# Patient Record
Sex: FEMALE | Race: WHITE | NOT HISPANIC OR LATINO | Employment: OTHER | URBAN - METROPOLITAN AREA
[De-identification: names, ages, dates, MRNs, and addresses within clinical notes are randomized per-mention and may not be internally consistent; named-entity substitution may affect disease eponyms.]

---

## 2017-06-08 ENCOUNTER — HOSPITAL ENCOUNTER (OUTPATIENT)
Dept: MAMMOGRAPHY | Facility: CLINIC | Age: 62
Discharge: HOME | End: 2017-06-08
Attending: OBSTETRICS & GYNECOLOGY | Admitting: OBSTETRICS & GYNECOLOGY
Payer: COMMERCIAL

## 2017-06-08 DIAGNOSIS — Z12.31 VISIT FOR SCREENING MAMMOGRAM: ICD-10-CM

## 2017-06-08 PROCEDURE — G0202 SCR MAMMO BI INCL CAD: HCPCS

## 2017-08-03 ENCOUNTER — OFFICE VISIT (OUTPATIENT)
Dept: FAMILY MEDICINE | Facility: CLINIC | Age: 62
End: 2017-08-03

## 2017-08-03 VITALS
SYSTOLIC BLOOD PRESSURE: 92 MMHG | OXYGEN SATURATION: 98 % | DIASTOLIC BLOOD PRESSURE: 68 MMHG | BODY MASS INDEX: 27.06 KG/M2 | HEART RATE: 90 BPM | WEIGHT: 144.4 LBS | TEMPERATURE: 98 F

## 2017-08-03 DIAGNOSIS — H61.23 BILATERAL IMPACTED CERUMEN: Primary | ICD-10-CM

## 2017-08-03 DIAGNOSIS — Z23 NEED FOR VACCINATION: ICD-10-CM

## 2017-08-03 PROCEDURE — 99213 OFFICE O/P EST LOW 20 MIN: CPT | Mod: 25 | Performed by: PHYSICIAN ASSISTANT

## 2017-08-03 PROCEDURE — 90471 IMMUNIZATION ADMIN: CPT | Performed by: PHYSICIAN ASSISTANT

## 2017-08-03 PROCEDURE — 90736 HZV VACCINE LIVE SUBQ: CPT | Performed by: PHYSICIAN ASSISTANT

## 2017-08-03 NOTE — PROGRESS NOTES
CC: Shingles shot, ear wash    History:  Mylene is here today to check her ears. She has a history of having wax build up in her ears, and was told she should have regular washes. She is not having any pain or ear symptoms, . Does feel like she has bad hearing. Has not tried any OTC treatments.    Mylene is also wanting her shingles shot today.    PMH, MEDICATIONS, ALLERGIES, SOCIAL AND FAMILY HISTORY in [facility] and reviewed by me personally.      ROS negative other than the symptoms noted above in the HPI.        Examination   BP 92/68 (BP Location: Right arm, Patient Position: Chair, Cuff Size: Adult Regular)  Pulse 90  Temp 98  F (36.7  C) (Oral)  Wt 65.5 kg (144 lb 6.4 oz)  LMP 10/08/2006  SpO2 98%  Breastfeeding? No  BMI 27.06 kg/m2       Constitutional: Sitting comfortably, in no acute distress. Vital signs noted  Eyes: pupils equal round reactive to light and accomodation, extra ocular movements intact  Ears: Ear canals bilaterally impacted with cerumen.  Mouth and throat: without erythema or lesions of the mucosa  Neck:  no adenopathy, trachea midline and normal to palpation  Cardiovascular:  regular rate and rhythm, no murmurs, clicks, or gallops  Respiratory:  normal respiratory rate and rhythm, lungs clear to auscultation  SKIN: No jaundice/pallor/rash.   Psychiatric: mentation appears normal and affect normal/bright        A/P    ICD-10-CM    1. Bilateral impacted cerumen H61.23 Removal Impacted Cerumen Irrigation Unilateral (Ear Wash)   2. Need for vaccination Z23 VACCINE ADMINISTRATION, INITIAL     ZOSTER VACC LIVE SUBQ NJX       DISCUSSION: Ear wash performed without difficulty. Gave Mylene handout on earwax management. Recommended Debrox drops every 1-2 weeks.    Shingles vaccination given.     follow up visit: As needed    Arely Ortiz PA-C  [facility]

## 2017-08-03 NOTE — MR AVS SNAPSHOT
"              After Visit Summary   8/3/2017    Mylene Vasquez    MRN: [id number]           Patient Information     Date Of Birth          1955        Visit Information        Provider Department      8/3/2017 10:30 AM Arely Ortiz PA-C [facility]        Today's Diagnoses     Bilateral impacted cerumen    -  1    Need for vaccination           Follow-ups after your visit        Follow-up notes from your care team     Return if symptoms worsen or fail to improve.      Who to contact     If you have questions or need follow up information about today's clinic visit or your schedule please contact [facility] directly at [phone].  Normal or non-critical lab and imaging results will be communicated to you by MyChart, letter or phone within 4 business days after the clinic has received the results. If you do not hear from us within 7 days, please contact the clinic through [employer] or phone. If you have a critical or abnormal lab result, we will notify you by phone as soon as possible.  Submit refill requests through [employer] or call your pharmacy and they will forward the refill request to us. Please allow 3 business days for your refill to be completed.          Additional Information About Your Visit        MyChart Information     [employer] lets you send messages to your doctor, view your test results, renew your prescriptions, schedule appointments and more. To sign up, go to [email or web address] . Click on \"Log in\" on the left side of the screen, which will take you to the Welcome page. Then click on \"Sign up Now\" on the right side of the page.     You will be asked to enter the access code listed below, as well as some personal information. Please follow the directions to create your username and password.     Your access code is: [email or web address]  Expires: 2017 11:03 PM     Your access code will  in 90 days. If you need help or a new code, " please call your [place] clinic or [phone].        Care EveryWhere ID     This is your Care EveryWhere ID. This could be used by other organizations to access your [place] medical records  [id number]        Your Vitals Were     Pulse Temperature Last Period Pulse Oximetry Breastfeeding? BMI (Body Mass Index)    90 98  F (36.7  C) (Oral) 10/08/2006 98% No 27.06 kg/m2       Blood Pressure from Last 3 Encounters:   08/03/17 92/68   04/12/16 120/80   11/04/15 118/70    Weight from Last 3 Encounters:   08/03/17 65.5 kg (144 lb 6.4 oz)   04/12/16 65.8 kg (145 lb)   11/04/15 66.4 kg (146 lb 6.4 oz)              We Performed the Following     Removal Impacted Cerumen Irrigation Unilateral (Ear Wash)     VACCINE ADMINISTRATION, INITIAL     ZOSTER VACC LIVE SUBQ NJX        Primary Care Provider Office Phone # Fax #    Venus Huerta [phone]       [facility] [address]   [facility] [address]        Equal Access to Services     [facility] CenterGABBY : Hadii aad ku hadasho Soomaali, waaxda luqadaha, qaybta kaalmada adeyenniyajitendra, mike sanders . So [facility] [phone].    ATENCIÓN: Si habla español, tiene a siddiqui disposición servicios gratuitos de asistencia lingüística. [facility] [phone].    We comply with applicable federal civil rights laws and [place] laws. We do not discriminate on the basis of race, color, national origin, age, disability sex, sexual orientation or gender identity.            Thank you!     Thank you for choosing [facility]  for your care. Our goal is always to provide you with excellent care. Hearing back from our patients is one way we can continue to improve our services. Please take a few minutes to complete the written survey that you may receive in the mail after your visit with us. Thank you!             Your Updated Medication List - Protect others around you: Learn how to safely use, store and  throw away your medicines at www.disposemymeds.org.          This list is accurate as of: 8/3/17 11:03 PM.  Always use your most recent med list.                   Brand Name Dispense Instructions for use Diagnosis    clomiPRAMINE 50 MG capsule    ANAFRANIL     Take 50 mg by mouth At Bedtime 5 caps daily        PARoxetine 30 MG tablet    PAXIL     Take by mouth every morning

## 2017-08-03 NOTE — NURSING NOTE
Mylene is here for ear check and shingles vaccine    Pre-Visit Screening :  Immunizations : Declines Tdap but wants shingle stoday  Colonoscopy : is up to date  Mammogram : is up to date  Asthma Action Test/Plan : SINDY  PHQ9/GAD7 :  SINDY  Pulse - regular  My Chart - declines    CLASSIFICATION OF OVERWEIGHT AND OBESITY BY BMI                         Obesity Class           BMI(kg/m2)  Underweight                                    < 18.5  Normal                                         18.5-24.9  Overweight                                     25.0-29.9  OBESITY                     I                  30.0-34.9                              II                 35.0-39.9  EXTREME OBESITY             III                >40                             Patient's  BMI Body mass index is 27.06 kg/(m^2).  http://hin.nhlbi.nih.gov/menuplanner/menu.cgi  Questioned patient about current smoking habits.  Pt. has never smoked.

## 2018-06-11 ENCOUNTER — HOSPITAL ENCOUNTER (OUTPATIENT)
Dept: MAMMOGRAPHY | Facility: CLINIC | Age: 63
Discharge: HOME | End: 2018-06-11
Attending: OBSTETRICS & GYNECOLOGY | Admitting: OBSTETRICS & GYNECOLOGY
Payer: COMMERCIAL

## 2018-06-11 DIAGNOSIS — Z12.31 VISIT FOR SCREENING MAMMOGRAM: ICD-10-CM

## 2018-06-11 PROCEDURE — 77063 BREAST TOMOSYNTHESIS BI: CPT

## 2018-11-26 ENCOUNTER — HOSPITAL ENCOUNTER (OUTPATIENT)
Facility: CLINIC | Age: 63
Discharge: HOME | End: 2018-11-26
Attending: COLON & RECTAL SURGERY | Admitting: COLON & RECTAL SURGERY
Payer: COMMERCIAL

## 2018-11-26 VITALS
OXYGEN SATURATION: 97 % | WEIGHT: 140 LBS | DIASTOLIC BLOOD PRESSURE: 84 MMHG | SYSTOLIC BLOOD PRESSURE: 131 MMHG | RESPIRATION RATE: 12 BRPM | BODY MASS INDEX: 25.76 KG/M2 | HEIGHT: 62 IN

## 2018-11-26 LAB — COLONOSCOPY: NORMAL

## 2018-11-26 PROCEDURE — 25000128 H RX IP 250 OP 636: Performed by: COLON & RECTAL SURGERY

## 2018-11-26 PROCEDURE — G0121 COLON CA SCRN NOT HI RSK IND: HCPCS | Performed by: COLON & RECTAL SURGERY

## 2018-11-26 PROCEDURE — 45378 DIAGNOSTIC COLONOSCOPY: CPT | Performed by: COLON & RECTAL SURGERY

## 2018-11-26 PROCEDURE — G0500 MOD SEDAT ENDO SERVICE >5YRS: HCPCS | Performed by: COLON & RECTAL SURGERY

## 2018-11-26 PROCEDURE — 99153 MOD SED SAME PHYS/QHP EA: CPT | Performed by: COLON & RECTAL SURGERY

## 2018-11-26 RX ORDER — NALOXONE HYDROCHLORIDE 0.4 MG/ML
.1-.4 INJECTION, SOLUTION INTRAMUSCULAR; INTRAVENOUS; SUBCUTANEOUS
Status: DISCONTINUED | OUTPATIENT
Start: 2018-11-26 | End: 2018-11-26 | Stop reason: HOSPADM

## 2018-11-26 RX ORDER — ONDANSETRON 2 MG/ML
4 INJECTION INTRAMUSCULAR; INTRAVENOUS EVERY 6 HOURS PRN
Status: DISCONTINUED | OUTPATIENT
Start: 2018-11-26 | End: 2018-11-26 | Stop reason: HOSPADM

## 2018-11-26 RX ORDER — ONDANSETRON 4 MG/1
4 TABLET, ORALLY DISINTEGRATING ORAL EVERY 6 HOURS PRN
Status: DISCONTINUED | OUTPATIENT
Start: 2018-11-26 | End: 2018-11-26 | Stop reason: HOSPADM

## 2018-11-26 RX ORDER — LIDOCAINE 40 MG/G
CREAM TOPICAL
Status: DISCONTINUED | OUTPATIENT
Start: 2018-11-26 | End: 2018-11-26 | Stop reason: HOSPADM

## 2018-11-26 RX ORDER — FENTANYL CITRATE 50 UG/ML
INJECTION, SOLUTION INTRAMUSCULAR; INTRAVENOUS PRN
Status: DISCONTINUED | OUTPATIENT
Start: 2018-11-26 | End: 2018-11-26 | Stop reason: HOSPADM

## 2018-11-26 RX ORDER — FLUMAZENIL 0.1 MG/ML
0.2 INJECTION, SOLUTION INTRAVENOUS
Status: DISCONTINUED | OUTPATIENT
Start: 2018-11-26 | End: 2018-11-26 | Stop reason: HOSPADM

## 2018-11-26 RX ORDER — ONDANSETRON 2 MG/ML
4 INJECTION INTRAMUSCULAR; INTRAVENOUS
Status: DISCONTINUED | OUTPATIENT
Start: 2018-11-26 | End: 2018-11-26 | Stop reason: HOSPADM

## 2018-11-26 NOTE — H&P
Pre-Endoscopy History and Physical     Mylene Vasquez MRN# [id number]   YOB: 1955 Age: 63 year old     Date of Procedure: 11/26/2018  Primary care provider: Venus Huerta  Type of Endoscopy: colonoscopy  Reason for Procedure: screening  Type of Anesthesia Anticipated: Moderate Sedation    HPI:    Mylene is a 63 year old female who will be undergoing the above procedure.      A history and physical has been performed. The patient's medications and allergies have been reviewed. The risks and benefits of the procedure and the sedation options and risks were discussed with the patient.  All questions were answered and informed consent was obtained.      She denies a personal or family history of anesthesia complications or bleeding disorders.     Allergies   Allergen Reactions     Penicillins      itchy        Prior to Admission Medications   Prescriptions Last Dose Informant Patient Reported? Taking?   PARoxetine (PAXIL) 30 MG tablet 11/25/2018  Yes Yes   Sig: Take by mouth every morning   clomiPRAMINE (ANAFRANIL) 50 MG capsule 11/25/2018  Yes Yes   Sig: Take 50 mg by mouth At Bedtime 5 caps daily      Facility-Administered Medications: None       Patient Active Problem List   Diagnosis     ACP (advance care planning)     Health Care Home     Generalized anxiety disorder     Callus of foot     DDD (degenerative disc disease), lumbar     Anxiety        Past Medical History:   Diagnosis Date     Anxiety      Depressive disorder         Past Surgical History:   Procedure Laterality Date     COLONOSCOPY       URETHROPLASTY  1968       Social History   Substance Use Topics     Smoking status: Never Smoker     Smokeless tobacco: Never Used     Alcohol use Yes      Comment: very little       Family History   Problem Relation Age of Onset     Alcohol/Drug Father      Cancer Father      lung cancer     Colon Cancer No family hx of        REVIEW OF SYSTEMS:     5 point ROS negative except as noted above in  "HPI, including Gen., Resp., CV, GI &  system review.      PHYSICAL EXAM:   Ht 1.575 m (5' 2\")  Wt 63.5 kg (140 lb)  LMP 03/26/2007  BMI 25.61 kg/m2 Estimated body mass index is 25.61 kg/(m^2) as calculated from the following:    Height as of this encounter: 1.575 m (5' 2\").    Weight as of this encounter: 63.5 kg (140 lb).   GENERAL APPEARANCE: healthy and alert  MENTAL STATUS: alert  AIRWAY EXAM: Mallampatti Class II (visualization of the soft palate, fauces, and uvula)  RESP: lungs clear to auscultation - no rales, rhonchi or wheezes  CV: regular rates and rhythm      DIAGNOSTICS:    Not indicated      IMPRESSION   ASA Class 2 - Mild systemic disease        PLAN:       Plan for colonoscopy. We discussed the risks, benefits and alternatives and the patient wished to proceed.    The above has been forwarded to the consulting provider.      Signed Electronically by: Ilsa Aguirre MD  November 26, 2018  "

## 2019-06-18 ENCOUNTER — HOSPITAL ENCOUNTER (OUTPATIENT)
Dept: MAMMOGRAPHY | Facility: CLINIC | Age: 64
Discharge: HOME | End: 2019-06-18
Attending: OBSTETRICS & GYNECOLOGY | Admitting: OBSTETRICS & GYNECOLOGY
Payer: COMMERCIAL

## 2019-06-18 DIAGNOSIS — Z12.31 VISIT FOR SCREENING MAMMOGRAM: ICD-10-CM

## 2019-06-18 PROCEDURE — 77063 BREAST TOMOSYNTHESIS BI: CPT

## 2019-10-23 ENCOUNTER — ANCILLARY PROCEDURE (OUTPATIENT)
Dept: GENERAL RADIOLOGY | Facility: CLINIC | Age: 64
End: 2019-10-23
Attending: FAMILY MEDICINE
Payer: COMMERCIAL

## 2019-10-23 ENCOUNTER — OFFICE VISIT (OUTPATIENT)
Dept: ORTHOPEDICS | Facility: CLINIC | Age: 64
End: 2019-10-23
Payer: COMMERCIAL

## 2019-10-23 VITALS
DIASTOLIC BLOOD PRESSURE: 82 MMHG | HEIGHT: 62 IN | WEIGHT: 147 LBS | BODY MASS INDEX: 27.05 KG/M2 | SYSTOLIC BLOOD PRESSURE: 118 MMHG

## 2019-10-23 DIAGNOSIS — M41.9 SCOLIOSIS OF LUMBAR SPINE, UNSPECIFIED SCOLIOSIS TYPE: ICD-10-CM

## 2019-10-23 DIAGNOSIS — M51.369 LUMBAR DEGENERATIVE DISC DISEASE: ICD-10-CM

## 2019-10-23 DIAGNOSIS — M47.816 FACET ARTHROPATHY, LUMBAR: ICD-10-CM

## 2019-10-23 DIAGNOSIS — M54.50 LOW BACK PAIN: ICD-10-CM

## 2019-10-23 DIAGNOSIS — M54.41 CHRONIC RIGHT-SIDED LOW BACK PAIN WITH RIGHT-SIDED SCIATICA: Primary | ICD-10-CM

## 2019-10-23 DIAGNOSIS — G89.29 CHRONIC RIGHT-SIDED LOW BACK PAIN WITH RIGHT-SIDED SCIATICA: Primary | ICD-10-CM

## 2019-10-23 DIAGNOSIS — M43.16 SPONDYLOLISTHESIS OF LUMBAR REGION: ICD-10-CM

## 2019-10-23 PROCEDURE — 72100 X-RAY EXAM L-S SPINE 2/3 VWS: CPT

## 2019-10-23 PROCEDURE — 99204 OFFICE O/P NEW MOD 45 MIN: CPT | Performed by: FAMILY MEDICINE

## 2019-10-23 ASSESSMENT — MIFFLIN-ST. JEOR: SCORE: 1175.04

## 2019-10-23 NOTE — PATIENT INSTRUCTIONS
1. Chronic right-sided low back pain with right-sided sciatica    2. Scoliosis of lumbar spine, unspecified scoliosis type    3. Facet arthropathy, lumbar    4. Lumbar degenerative disc disease    5. Spondylolisthesis of lumbar region (lumbar)      Reviewed your xray - age related changes, nothing surgical. Evidence of facet arthritis, degenerative disc disease (narrowing between the bones  You will need home exercise daily for the rest of your life to maintain your stability / strength of your back  If needed, can restart formal therapy with a spine based therapist. Recommend someone who is MDT certified.  If not improving can consider MRI (to evaluate for an injection) and / or referral to pain management  Would recommend seeing Neurology regarding the tremor that was noted today    Follow-up as needed.

## 2019-10-23 NOTE — LETTER
10/23/2019         RE: Mylene Vasquez  [address] [place] [address]        Dear Colleague,    Thank you for referring your patient, Mylene Vasquez, to the [facility]. Please see a copy of my visit note below.    ASSESSMENT & PLAN    1. Chronic right-sided low back pain with right-sided sciatica    2. Scoliosis of lumbar spine, unspecified scoliosis type    3. Facet arthropathy, lumbar    4. Lumbar degenerative disc disease    5. Spondylolisthesis of lumbar region (lumbar)      Seen for chronic back pain, atraumatic in nature.  Has been seen by [facility] in the past - xray and physical which she reports did not help  Reviewed xray - age related degenerative disc, scoliosis and facet arthritis. No power deficit on exam. All non-surgical findings.  Educated that will need home exercise daily for the rest of her life to maintain your stability / strength of your back  If needed, can restart formal therapy with a spine based therapist. Recommend someone who is MDT certified. Not currently interested in a referral.  If not improving can consider MRI (to evaluate for an injection) and / or referral to pain management  Additionally, noted to have slight hyperactive patellar reflex with tremor / twitch after access reflex.  Would recommend seeing Neurology. Will notify PCP of my findings.    Follow-up as needed.    -----    SUBJECTIVE  Mylene Vasquez is a/an 64 year old female who is seen as a self referral for evaluation of low back pain. The patient is seen by themselves.    Onset: 6-7 years(s) ago. Reports insidious onset without acute precipitating event.  Location of Pain: midline low back  Rating of Pain at worst: 5/10  Rating of Pain Currently: 1/10  Worsened by: prolonged standing, slow walking, bending  Better with: sitting, laying down  Treatments tried: rest/activity avoidance, ice, heat, ibuprofen (does not take it really), home exercises, physical therapy and chiropractic care.   "Does not really take any medications, finds more relief with sitting.  Quality: stiff, uncomfortable  Red flags: Weakness: Yes - left leg, bowel/bladder loss: No, foot drop: No  Associated symptoms: numbness and tingling radiating into left buttock and down left leg into left foot x 4 months only with sitting, occurs intermittently  Orthopedic history: YES - h/o chronic low back pain Date: 2014. Was seen by [facility] (Dr. Shafer) and also went to [facility].  No relief with therapy.  Finds that sitting helps.  Aquafit in the pool - has been helpful, [place] and then needs a break)  Relevant surgical history: NO  Patient Social History: retired    Patient's past medical, surgical, social, and family histories were reviewed today and no pertinent history related to patient's presenting problem.    REVIEW OF SYSTEMS:  10 point ROS is negative other than symptoms noted above in HPI, Past Medical History or as stated below  Constitutional: NEGATIVE for fever, chills, change in weight  Skin: NEGATIVE for worrisome rashes, moles or lesions  GI/: NEGATIVE for bowel or bladder changes  Neuro: NEGATIVE for weakness, dizziness or paresthesias    OBJECTIVE:  /82   Ht 1.575 m (5' 2\")   Wt 66.7 kg (147 lb)   LMP 03/26/2007   BMI 26.89 kg/m      General: healthy, alert and in no distress  HEENT: no scleral icterus or conjunctival erythema  Skin: no suspicious lesions or rash. No jaundice.  CV: no pedal edema  Resp: normal respiratory effort without conversational dyspnea   Psych: normal mood and affect  Gait: normal steady gait with appropriate coordination and balance  Neuro: normal light touch sensory exam of the bilateral lower extremities.  DTR's appears hyperactive on right patella with twitch / tremor after completion. Present on the left (patella) and bilateral and achilles  MSK:  THORACIC/LUMBAR SPINE  Inspection:    No gross deformity/asymmetry  Palpation:    Tender about the lumbar " facet joints, left SI joint, right SI joint and left sciatic notch. Otherwise remainder of landmarks are nontender.  Range of Motion:     Lumbar flexion limited by pain    Lumbar extension limited by pain  Strength:    quadriceps 5/5, hamstrings 5/5, gastrocsoleus 5/5, tibialis anterior 5/5, extensor hallicus longus 5/5  Special Tests:    Negative: slump test (bilateral). Noted to have reflex twitching in bilateral lower extremities (right > left) when raising legs to check slump    Independent visualization of the below image:  X-ray Lumbar Spine  Scoliosis.  Advanced degenerative disc loss at L5-S1.  Facet arthropathy with degenerative mild spondylolisthesis at L4-L5.  No fracture or acute osseous findings.    Gunnar Mayen DO [facility]      Again, thank you for allowing me to participate in the care of your patient.        Sincerely,        Gunnar Mayen DO

## 2019-10-23 NOTE — PROGRESS NOTES
ASSESSMENT & PLAN    1. Chronic right-sided low back pain with right-sided sciatica    2. Scoliosis of lumbar spine, unspecified scoliosis type    3. Facet arthropathy, lumbar    4. Lumbar degenerative disc disease    5. Spondylolisthesis of lumbar region (lumbar)      Seen for chronic back pain, atraumatic in nature.  Has been seen by [facility] in the past - xray and physical which she reports did not help  Reviewed xray - age related degenerative disc, scoliosis and facet arthritis. No power deficit on exam. All non-surgical findings.  Educated that will need home exercise daily for the rest of her life to maintain your stability / strength of your back  If needed, can restart formal therapy with a spine based therapist. Recommend someone who is MDT certified. Not currently interested in a referral.  If not improving can consider MRI (to evaluate for an injection) and / or referral to pain management  Additionally, noted to have slight hyperactive patellar reflex with tremor / twitch after access reflex.  Would recommend seeing Neurology. Will notify PCP of my findings.    Follow-up as needed.    -----    SUBJECTIVE  Mylene Vasquez is a/an 64 year old female who is seen as a self referral for evaluation of low back pain. The patient is seen by themselves.    Onset: 6-7 years(s) ago. Reports insidious onset without acute precipitating event.  Location of Pain: midline low back  Rating of Pain at worst: 5/10  Rating of Pain Currently: 1/10  Worsened by: prolonged standing, slow walking, bending  Better with: sitting, laying down  Treatments tried: rest/activity avoidance, ice, heat, ibuprofen (does not take it really), home exercises, physical therapy and chiropractic care.  Does not really take any medications, finds more relief with sitting.  Quality: stiff, uncomfortable  Red flags: Weakness: Yes - left leg, bowel/bladder loss: No, foot drop: No  Associated symptoms: numbness and tingling radiating into left buttock  "and down left leg into left foot x 4 months only with sitting, occurs intermittently  Orthopedic history: YES - h/o chronic low back pain Date: 2014. Was seen by [facility] (Dr. Shafer) and also went to [facility].  No relief with therapy.  Finds that sitting helps.  Aquafit in the pool - has been helpful, [place] and then needs a break)  Relevant surgical history: NO  Patient Social History: retired    Patient's past medical, surgical, social, and family histories were reviewed today and no pertinent history related to patient's presenting problem.    REVIEW OF SYSTEMS:  10 point ROS is negative other than symptoms noted above in HPI, Past Medical History or as stated below  Constitutional: NEGATIVE for fever, chills, change in weight  Skin: NEGATIVE for worrisome rashes, moles or lesions  GI/: NEGATIVE for bowel or bladder changes  Neuro: NEGATIVE for weakness, dizziness or paresthesias    OBJECTIVE:  /82   Ht 1.575 m (5' 2\")   Wt 66.7 kg (147 lb)   LMP 03/26/2007   BMI 26.89 kg/m     General: healthy, alert and in no distress  HEENT: no scleral icterus or conjunctival erythema  Skin: no suspicious lesions or rash. No jaundice.  CV: no pedal edema  Resp: normal respiratory effort without conversational dyspnea   Psych: normal mood and affect  Gait: normal steady gait with appropriate coordination and balance  Neuro: normal light touch sensory exam of the bilateral lower extremities.  DTR's appears hyperactive on right patella with twitch / tremor after completion. Present on the left (patella) and bilateral and achilles  MSK:  THORACIC/LUMBAR SPINE  Inspection:    No gross deformity/asymmetry  Palpation:    Tender about the lumbar facet joints, left SI joint, right SI joint and left sciatic notch. Otherwise remainder of landmarks are nontender.  Range of Motion:     Lumbar flexion limited by pain    Lumbar extension limited by pain  Strength:    quadriceps 5/5, hamstrings 5/5, " gastrocsoleus 5/5, tibialis anterior 5/5, extensor hallicus longus 5/5  Special Tests:    Negative: slump test (bilateral). Noted to have reflex twitching in bilateral lower extremities (right > left) when raising legs to check slump    Independent visualization of the below image:  X-ray Lumbar Spine  Scoliosis.  Advanced degenerative disc loss at L5-S1.  Facet arthropathy with degenerative mild spondylolisthesis at L4-L5.  No fracture or acute osseous findings.    Gunnar Mayen DO [facility]

## 2019-10-23 NOTE — Clinical Note
Mylene Reed Dr. self-referred to my clinic today for her chronic back pain. Hopefully I was able to help her understand reasons for her pain and my recommendations. Of note, she seemed to have a hyperactive lower extremity reflex with ? tremor/twitch but not to the degree that I would say she has hyperreflexia. Asked that she follow-up with you and may warrant Neurology referral.

## 2019-11-20 ENCOUNTER — OFFICE VISIT (OUTPATIENT)
Dept: FAMILY MEDICINE | Facility: CLINIC | Age: 64
End: 2019-11-20

## 2019-11-20 VITALS
HEART RATE: 87 BPM | OXYGEN SATURATION: 99 % | SYSTOLIC BLOOD PRESSURE: 110 MMHG | TEMPERATURE: 97.3 F | DIASTOLIC BLOOD PRESSURE: 78 MMHG | WEIGHT: 155 LBS | BODY MASS INDEX: 28.35 KG/M2

## 2019-11-20 DIAGNOSIS — F41.1 GAD (GENERALIZED ANXIETY DISORDER): Primary | ICD-10-CM

## 2019-11-20 DIAGNOSIS — Z79.899 MEDICATION MANAGEMENT: ICD-10-CM

## 2019-11-20 PROCEDURE — 93000 ELECTROCARDIOGRAM COMPLETE: CPT | Performed by: FAMILY MEDICINE

## 2019-11-20 PROCEDURE — 99212 OFFICE O/P EST SF 10 MIN: CPT | Performed by: FAMILY MEDICINE

## 2019-11-20 RX ORDER — CLOMIPRAMINE HYDROCHLORIDE 50 MG/1
CAPSULE ORAL EVERY EVENING
COMMUNITY
Start: 2019-11-20

## 2019-11-20 NOTE — NURSING NOTE
Chief Complaint   Patient presents with     Consult     Order for EKG from Psychiatrist      Pre-visit Screening:  Immunizations:  up to date  Colonoscopy:  is up to date  Mammogram: is up to date  Asthma Action Test/Plan:  NA  PHQ9:  NA  GAD7:  NA  Questioned patient about current smoking habits Pt. has never smoked.  Ok to leave detailed message on voice mail for today's visit only yes, phone # [phone]

## 2019-11-20 NOTE — PROGRESS NOTES
SUBJECTIVE:  64 year old female presents with the following concern:    Sees Dr Donovan once a year for chronic anxiety  Dr Donovan is requesting annual EKG for  Medication management    Patient Active Problem List   Diagnosis     ACP (advance care planning)     Health Care Home     Generalized anxiety disorder     Callus of foot     DDD (degenerative disc disease), lumbar     Anxiety     Current Outpatient Medications   Medication     clomiPRAMINE (ANAFRANIL) 50 MG capsule     PARoxetine (PAXIL) 30 MG tablet     No current facility-administered medications for this visit.      Mylene denies dizziness, palpitations, chest pain.  Anxiety symptoms are controlled  Normal EKG Results to be sent to:    Dr Deshawn Donovan  [address] [place] [address], [address]  [place], [place]   [phone]  [phone]

## 2019-12-20 ENCOUNTER — OFFICE VISIT (OUTPATIENT)
Dept: FAMILY MEDICINE | Facility: CLINIC | Age: 64
End: 2019-12-20

## 2019-12-20 VITALS
HEART RATE: 90 BPM | DIASTOLIC BLOOD PRESSURE: 68 MMHG | SYSTOLIC BLOOD PRESSURE: 110 MMHG | TEMPERATURE: 98.7 F | OXYGEN SATURATION: 98 %

## 2019-12-20 DIAGNOSIS — H61.23 BILATERAL IMPACTED CERUMEN: ICD-10-CM

## 2019-12-20 DIAGNOSIS — R05.3 CHRONIC COUGH: Primary | ICD-10-CM

## 2019-12-20 DIAGNOSIS — K21.9 GASTROESOPHAGEAL REFLUX DISEASE, ESOPHAGITIS PRESENCE NOT SPECIFIED: ICD-10-CM

## 2019-12-20 PROCEDURE — 99214 OFFICE O/P EST MOD 30 MIN: CPT | Performed by: FAMILY MEDICINE

## 2019-12-20 PROCEDURE — 69209 REMOVE IMPACTED EAR WAX UNI: CPT | Mod: 50 | Performed by: FAMILY MEDICINE

## 2019-12-20 RX ORDER — DOXYCYCLINE 100 MG/1
100 CAPSULE ORAL
COMMUNITY
Start: 2019-12-11 | End: 2019-12-21

## 2019-12-20 NOTE — NURSING NOTE
Chief Complaint   Patient presents with     Hospital F/U     Urgent Care- [facility] 12/4, 12/11, Bad cold/flu, cough

## 2019-12-20 NOTE — PROGRESS NOTES
SUBJECTIVE:  64 year old female presents for follow up after UC visit on 12/11/2019 for symptoms of cough  At that visit she complained of four weeks of cough following a head cold  No fever  No nasal drainage  Normal chest x-ray 12/11/2019    Treatment:  Prednisone  Tessalon  Doxycycline    History of asthma:no  History of allergies: no  History of recurrent sinusitis, sinus surgery:    Throat feels dry    Always has a dry mouth- on antidepressants    Patient Active Problem List   Diagnosis     ACP (advance care planning)     Health Care Home     Generalized anxiety disorder     Callus of foot     DDD (degenerative disc disease), lumbar     Anxiety     Past Surgical History:   Procedure Laterality Date     COLONOSCOPY       COLONOSCOPY N/A 11/26/2018    Procedure: COLONOSCOPY ;  Surgeon: Ilsa Aguirre MD;  Location:  GI     URETHROPLASTY  1967     Current Outpatient Medications   Medication     clomiPRAMINE (ANAFRANIL) 50 MG capsule     omeprazole (PRILOSEC) 20 MG DR capsule     PARoxetine (PAXIL) 30 MG tablet     gabapentin (NEURONTIN) 300 MG capsule     No current facility-administered medications for this visit.       ROS: 10 point ROS neg other than the symptoms noted above in the HPI.  She denies heartburn  Ear reels plugged    OBJECTIVE:  /68 (BP Location: Left arm, Patient Position: Sitting, Cuff Size: Adult Regular)   Pulse 90   Temp 98.7  F (37.1  C) (Oral)   LMP 03/26/2007   SpO2 98%   Unilateral cerumen impaction-.resolution with ear irrigation. TM's normal bilaterally. Nose normal without lesions or discharge. Oropharynx normal. Neck supple without palpable adenopathy.  Chest is clear    Assessment    (R05) Chronic cough  (primary encounter diagnosis)  Comment 30 day trial and monitor symptoms  Plan: omeprazole (PRILOSEC) 20 MG DR capsule            (H61.23) Bilateral impacted cerumen  Comment:    Plan: Removal Impacted Cerumen Irrigation Unilateral         (Ear Wash)         (K21.9)  Gastroesophageal reflux disease, esophagitis presence not specified  Comment:  30 day trial (irritated throat, chronic cough)  Plan: omeprazole (PRILOSEC) 20 MG DR capsule

## 2019-12-23 ENCOUNTER — TELEPHONE (OUTPATIENT)
Dept: ORTHOPEDICS | Facility: CLINIC | Age: 64
End: 2019-12-23

## 2019-12-23 DIAGNOSIS — M47.816 FACET ARTHROPATHY, LUMBAR: ICD-10-CM

## 2019-12-23 DIAGNOSIS — M43.16 SPONDYLOLISTHESIS, LUMBAR REGION: ICD-10-CM

## 2019-12-23 DIAGNOSIS — M41.9 SCOLIOSIS OF LUMBAR SPINE, UNSPECIFIED SCOLIOSIS TYPE: ICD-10-CM

## 2019-12-23 DIAGNOSIS — M51.369 LUMBAR DEGENERATIVE DISC DISEASE: ICD-10-CM

## 2019-12-23 DIAGNOSIS — M54.41 CHRONIC RIGHT-SIDED LOW BACK PAIN WITH RIGHT-SIDED SCIATICA: Primary | ICD-10-CM

## 2019-12-23 DIAGNOSIS — G89.29 CHRONIC RIGHT-SIDED LOW BACK PAIN WITH RIGHT-SIDED SCIATICA: Primary | ICD-10-CM

## 2019-12-23 RX ORDER — GABAPENTIN 300 MG/1
CAPSULE ORAL
Qty: 90 CAPSULE | Refills: 3 | Status: SHIPPED | OUTPATIENT
Start: 2019-12-23 | End: 2020-12-08

## 2019-12-23 NOTE — TELEPHONE ENCOUNTER
Return call to patient to inform her of Dr. Mayen's recommendations.      Informed patient that Dr. Mayen signed PT orders and they were faxed to [facility].      Also informed patient that Dr. Mayen agreed with the recommendation of using NSAIDs and scheduled Tylenol.  Informed patient that she should be using the lowest effective dose of the NSAID and can take ibuprofen or Aleve but she did not take both.  Informed her that she may take 1000 mg of Tylenol 3 times a day and should not exceed 3000 mg of Tylenol a day.  Informed her to take medication with food.  Also informed patient that Dr. Mayen did send a prescription for gabapentin with taper to [facility].  Patient states that she will probably try NSAIDs and Tylenol first to see if she gets any relief.  Informed patient to call us with any questions or concerns.  Patient verbalized understanding and was appreciative of call back.    Mary Alice Virk, ATC

## 2019-12-23 NOTE — TELEPHONE ENCOUNTER
1. External PT order signed.  2. Agree with NSAID's (lowest effective dose) and scheduled Tylenol.  3. If desired can refer to pain management for additional work-up (MRI as appropriate) and next steps.   4. Rx for Gabapentin with taper sent to [facility] in chart.    Routing to office to help coordinate.    Gunnar Mayen DO, AXELM  [facility]

## 2019-12-23 NOTE — TELEPHONE ENCOUNTER
"Mylene Vasquez is a 64 year old female who left voicemail stating she saw Dr. Mayen back in October for back issue. It was recommended she go to physical therapy, but she never completed it. She states she is in \"so much pain\" now that she would like an order for physical therapy. She would like to get in after [place]. She was told she needs an order by a physical therapist. She would like it faxed but does not state where it is located. She would like a call back to discuss further.     Phone call to patient, no new injury. Has done physical therapy in the past but was not that confident in them. She would like to try it now. She would like an order sent to [facility] fax: [phone].     She states pain is located in same area of whole low back that radiates down right buttock to foot. Has numbness and tingling in buttock to foot at times. Denies new loss of bowel/bladder control or foot drop. She has an appointment scheduled for 1/3/20. Recommended she check with insurance regarding coverage. She also asked about what to take for pain. Discussed she may take ibuprofen or Aleve but not both. Discussed maximum daily dosages. Also discussed using heating pad or icy hot or patches she could try.   Will discuss with provider and get back with her per her request when order has been sent.     Patient expecting call back: YES      Preferred contact number:  [phone] (home) or on 's cell: [phone]  Can we leave a detailed message on this number: YES     Please see order pending and advise due to time frame passed and pain worsening.     SAVANAH Edwards RN            "

## 2020-01-06 ENCOUNTER — TRANSFERRED RECORDS (OUTPATIENT)
Dept: HEALTH INFORMATION MANAGEMENT | Facility: CLINIC | Age: 65
End: 2020-01-06

## 2020-02-04 RX ORDER — GABAPENTIN 300 MG/1
300 CAPSULE ORAL 3 TIMES DAILY
Qty: 90 CAPSULE | Refills: 3 | Status: SHIPPED | OUTPATIENT
Start: 2020-02-04 | End: 2020-12-08

## 2020-02-04 NOTE — TELEPHONE ENCOUNTER
Patient LVM that she called last week and stated she [facility]. Whomever she talked to said they would fax that over. Patient called to see if we had in fact faxed that over.       Arabella Hoyt ATC

## 2020-02-05 NOTE — TELEPHONE ENCOUNTER
Medication Request for: Gabapentin 300mg            Patient currently takin tab daily. Patient reports that she occasionally takes 1 tab BID when her back is more sore.  Patient has 90 capsules of medication remaining.  Patient is also taking OTC: none    Problems/ Concerns of Patient: constipation - patient unsure if related to gabapentin  Prescription last written on 19 by Dr. Mayen  Sig: Take one tab at night for 3 days.  If able to tolerate, take one tab twice daily for 3 days, then take one tab three times daily.   Last Fill Quantity: 90 with # refills: 3     Last Office Visit by provider: 10/23/19  Future Office Visit:   None scheduled  Patient desires to have faxed or E-prescribe to pharmacy if available  Pharmacy selected in [facility].    Phone number patient can be reached at: Home number on file [phone] (home)    Can we leave a detailed message on this number? YES    Huddled with provider regarding patient's concern for constipation.  Writer states that constipation is usually not a side effect of the gabapentin.  Will call patient once new Rx has been signed.    Mary Alice Virk, ATC

## 2020-02-12 NOTE — TELEPHONE ENCOUNTER
Called and checked that patient received medication. She did and was wondering if Dr. Mayen was a back specialist. I explained she wasn't but does see acute conditions. Anything longer that 4 months we would refer out. Noted understanding. She will be gone for about 3 weeks on vacation. Asked if she should come back for a follow up if she isn't doing better after that. I did recommend a f/u if not getting better.   No other questions were asked.    Arabella Hoyt ATC

## 2020-02-27 ENCOUNTER — TELEPHONE (OUTPATIENT)
Dept: ORTHOPEDICS | Facility: CLINIC | Age: 65
End: 2020-02-27

## 2020-02-27 NOTE — TELEPHONE ENCOUNTER
Received a message from central scheduling staff stating that Zoë from [facility] therapy department called with questions regarding plan of care forms that were faxed back to her on 2/24/2020.  Call back at [phone].    Return call to Zoë.  She states that patient has been seen for 6 visits of physical therapy.  She states she had faxed the plan of care forms for signature back in January.  Forms were faxed to her on 2/24/2020.  She states they were not signed and there was a note written on them regarding MDT PT so she wanted further clarification.     Upon chart review, no telephone encounter started regarding forms.  Requested Zoë fax back forms for further review and to better answer her questions.  She states she will also include blank copy of plan of care.  She asks if there are any questions to call her back directly at [phone].    Please watch for fax.    Mary Alice Virk, ATC

## 2020-03-06 NOTE — TELEPHONE ENCOUNTER
Late entry: Received fax on 2/28/20 with blank copy of plan of care.    Huddled with provider to discuss situation. Plan of care signed today (3/6/20) and faxed back to Zoë [facility] PT Dept. @ [phone].    Mary Alice Virk ATC

## 2020-03-31 ENCOUNTER — APPOINTMENT (OUTPATIENT)
Age: 65
Setting detail: DERMATOLOGY
End: 2020-04-03

## 2020-03-31 VITALS — WEIGHT: 140 LBS | RESPIRATION RATE: 14 BRPM | HEIGHT: 62 IN

## 2020-03-31 DIAGNOSIS — D22 MELANOCYTIC NEVI: ICD-10-CM

## 2020-03-31 DIAGNOSIS — Z71.89 OTHER SPECIFIED COUNSELING: ICD-10-CM

## 2020-03-31 DIAGNOSIS — D18.0 HEMANGIOMA: ICD-10-CM

## 2020-03-31 DIAGNOSIS — L81.4 OTHER MELANIN HYPERPIGMENTATION: ICD-10-CM

## 2020-03-31 DIAGNOSIS — L82.1 OTHER SEBORRHEIC KERATOSIS: ICD-10-CM

## 2020-03-31 PROBLEM — D22.5 MELANOCYTIC NEVI OF TRUNK: Status: ACTIVE | Noted: 2020-03-31

## 2020-03-31 PROBLEM — D18.01 HEMANGIOMA OF SKIN AND SUBCUTANEOUS TISSUE: Status: ACTIVE | Noted: 2020-03-31

## 2020-03-31 PROCEDURE — 99214 OFFICE O/P EST MOD 30 MIN: CPT

## 2020-03-31 PROCEDURE — OTHER COUNSELING: OTHER

## 2020-03-31 ASSESSMENT — LOCATION DETAILED DESCRIPTION DERM
LOCATION DETAILED: SUPERIOR THORACIC SPINE
LOCATION DETAILED: INFERIOR THORACIC SPINE
LOCATION DETAILED: RIGHT MEDIAL UPPER BACK
LOCATION DETAILED: RIGHT SUPERIOR MEDIAL UPPER BACK
LOCATION DETAILED: RIGHT INFERIOR MEDIAL UPPER BACK

## 2020-03-31 ASSESSMENT — LOCATION ZONE DERM: LOCATION ZONE: TRUNK

## 2020-03-31 ASSESSMENT — LOCATION SIMPLE DESCRIPTION DERM
LOCATION SIMPLE: RIGHT UPPER BACK
LOCATION SIMPLE: UPPER BACK

## 2020-08-05 ENCOUNTER — HOSPITAL ENCOUNTER (OUTPATIENT)
Dept: MAMMOGRAPHY | Facility: CLINIC | Age: 65
Discharge: HOME | End: 2020-08-05
Attending: OBSTETRICS & GYNECOLOGY | Admitting: OBSTETRICS & GYNECOLOGY
Payer: COMMERCIAL

## 2020-08-05 DIAGNOSIS — Z12.31 VISIT FOR SCREENING MAMMOGRAM: ICD-10-CM

## 2020-08-05 PROCEDURE — 77067 SCR MAMMO BI INCL CAD: CPT

## 2020-10-15 ENCOUNTER — MEDICAL CORRESPONDENCE (OUTPATIENT)
Dept: HEALTH INFORMATION MANAGEMENT | Facility: CLINIC | Age: 65
End: 2020-10-15

## 2020-11-25 ENCOUNTER — OFFICE VISIT (OUTPATIENT)
Dept: INTERNAL MEDICINE | Facility: CLINIC | Age: 65
End: 2020-11-25
Payer: COMMERCIAL

## 2020-11-25 VITALS
OXYGEN SATURATION: 98 % | TEMPERATURE: 97.7 F | HEART RATE: 102 BPM | SYSTOLIC BLOOD PRESSURE: 118 MMHG | HEIGHT: 62 IN | BODY MASS INDEX: 27.42 KG/M2 | DIASTOLIC BLOOD PRESSURE: 62 MMHG | WEIGHT: 149 LBS | RESPIRATION RATE: 14 BRPM

## 2020-11-25 DIAGNOSIS — Z91.89 AT RISK FOR SIDE EFFECT OF MEDICATION: ICD-10-CM

## 2020-11-25 DIAGNOSIS — F41.1 GENERALIZED ANXIETY DISORDER: Primary | ICD-10-CM

## 2020-11-25 PROCEDURE — 93000 ELECTROCARDIOGRAM COMPLETE: CPT | Performed by: INTERNAL MEDICINE

## 2020-11-25 ASSESSMENT — MIFFLIN-ST. JEOR: SCORE: 1171.17

## 2020-11-25 NOTE — PROGRESS NOTES
Patient needed EKG order to follow-up with psychiatrist.  Office visit was canceled.    Akin Vasquez MD

## 2020-12-01 ENCOUNTER — TELEPHONE (OUTPATIENT)
Dept: ORTHOPEDICS | Facility: CLINIC | Age: 65
End: 2020-12-01

## 2020-12-01 NOTE — TELEPHONE ENCOUNTER
Message from Samantha. Asks if she can get a refill on the Gabapentin she was prescribed last year, or if she needs an appointment for review.  Please call back.

## 2020-12-02 NOTE — TELEPHONE ENCOUNTER
Return call to patient. LVM requesting call back to discuss further. Did not leave detailed VM.    Patient has not been seen in office since October 2019. Gabapentin was last filled by Dr. Mayen on 2/4/20 - 90 tabs with 3 refills. Sig: Take 1 capsule (300 mg) by mouth 3 times daily.    Huddled with Dr. Beyer. It would be recommend patient schedule f/u appointment with Dr. Mayen or one of her colleagues for reevaluation prior to refilling medication.    Mary Alice Virk MBA, ATC

## 2020-12-03 NOTE — TELEPHONE ENCOUNTER
Message from Samantha, retuning the call. Says should be by her phone for the next 2 hours.  Please call back.

## 2020-12-03 NOTE — TELEPHONE ENCOUNTER
Message from Mylene. Says she will have her cell phone on her now so she can be ready for a call back.  Says also ok to leave voicemail.  Please call back.

## 2020-12-04 NOTE — TELEPHONE ENCOUNTER
Called and relayed message to patient, scheduled her to see Dr. Mayen on Tuesday for a follow up    Arabella Hoyt ATC

## 2020-12-08 ENCOUNTER — OFFICE VISIT (OUTPATIENT)
Dept: ORTHOPEDICS | Facility: CLINIC | Age: 65
End: 2020-12-08
Payer: COMMERCIAL

## 2020-12-08 VITALS
HEIGHT: 62 IN | BODY MASS INDEX: 27.42 KG/M2 | SYSTOLIC BLOOD PRESSURE: 100 MMHG | DIASTOLIC BLOOD PRESSURE: 78 MMHG | WEIGHT: 149 LBS

## 2020-12-08 DIAGNOSIS — M41.9 SCOLIOSIS OF LUMBAR SPINE, UNSPECIFIED SCOLIOSIS TYPE: ICD-10-CM

## 2020-12-08 DIAGNOSIS — M54.41 CHRONIC RIGHT-SIDED LOW BACK PAIN WITH RIGHT-SIDED SCIATICA: Primary | ICD-10-CM

## 2020-12-08 DIAGNOSIS — G89.29 CHRONIC RIGHT-SIDED LOW BACK PAIN WITH RIGHT-SIDED SCIATICA: Primary | ICD-10-CM

## 2020-12-08 DIAGNOSIS — G89.29 CHRONIC RIGHT SACROILIAC JOINT PAIN: ICD-10-CM

## 2020-12-08 DIAGNOSIS — M43.16 SPONDYLOLISTHESIS, LUMBAR REGION: ICD-10-CM

## 2020-12-08 DIAGNOSIS — M51.369 LUMBAR DEGENERATIVE DISC DISEASE: ICD-10-CM

## 2020-12-08 DIAGNOSIS — M43.16 SPONDYLOLISTHESIS OF LUMBAR REGION: ICD-10-CM

## 2020-12-08 DIAGNOSIS — M47.816 FACET ARTHROPATHY, LUMBAR: ICD-10-CM

## 2020-12-08 DIAGNOSIS — M53.3 CHRONIC RIGHT SACROILIAC JOINT PAIN: ICD-10-CM

## 2020-12-08 PROCEDURE — 99214 OFFICE O/P EST MOD 30 MIN: CPT | Performed by: FAMILY MEDICINE

## 2020-12-08 RX ORDER — GABAPENTIN 300 MG/1
CAPSULE ORAL
Qty: 90 CAPSULE | Refills: 4 | Status: SHIPPED | OUTPATIENT
Start: 2020-12-08 | End: 2022-09-27

## 2020-12-08 ASSESSMENT — MIFFLIN-ST. JEOR: SCORE: 1171.17

## 2020-12-08 NOTE — PROGRESS NOTES
"ASSESSMENT & PLAN    1. Chronic right-sided low back pain with right-sided sciatica    2. Scoliosis of lumbar spine, unspecified scoliosis type    3. Facet arthropathy, lumbar    4. Lumbar degenerative disc disease    5. Spondylolisthesis of lumbar region (lumbar)    6. Chronic right sacroiliac joint pain      Physical therapy: [facility] - [phone]. Recommend an MDT therapist which our system has and scheduling will get you plugged in with one  Rx for Gabapentin and would take daily / at night  If pain is not improving would recommend an MRI    Follow-up if not improving    -----    SUBJECTIVE:  Mylene Vasquez is a 65 year old female who is seen in follow-up for chronic low back pain. They were last seen 10/23/2019.     Since their last visit reports worsening pain. Patient notes pain is located in bilateral lower back. She does note that the pain that radiated down the right leg has resolved. She notes pain in lower back is a constant, dull ache. Pain increases with walking slow or standing in one place. Patient notes soreness in lower back when she wakes up in the morning. Pain improves with sitting. They indicate that their current pain level is 0/10 and at worst is 8/10. They have tried rest/activity avoidance, other medications: Gabapentin 300mg PRN, home exercises, physical therapy at [facility] and previous imaging (xray 10/23/19).      The patient is seen by themselves.    Patient's past medical, surgical, social, and family histories were reviewed today and no pertinent history related to patient's presenting problem.    REVIEW OF SYSTEMS:  Constitutional: NEGATIVE for fever, chills, change in weight  Skin: NEGATIVE for worrisome rashes, moles or lesions  GI/: NEGATIVE for bowel or bladder changes  Neuro: NEGATIVE for weakness, dizziness or paresthesias    OBJECTIVE:  /78   Ht 1.562 m (5' 1.5\")   Wt 67.6 kg (149 lb)   LMP 03/26/2007   BMI 27.70 kg/m     General: healthy, alert " and in no distress  HEENT: no scleral icterus or conjunctival erythema  Skin: no suspicious lesions or rash. No jaundice.  CV: regular rhythm by palpation, no pedal edema  Resp: normal respiratory effort without conversational dyspnea   Psych: normal mood and affect  Gait: normal steady gait with appropriate coordination and balance  Neuro: normal light touch sensory exam of the extremities.    MSK:  THORACIC/LUMBAR SPINE  Palpation:    Tender about the lumbar facet joints and right SI joint. Otherwise remainder of landmarks are nontender.  Range of Motion:     Lumbar flexion limited by pain    Lumbar extension limited by pain   Strength:    Full strength throughout hips/quads/hamstrings, no foot drop present  Special Tests:  Negative: slump test (bilateral)    Independent visualization of the below image:  LUMBAR SPINE TWO-THREE  VIEWS  10/23/2019 10:10 AM      HISTORY: Low back pain     COMPARISON: None.     FINDINGS: 5 lumbar-type vertebrae. There is curvature of the lumbar  spine convex towards the right. There is normal osseous alignment.  No  fractures are identified.  There is loss of disc space height at the  L5-S1 level. A vacuum disc phenomenon is present at this level.  Degenerative change in the facet joints of the lower lumbar spine.                                                                      IMPRESSION: Degenerative change at L5-S1.     MD Gunnar BARRON, DO [facility]

## 2020-12-08 NOTE — LETTER
12/8/2020         RE: Mylene Vasquez  [address] [place] [address]        Dear Colleague,    Thank you for referring your patient, Mylene Vasquez, to the [facility] [place]. Please see a copy of my visit note below.    ASSESSMENT & PLAN    1. Chronic right-sided low back pain with right-sided sciatica    2. Scoliosis of lumbar spine, unspecified scoliosis type    3. Facet arthropathy, lumbar    4. Lumbar degenerative disc disease    5. Spondylolisthesis of lumbar region (lumbar)    6. Chronic right sacroiliac joint pain      Physical therapy: [facility] - [phone]. Recommend an MDT therapist which our system has and scheduling will get you plugged in with one  Rx for Gabapentin and would take daily / at night  If pain is not improving would recommend an MRI    Follow-up if not improving    -----    SUBJECTIVE:  Mylene Vasquez is a 65 year old female who is seen in follow-up for chronic low back pain. They were last seen 10/23/2019.     Since their last visit reports worsening pain. Patient notes pain is located in bilateral lower back. She does note that the pain that radiated down the right leg has resolved. She notes pain in lower back is a constant, dull ache. Pain increases with walking slow or standing in one place. Patient notes soreness in lower back when she wakes up in the morning. Pain improves with sitting. They indicate that their current pain level is 0/10 and at worst is 8/10. They have tried rest/activity avoidance, other medications: Gabapentin 300mg PRN, home exercises, physical therapy at [facility] and previous imaging (xray 10/23/19).      The patient is seen by themselves.    Patient's past medical, surgical, social, and family histories were reviewed today and no pertinent history related to patient's presenting problem.    REVIEW OF SYSTEMS:  Constitutional: NEGATIVE for fever, chills, change in weight  Skin: NEGATIVE for  "worrisome rashes, moles or lesions  GI/: NEGATIVE for bowel or bladder changes  Neuro: NEGATIVE for weakness, dizziness or paresthesias    OBJECTIVE:  /78   Ht 1.562 m (5' 1.5\")   Wt 67.6 kg (149 lb)   LMP 03/26/2007   BMI 27.70 kg/m     General: healthy, alert and in no distress  HEENT: no scleral icterus or conjunctival erythema  Skin: no suspicious lesions or rash. No jaundice.  CV: regular rhythm by palpation, no pedal edema  Resp: normal respiratory effort without conversational dyspnea   Psych: normal mood and affect  Gait: normal steady gait with appropriate coordination and balance  Neuro: normal light touch sensory exam of the extremities.    MSK:  THORACIC/LUMBAR SPINE  Palpation:    Tender about the lumbar facet joints and right SI joint. Otherwise remainder of landmarks are nontender.  Range of Motion:     Lumbar flexion limited by pain    Lumbar extension limited by pain   Strength:    Full strength throughout hips/quads/hamstrings, no foot drop present  Special Tests:  Negative: slump test (bilateral)    Independent visualization of the below image:  LUMBAR SPINE TWO-THREE  VIEWS  10/23/2019 10:10 AM      HISTORY: Low back pain     COMPARISON: None.     FINDINGS: 5 lumbar-type vertebrae. There is curvature of the lumbar  spine convex towards the right. There is normal osseous alignment.  No  fractures are identified.  There is loss of disc space height at the  L5-S1 level. A vacuum disc phenomenon is present at this level.  Degenerative change in the facet joints of the lower lumbar spine.                                                                      IMPRESSION: Degenerative change at L5-S1.     MD Gunnar BARRON DO [facility]            Again, thank you for allowing me to participate in the care of your patient.        Sincerely,        Gunnar Mayen, DO    "

## 2020-12-08 NOTE — PATIENT INSTRUCTIONS
1. Chronic right-sided low back pain with right-sided sciatica    2. Scoliosis of lumbar spine, unspecified scoliosis type    3. Facet arthropathy, lumbar    4. Lumbar degenerative disc disease    5. Spondylolisthesis of lumbar region (lumbar)    6. Chronic right sacroiliac joint pain      Physical therapy: [facility] - [phone]. Recommend an MDT therapist which our system has and scheduling will get you plugged in with one  Rx for Gabapentin and would take daily / at night  If pain is not improving would recommend an MRI    Follow-up if not improving

## 2020-12-15 ENCOUNTER — THERAPY VISIT (OUTPATIENT)
Dept: PHYSICAL THERAPY | Facility: CLINIC | Age: 65
End: 2020-12-15
Attending: FAMILY MEDICINE
Payer: COMMERCIAL

## 2020-12-15 DIAGNOSIS — M47.816 FACET ARTHROPATHY, LUMBAR: ICD-10-CM

## 2020-12-15 DIAGNOSIS — M41.9 SCOLIOSIS OF LUMBAR SPINE, UNSPECIFIED SCOLIOSIS TYPE: ICD-10-CM

## 2020-12-15 DIAGNOSIS — M43.16 SPONDYLOLISTHESIS OF LUMBAR REGION: ICD-10-CM

## 2020-12-15 DIAGNOSIS — G89.29 CHRONIC RIGHT-SIDED LOW BACK PAIN WITH RIGHT-SIDED SCIATICA: ICD-10-CM

## 2020-12-15 DIAGNOSIS — G89.29 CHRONIC MIDLINE LOW BACK PAIN WITHOUT SCIATICA: ICD-10-CM

## 2020-12-15 DIAGNOSIS — M51.369 LUMBAR DEGENERATIVE DISC DISEASE: ICD-10-CM

## 2020-12-15 DIAGNOSIS — M54.50 CHRONIC MIDLINE LOW BACK PAIN WITHOUT SCIATICA: ICD-10-CM

## 2020-12-15 DIAGNOSIS — M54.41 CHRONIC RIGHT-SIDED LOW BACK PAIN WITH RIGHT-SIDED SCIATICA: ICD-10-CM

## 2020-12-15 PROCEDURE — 97162 PT EVAL MOD COMPLEX 30 MIN: CPT | Mod: GP | Performed by: PHYSICAL THERAPIST

## 2020-12-15 PROCEDURE — 97110 THERAPEUTIC EXERCISES: CPT | Mod: GP | Performed by: PHYSICAL THERAPIST

## 2020-12-15 NOTE — PROGRESS NOTES
[facility] Initial Evaluation -- Lumbar    Date: December 15, 2020  Mylene Vasquez is a 65 year old female with a lumbar condition.   Referral: Dr. Mayen  Work mechanical stresses:  retired  Employment status:  retired  Leisure mechanical stresses: walking an hour per day  Functional disability score (STACEY/STarT Back):  See flow sheet  VAS score (0-10): 5/10  Patient goals/expectations:  I just don't have any and just accepted this pain; what I am going to do in February 2021 to get an MRI and maybe an injection.    HISTORY:    Present symptoms: central low back  Pain quality (sharp/shooting/stabbing/aching/burning/cramping):  aching   Paresthesia (yes/no):  occasionally in low back    Present since (onset date): November 2019 gotten sick with severe coughing.     Symptoms (improving/unchanging/worsening):  worsening.     Symptoms commenced as a result of: no apparent reason   Condition occurred in the following environment:        Symptoms at onset (back/thigh/leg): back, L buttocks  Constant symptoms (back/thigh/leg): back  Intermittent symptoms (back/thigh/leg):     Symptoms are made worse with the following: Always Standing   Symptoms are made better with the following: Always Sitting and Always When still    Disturbed sleep (yes/no):  no Sleeping postures (prone/sup/side R/L): sides/back    Previous episodes (0/1-5/6-10/11+): chronic Year of first episode:     Previous history: chronic, progressing low back pain  Previous treatments: PT, back support      Specific Questions:  Cough/Sneeze/Strain (pos/neg): neg  Bowel/Bladder (normal/abnormal): normal  Gait (normal/abnormal): nornal  Medications (nil/NSAIDS/analg/steroids/anticoag/other):  Other - Anti-depressants  Medical allergies:  penicillin  General health (excellent/good/fair/poor):  good  Pertinent medical history:  None  Imaging (None/Xray/MRI/Other):  xrays  Recent or major surgery (yes/no):  no  Night pain (yes/no): no  Accidents  (yes/no): none  Unexplained weight loss (yes/no): none  Barriers at home: none  Other red flags: none    EXAMINATION    Posture:   Sitting (good/fair/poor): fair  Standing (good/fair/poor):fair  Lordosis (red/acc/normal): acc  Correction of posture (better/worse/no effect): no effect    Lateral Shift (right/left/nil): nil  Relevant (yes/no):  no  Other Observations: none    Neurological:    Motor deficit:  n/a  Reflexes:  n/a  Sensory deficit:  n/a  Dural signs:  n/a    Movement Loss:   Ramesh Mod Min Nil Pain   Flexion  x   Poor curve reversal   Extension   x  erp   Side Gliding R    x    Side Gliding L    x      Test Movements:   During: produces, abolishes, increases, decreases, no effect, centralizing, peripheralizing   After: better, worse, no better, no worse, no effect, centralized, peripheralized    Pretest symptoms standing:    Symptoms During Symptoms After ROM increased ROM decreased No Effect   FIS        Rep FIS        EIS        Rep EIS          Pretest symptoms lying: central low back    Symptoms During Symptoms After ROM increased ROM decreased No Effect   EVA + op Decreases    Better         Rep EVA + op Decreases    Better    x     EIL        Rep EIL          If required, pretest symptoms:    Symptoms During Symptoms After ROM increased ROM decreased No Effect   SGIS - R        Rep SGIS - R        SGIS - L        Rep SGIS - L          Static Tests:  Sitting slouched:     Sitting erect:    Standing slouched:   Standing erect:    Lying prone in extension:   Long sitting:      Other Tests:     Provisional Classification:  Derangement - Bilateral, symmetrical, symptoms above knee    Principle of Management:  Education:  Traffic light, therapeutic dose of exercise     Equipment provided:    Mechanical therapy (Y/N):  Y   Extension principle:    Lateral Principle:    Flexion principle:  Rep EVA + pt. Op x 10/2 hrs  Other:      ASSESSMENT/PLAN:    Patient is a 65 year old female with lumbar complaints.     Patient has the following significant findings with corresponding treatment plan.                Diagnosis 1:  Chronic low back pain  Pain -  manual therapy, self management, education, directional preference exercise and home program  Decreased ROM/flexibility - manual therapy and therapeutic exercise  Decreased strength - therapeutic exercise and therapeutic activities  Decreased function - therapeutic activities  Impaired posture - neuro re-education    Therapy Evaluation Codes:   1) History comprised of:   Personal factors that impact the plan of care:      Anxiety.    Comorbidity factors that impact the plan of care are:      None.     Medications impacting care: Anti-depressant.  2) Examination of Body Systems comprised of:   Body structures and functions that impact the plan of care:      Lumbar spine.   Activity limitations that impact the plan of care are:      Standing.  3) Clinical presentation characteristics are:   Evolving/Changing.  4) Decision-Making    Moderate complexity using standardized patient assessment instrument and/or measureable assessment of functional outcome.  Cumulative Therapy Evaluation is: Moderate complexity.    Previous and current functional limitations:  (See Goal Flow Sheet for this information)    Short term and Long term goals: (See Goal Flow Sheet for this information)     Communication ability:  Patient appears to be able to clearly communicate and understand verbal and written communication and follow directions correctly.  Treatment Explanation - The following has been discussed with the patient:   RX ordered/plan of care  Anticipated outcomes  Possible risks and side effects  This patient would benefit from PT intervention to resume normal activities.   Rehab potential is good.    Frequency:  1 X week, once daily  Duration:  for 4 weeks  Discharge Plan:  Independent in home treatment program.  Reach maximal therapeutic benefit.    Please refer to the daily flowsheet  for treatment today, total treatment time and time spent performing 1:1 timed codes.

## 2020-12-16 PROBLEM — G89.29 CHRONIC LOW BACK PAIN: Status: ACTIVE | Noted: 2020-12-16

## 2020-12-16 PROBLEM — M54.50 CHRONIC LOW BACK PAIN: Status: ACTIVE | Noted: 2020-12-16

## 2020-12-22 ENCOUNTER — THERAPY VISIT (OUTPATIENT)
Dept: PHYSICAL THERAPY | Facility: CLINIC | Age: 65
End: 2020-12-22
Payer: COMMERCIAL

## 2020-12-22 DIAGNOSIS — M54.50 CHRONIC MIDLINE LOW BACK PAIN WITHOUT SCIATICA: ICD-10-CM

## 2020-12-22 DIAGNOSIS — G89.29 CHRONIC MIDLINE LOW BACK PAIN WITHOUT SCIATICA: ICD-10-CM

## 2020-12-22 PROCEDURE — 97110 THERAPEUTIC EXERCISES: CPT | Mod: GP | Performed by: PHYSICAL THERAPIST

## 2020-12-22 PROCEDURE — 97112 NEUROMUSCULAR REEDUCATION: CPT | Mod: GP | Performed by: PHYSICAL THERAPIST

## 2021-01-11 ENCOUNTER — THERAPY VISIT (OUTPATIENT)
Dept: PHYSICAL THERAPY | Facility: CLINIC | Age: 66
End: 2021-01-11
Payer: COMMERCIAL

## 2021-01-11 DIAGNOSIS — G89.29 CHRONIC MIDLINE LOW BACK PAIN WITHOUT SCIATICA: ICD-10-CM

## 2021-01-11 DIAGNOSIS — M54.50 CHRONIC MIDLINE LOW BACK PAIN WITHOUT SCIATICA: ICD-10-CM

## 2021-01-11 PROCEDURE — 97112 NEUROMUSCULAR REEDUCATION: CPT | Mod: GP | Performed by: PHYSICAL THERAPIST

## 2021-01-11 PROCEDURE — 97110 THERAPEUTIC EXERCISES: CPT | Mod: GP | Performed by: PHYSICAL THERAPIST

## 2021-02-10 ENCOUNTER — OFFICE VISIT (OUTPATIENT)
Dept: INTERNAL MEDICINE | Facility: CLINIC | Age: 66
End: 2021-02-10
Payer: COMMERCIAL

## 2021-02-10 VITALS
BODY MASS INDEX: 27.79 KG/M2 | SYSTOLIC BLOOD PRESSURE: 121 MMHG | DIASTOLIC BLOOD PRESSURE: 84 MMHG | HEART RATE: 103 BPM | TEMPERATURE: 98 F | WEIGHT: 149.5 LBS | OXYGEN SATURATION: 96 %

## 2021-02-10 DIAGNOSIS — G44.209 TENSION HEADACHE: Primary | ICD-10-CM

## 2021-02-10 DIAGNOSIS — R63.5 WEIGHT GAIN: ICD-10-CM

## 2021-02-10 DIAGNOSIS — Z13.6 ENCOUNTER FOR SCREENING FOR CARDIOVASCULAR DISORDERS: ICD-10-CM

## 2021-02-10 DIAGNOSIS — F41.1 GENERALIZED ANXIETY DISORDER: ICD-10-CM

## 2021-02-10 PROCEDURE — 99203 OFFICE O/P NEW LOW 30 MIN: CPT | Performed by: NURSE PRACTITIONER

## 2021-02-10 SDOH — HEALTH STABILITY: MENTAL HEALTH: HOW OFTEN DO YOU HAVE A DRINK CONTAINING ALCOHOL?: MONTHLY OR LESS

## 2021-02-10 SDOH — HEALTH STABILITY: MENTAL HEALTH: HOW OFTEN DO YOU HAVE 6 OR MORE DRINKS ON ONE OCCASION?: NEVER

## 2021-02-10 SDOH — HEALTH STABILITY: MENTAL HEALTH: HOW MANY STANDARD DRINKS CONTAINING ALCOHOL DO YOU HAVE ON A TYPICAL DAY?: 1 OR 2

## 2021-02-10 NOTE — PROGRESS NOTES
"    Assessment & Plan     Tension headache  - ordered labs:  - Comprehensive metabolic panel; Future  - Lipid panel reflex to direct LDL Fasting; Future  - TSH with free T4 reflex; Future  - CBC with platelets differential  - suggested over the counter Aleve or Ibuprofen may help  - if symptoms worsen as discussed, call and will order CT head    Encounter for screening for cardiovascular disorders  - Lipid panel reflex to direct LDL Fasting; Future    Weight gain  - TSH with free T4 reflex; Future  - CBC with platelets differential    Generalized anxiety disorder  - followed by psychiatrist for Sertraline and Anafranil      [id number]}     BMI:   Estimated body mass index is 27.79 kg/m  as calculated from the following:    Height as of 12/8/20: 1.562 m (5' 1.5\").    Weight as of this encounter: 67.8 kg (149 lb 8 oz).       Patient Instructions   Ordered fasting labs for around March 30th; call the lab and schedule an appointment    Recommended trial of Ibuprofen or Aleve for headache'; if symptoms persist or worsen will order CT head      Return if symptoms worsen or fail to improve.    Sherri Mir CNP  [facility] MICHA Chakraborty is a 65 year old who presents for the following health issues  accompanied by herself:    HPI       She has pain in the back of her head for a week with no known injury.    New patient to the clinic and provider.  Past PCP [facility]. Reviewed PMH, SH, FH, medications, and labs. It appears she had an appt with Pedro in this clinic but was cancelled.    Today's vist with complaints of headache x 1 week. Intermittent throbbing.  Denies worst headache ever.  Points to spot on right side tip of scalp.  No visual changes or double vision.  No extremity weakness or falls. Going on vacation x 3 weeks next week and concerned.  Has not had labs for some time and goes to OB/GYN for physical and pap.    Per record, sees that patient is followed by " psychiatrist + going to PT for chronic low back pain to include scoliosis, right sided LBP with right sciatica, and chronic right sacroiliac joint paIn.    No fever or cough.  No shortness of breathe or chest pain.  No stomach or urination issues.  Chronic low back pain. Reports some weight gain over this past year.    Review of Systems   Constitutional, HEENT, cardiovascular, pulmonary, GI, , musculoskeletal, neuro, skin, endocrine and psych systems are negative, except as otherwise noted.      Objective    /84 (BP Location: Right arm, Patient Position: Sitting, Cuff Size: Adult Regular)   Pulse 103   Temp 98  F (36.7  C) (Oral)   Wt 67.8 kg (149 lb 8 oz)   LMP 03/26/2007   SpO2 96%   BMI 27.79 kg/m    Body mass index is 27.79 kg/m .     Physical Exam   GENERAL:  alert and no distress  EYES: Eyes grossly normal to inspection, PERRL and conjunctivae and sclerae normal  NECK: no adenopathy, no asymmetry, masses, or scars and thyroid normal to palpation  RESP: lungs clear to auscultation - no rales, rhonchi or wheezes  CV: regular rate and rhythm, normal S1 S2, no S3 or S4, no murmur, click or rub, no peripheral edema and peripheral pulses strong  MS: no gross musculoskeletal defects noted, no edema  SKIN: no suspicious lesions or rashes

## 2021-02-10 NOTE — PATIENT INSTRUCTIONS
Ordered fasting labs for around March 30th; call the lab and schedule an appointment    Recommended trial of Ibuprofen or Aleve for headache'; if symptoms persist or worsen will order CT head

## 2021-02-19 PROBLEM — G89.29 CHRONIC LOW BACK PAIN: Status: RESOLVED | Noted: 2020-12-16 | Resolved: 2021-02-19

## 2021-02-19 PROBLEM — M54.50 CHRONIC LOW BACK PAIN: Status: RESOLVED | Noted: 2020-12-16 | Resolved: 2021-02-19

## 2021-02-19 NOTE — PROGRESS NOTES
DISCHARGE REPORT    Progress reporting period is from 12-15-20 to 1-11-21.       SUBJECTIVE  Subjective changes noted by patient:  .  Subjective: returns to clinic stating she feels she is a little better--had discontinued using brace and performing home program regularly.    Current pain level is 3/10 Current Pain level: 3/10.     Previous pain level was  5/10 Initial Pain level: 5/10.   Changes in function:  Yes (See Goal flowsheet attached for changes in current functional level)  Adverse reaction to treatment or activity: None    OBJECTIVE  Changes noted in objective findings:  Patient has failed to return to therapy so current objective findings are unknown.  Objective: Lx ROM Flex=min to mod loss, improved curve reversal (still lacks good curve reversal but improved); TA activation is fair with decreased endurance.      ASSESSMENT/PLAN  Updated problem list and treatment plan: Diagnosis 1:  Chronic low back pain  Pain -  self management, education, directional preference exercise and home program  Decreased ROM/flexibility - manual therapy and therapeutic exercise  Decreased strength - therapeutic exercise and therapeutic activities  Decreased function - therapeutic activities  STG/LTGs have been met or progress has been made towards goals:  Yes (See Goal flow sheet completed today.)  Assessment of Progress: The patient has not returned to therapy. Current status is unknown.  Patient is meeting short term goals and is progressing towards long term goals.  Self Management Plans:  Patient is independent in a home treatment program.  Patient is independent in self management of symptoms.  I have re-evaluated this patient and find that the nature, scope, duration and intensity of the therapy is appropriate for the medical condition of the patient.  Mylene continues to require the following intervention to meet STG and LTG's:  PT intervention is no longer required to meet STG/LTG.    Recommendations:  This patient  is ready to be discharged from therapy and continue their home treatment program.    Please refer to the daily flowsheet for treatment today, total treatment time and time spent performing 1:1 timed codes.

## 2021-04-05 ENCOUNTER — APPOINTMENT (OUTPATIENT)
Dept: URBAN - METROPOLITAN AREA CLINIC 253 | Age: 66
Setting detail: DERMATOLOGY
End: 2021-04-08

## 2021-04-05 VITALS — HEIGHT: 62 IN | WEIGHT: 145 LBS | RESPIRATION RATE: 15 BRPM

## 2021-04-05 DIAGNOSIS — Z71.89 OTHER SPECIFIED COUNSELING: ICD-10-CM

## 2021-04-05 DIAGNOSIS — D18.0 HEMANGIOMA: ICD-10-CM

## 2021-04-05 DIAGNOSIS — L85.3 XEROSIS CUTIS: ICD-10-CM

## 2021-04-05 DIAGNOSIS — L81.5 LEUKODERMA, NOT ELSEWHERE CLASSIFIED: ICD-10-CM

## 2021-04-05 DIAGNOSIS — D22 MELANOCYTIC NEVI: ICD-10-CM

## 2021-04-05 DIAGNOSIS — L72.0 EPIDERMAL CYST: ICD-10-CM

## 2021-04-05 DIAGNOSIS — L81.4 OTHER MELANIN HYPERPIGMENTATION: ICD-10-CM

## 2021-04-05 DIAGNOSIS — D17 BENIGN LIPOMATOUS NEOPLASM: ICD-10-CM

## 2021-04-05 DIAGNOSIS — L11.1 TRANSIENT ACANTHOLYTIC DERMATOSIS [GROVER]: ICD-10-CM

## 2021-04-05 DIAGNOSIS — L82.1 OTHER SEBORRHEIC KERATOSIS: ICD-10-CM

## 2021-04-05 PROBLEM — L30.9 DERMATITIS, UNSPECIFIED: Status: ACTIVE | Noted: 2021-04-05

## 2021-04-05 PROBLEM — D22.5 MELANOCYTIC NEVI OF TRUNK: Status: ACTIVE | Noted: 2021-04-05

## 2021-04-05 PROBLEM — D18.01 HEMANGIOMA OF SKIN AND SUBCUTANEOUS TISSUE: Status: ACTIVE | Noted: 2021-04-05

## 2021-04-05 PROBLEM — D17.22 BENIGN LIPOMATOUS NEOPLASM OF SKIN AND SUBCUTANEOUS TISSUE OF LEFT ARM: Status: ACTIVE | Noted: 2021-04-05

## 2021-04-05 PROCEDURE — OTHER COSMETIC EXTRACTIONS: OTHER

## 2021-04-05 PROCEDURE — 99213 OFFICE O/P EST LOW 20 MIN: CPT

## 2021-04-05 PROCEDURE — OTHER ADDITIONAL NOTES: OTHER

## 2021-04-05 PROCEDURE — OTHER COUNSELING: OTHER

## 2021-04-05 ASSESSMENT — LOCATION DETAILED DESCRIPTION DERM
LOCATION DETAILED: UPPER STERNUM
LOCATION DETAILED: RIGHT SUPERIOR MEDIAL UPPER BACK
LOCATION DETAILED: RIGHT INFERIOR LID MARGIN
LOCATION DETAILED: INFERIOR THORACIC SPINE
LOCATION DETAILED: LEFT PROXIMAL PRETIBIAL REGION
LOCATION DETAILED: RIGHT PROXIMAL PRETIBIAL REGION
LOCATION DETAILED: RIGHT INFERIOR LID MARGIN
LOCATION DETAILED: RIGHT PROXIMAL DORSAL FOREARM
LOCATION DETAILED: LEFT DISTAL POSTERIOR UPPER ARM
LOCATION DETAILED: LEFT PROXIMAL DORSAL FOREARM

## 2021-04-05 ASSESSMENT — LOCATION SIMPLE DESCRIPTION DERM
LOCATION SIMPLE: LEFT POSTERIOR UPPER ARM
LOCATION SIMPLE: LEFT FOREARM
LOCATION SIMPLE: RIGHT PRETIBIAL REGION
LOCATION SIMPLE: RIGHT INFERIOR EYELID
LOCATION SIMPLE: CHEST
LOCATION SIMPLE: RIGHT FOREARM
LOCATION SIMPLE: RIGHT INFERIOR EYELID
LOCATION SIMPLE: UPPER BACK
LOCATION SIMPLE: LEFT PRETIBIAL REGION
LOCATION SIMPLE: RIGHT UPPER BACK

## 2021-04-05 ASSESSMENT — LOCATION ZONE DERM
LOCATION ZONE: EYELID
LOCATION ZONE: ARM
LOCATION ZONE: TRUNK
LOCATION ZONE: LEG
LOCATION ZONE: EYELID

## 2021-04-05 NOTE — HPI: CYST (MILIA)
How Severe Is It?: mild
Is This A New Presentation, Or A Follow-Up?: Cyst
Additional History: Here for cosmetic removal

## 2021-04-05 NOTE — PROCEDURE: ADDITIONAL NOTES
Additional Notes: Discussed 30 minute excision for removal if patient desires
Render Risk Assessment In Note?: no
Additional Notes: Patient states the rash starts as pimply bumps and it occurs about once a year. AR discussed she’s favoring folliculitis diagnosis
Detail Level: Detailed

## 2021-04-05 NOTE — PROCEDURE: COSMETIC EXTRACTIONS
Anesthesia Volume In Cc: 0
Price (Use Numbers Only, No Special Characters Or $): 50
Consent: The patient's consent was obtained including but not limited to risks of crusting, scabbing, blistering, scarring, darker or lighter pigmentary change, recurrence, incomplete removal and infection.
Render The Number Of Extractions: Yes
Detail Level: Detailed
Post-Care Instructions: I reviewed with the patient in detail post-care instructions. Patient is to wear sunprotection, and avoid picking at any of the treated lesions. Pt may apply Vaseline to crusted or scabbing areas.

## 2021-04-15 DIAGNOSIS — Z13.6 ENCOUNTER FOR SCREENING FOR CARDIOVASCULAR DISORDERS: ICD-10-CM

## 2021-04-15 DIAGNOSIS — G44.209 TENSION HEADACHE: ICD-10-CM

## 2021-04-15 DIAGNOSIS — R63.5 WEIGHT GAIN: ICD-10-CM

## 2021-04-15 LAB
ALBUMIN SERPL-MCNC: 3.8 G/DL (ref 3.4–5)
ALP SERPL-CCNC: 100 U/L (ref 40–150)
ALT SERPL W P-5'-P-CCNC: 50 U/L (ref 0–50)
ANION GAP SERPL CALCULATED.3IONS-SCNC: 5 MMOL/L (ref 3–14)
AST SERPL W P-5'-P-CCNC: 19 U/L (ref 0–45)
BASOPHILS # BLD AUTO: 0 10E9/L (ref 0–0.2)
BASOPHILS NFR BLD AUTO: 0.4 %
BILIRUB SERPL-MCNC: 0.3 MG/DL (ref 0.2–1.3)
BUN SERPL-MCNC: 14 MG/DL (ref 7–30)
CALCIUM SERPL-MCNC: 8.8 MG/DL (ref 8.5–10.1)
CHLORIDE SERPL-SCNC: 101 MMOL/L (ref 94–109)
CHOLEST SERPL-MCNC: 193 MG/DL
CO2 SERPL-SCNC: 28 MMOL/L (ref 20–32)
CREAT SERPL-MCNC: 0.72 MG/DL (ref 0.52–1.04)
DIFFERENTIAL METHOD BLD: NORMAL
EOSINOPHIL # BLD AUTO: 0.2 10E9/L (ref 0–0.7)
EOSINOPHIL NFR BLD AUTO: 3 %
ERYTHROCYTE [DISTWIDTH] IN BLOOD BY AUTOMATED COUNT: 13.3 % (ref 10–15)
GFR SERPL CREATININE-BSD FRML MDRD: 88 ML/MIN/{1.73_M2}
GLUCOSE SERPL-MCNC: 99 MG/DL (ref 70–99)
HCT VFR BLD AUTO: 42.3 % (ref 35–47)
HDLC SERPL-MCNC: 93 MG/DL
HGB BLD-MCNC: 13.9 G/DL (ref 11.7–15.7)
LDLC SERPL CALC-MCNC: 88 MG/DL
LYMPHOCYTES # BLD AUTO: 2.4 10E9/L (ref 0.8–5.3)
LYMPHOCYTES NFR BLD AUTO: 43 %
MCH RBC QN AUTO: 28.6 PG (ref 26.5–33)
MCHC RBC AUTO-ENTMCNC: 32.9 G/DL (ref 31.5–36.5)
MCV RBC AUTO: 87 FL (ref 78–100)
MONOCYTES # BLD AUTO: 0.7 10E9/L (ref 0–1.3)
MONOCYTES NFR BLD AUTO: 11.6 %
NEUTROPHILS # BLD AUTO: 2.3 10E9/L (ref 1.6–8.3)
NEUTROPHILS NFR BLD AUTO: 42 %
NONHDLC SERPL-MCNC: 100 MG/DL
PLATELET # BLD AUTO: 314 10E9/L (ref 150–450)
POTASSIUM SERPL-SCNC: 4.2 MMOL/L (ref 3.4–5.3)
PROT SERPL-MCNC: 7.4 G/DL (ref 6.8–8.8)
RBC # BLD AUTO: 4.86 10E12/L (ref 3.8–5.2)
SODIUM SERPL-SCNC: 134 MMOL/L (ref 133–144)
TRIGL SERPL-MCNC: 61 MG/DL
TSH SERPL DL<=0.005 MIU/L-ACNC: 3.26 MU/L (ref 0.4–4)
WBC # BLD AUTO: 5.6 10E9/L (ref 4–11)

## 2021-04-15 PROCEDURE — 84443 ASSAY THYROID STIM HORMONE: CPT | Performed by: NURSE PRACTITIONER

## 2021-04-15 PROCEDURE — 85025 COMPLETE CBC W/AUTO DIFF WBC: CPT | Performed by: NURSE PRACTITIONER

## 2021-04-15 PROCEDURE — 36415 COLL VENOUS BLD VENIPUNCTURE: CPT | Performed by: NURSE PRACTITIONER

## 2021-04-15 PROCEDURE — 80053 COMPREHEN METABOLIC PANEL: CPT | Performed by: NURSE PRACTITIONER

## 2021-04-15 PROCEDURE — 80061 LIPID PANEL: CPT | Performed by: NURSE PRACTITIONER

## 2021-08-11 ENCOUNTER — HOSPITAL ENCOUNTER (OUTPATIENT)
Dept: MAMMOGRAPHY | Facility: CLINIC | Age: 66
Discharge: HOME | End: 2021-08-11
Attending: OBSTETRICS & GYNECOLOGY | Admitting: OBSTETRICS & GYNECOLOGY
Payer: COMMERCIAL

## 2021-08-11 DIAGNOSIS — Z12.31 VISIT FOR SCREENING MAMMOGRAM: ICD-10-CM

## 2021-08-11 PROCEDURE — 77063 BREAST TOMOSYNTHESIS BI: CPT

## 2022-04-04 ENCOUNTER — APPOINTMENT (OUTPATIENT)
Dept: URBAN - METROPOLITAN AREA CLINIC 253 | Age: 67
Setting detail: DERMATOLOGY
End: 2022-04-10

## 2022-04-04 VITALS — HEIGHT: 62 IN | RESPIRATION RATE: 14 BRPM | WEIGHT: 293 LBS

## 2022-04-04 DIAGNOSIS — D18.0 HEMANGIOMA: ICD-10-CM

## 2022-04-04 DIAGNOSIS — D22 MELANOCYTIC NEVI: ICD-10-CM

## 2022-04-04 DIAGNOSIS — L82.0 INFLAMED SEBORRHEIC KERATOSIS: ICD-10-CM

## 2022-04-04 DIAGNOSIS — B07.8 OTHER VIRAL WARTS: ICD-10-CM

## 2022-04-04 DIAGNOSIS — Z71.89 OTHER SPECIFIED COUNSELING: ICD-10-CM

## 2022-04-04 DIAGNOSIS — D17 BENIGN LIPOMATOUS NEOPLASM: ICD-10-CM

## 2022-04-04 DIAGNOSIS — L82.1 OTHER SEBORRHEIC KERATOSIS: ICD-10-CM

## 2022-04-04 DIAGNOSIS — L91.8 OTHER HYPERTROPHIC DISORDERS OF THE SKIN: ICD-10-CM

## 2022-04-04 DIAGNOSIS — L81.4 OTHER MELANIN HYPERPIGMENTATION: ICD-10-CM

## 2022-04-04 PROBLEM — D18.01 HEMANGIOMA OF SKIN AND SUBCUTANEOUS TISSUE: Status: ACTIVE | Noted: 2022-04-04

## 2022-04-04 PROBLEM — D22.5 MELANOCYTIC NEVI OF TRUNK: Status: ACTIVE | Noted: 2022-04-04

## 2022-04-04 PROBLEM — D17.22 BENIGN LIPOMATOUS NEOPLASM OF SKIN AND SUBCUTANEOUS TISSUE OF LEFT ARM: Status: ACTIVE | Noted: 2022-04-04

## 2022-04-04 PROCEDURE — OTHER LIQUID NITROGEN: OTHER

## 2022-04-04 PROCEDURE — 99213 OFFICE O/P EST LOW 20 MIN: CPT | Mod: 25

## 2022-04-04 PROCEDURE — 11200 RMVL SKIN TAGS UP TO&INC 15: CPT | Mod: 59

## 2022-04-04 PROCEDURE — OTHER MIPS QUALITY: OTHER

## 2022-04-04 PROCEDURE — OTHER SKIN TAG REMOVAL: OTHER

## 2022-04-04 PROCEDURE — OTHER ADDITIONAL NOTES: OTHER

## 2022-04-04 PROCEDURE — 17110 DESTRUCT B9 LESION 1-14: CPT

## 2022-04-04 PROCEDURE — OTHER COUNSELING: OTHER

## 2022-04-04 ASSESSMENT — LOCATION ZONE DERM
LOCATION ZONE: FACE
LOCATION ZONE: FINGER
LOCATION ZONE: TRUNK
LOCATION ZONE: ARM

## 2022-04-04 ASSESSMENT — LOCATION DETAILED DESCRIPTION DERM
LOCATION DETAILED: INFERIOR THORACIC SPINE
LOCATION DETAILED: LEFT ANTERIOR DISTAL UPPER ARM
LOCATION DETAILED: RIGHT MEDIAL BUCCAL CHEEK
LOCATION DETAILED: LEFT LATERAL MALAR CHEEK
LOCATION DETAILED: RIGHT MIDDLE FINGER DISTAL INTERPHALANGEAL JOINT

## 2022-04-04 ASSESSMENT — LOCATION SIMPLE DESCRIPTION DERM
LOCATION SIMPLE: UPPER BACK
LOCATION SIMPLE: RIGHT CHEEK
LOCATION SIMPLE: LEFT UPPER ARM
LOCATION SIMPLE: RIGHT MIDDLE FINGER
LOCATION SIMPLE: LEFT CHEEK

## 2022-04-04 NOTE — PROCEDURE: LIQUID NITROGEN
Consent: The patient's consent was obtained including but not limited to risks of crusting, scabbing, blistering, scarring, darker or lighter pigmentary change, recurrence, incomplete removal and infection.
Spray Paint Technique: No
Show Topical Anesthesia Variable?: Yes
Spray Paint Text: The liquid nitrogen was applied to the skin utilizing a spray paint frosting technique.
Duration Of Freeze Thaw-Cycle (Seconds): 2
Medical Necessity Clause: This procedure was medically necessary because the lesions that were treated were:
Medical Necessity Information: It is in your best interest to select a reason for this procedure from the list below. All of these items fulfill various CMS LCD requirements except the new and changing color options.
Detail Level: Detailed
Post-Care Instructions: I reviewed with the patient in detail post-care instructions. Patient is to wear sunprotection, and avoid picking at any of the treated lesions. Pt may apply Vaseline to crusted or scabbing areas.
Number Of Freeze-Thaw Cycles: 3 freeze-thaw cycles

## 2022-04-04 NOTE — PROCEDURE: ADDITIONAL NOTES
Detail Level: Zone
Additional Notes: Discussed 30 min excision if pt desires removal
Render Risk Assessment In Note?: no

## 2022-04-04 NOTE — PROCEDURE: SKIN TAG REMOVAL
Medical Necessity Clause: This procedure was medically necessary because the lesions that were treated were:
Detail Level: Detailed
Include Z78.9 (Other Specified Conditions Influencing Health Status) As An Associated Diagnosis?: No
Add Associated Diagnoses If Applicable When Selecting Medical Necessity: Yes
Hemostasis: Pressure
Consent: Written consent obtained and the risks of skin tag removal was reviewed with the patient including but not limited to bleeding, pigmentary change, infection, pain, and remote possibility of scarring.
Medical Necessity Information: It is in your best interest to select a reason for this procedure from the list below. All of these items fulfill various CMS LCD requirements except the new and changing color options.

## 2022-04-08 ENCOUNTER — OFFICE VISIT (OUTPATIENT)
Dept: ORTHOPEDICS | Facility: CLINIC | Age: 67
End: 2022-04-08
Payer: COMMERCIAL

## 2022-04-08 ENCOUNTER — ANCILLARY PROCEDURE (OUTPATIENT)
Dept: GENERAL RADIOLOGY | Facility: CLINIC | Age: 67
End: 2022-04-08
Attending: PHYSICAL MEDICINE & REHABILITATION
Payer: COMMERCIAL

## 2022-04-08 VITALS
OXYGEN SATURATION: 98 % | HEART RATE: 88 BPM | DIASTOLIC BLOOD PRESSURE: 86 MMHG | WEIGHT: 149 LBS | HEIGHT: 62 IN | BODY MASS INDEX: 27.42 KG/M2 | SYSTOLIC BLOOD PRESSURE: 124 MMHG

## 2022-04-08 DIAGNOSIS — M54.17 LUMBOSACRAL RADICULOPATHY: ICD-10-CM

## 2022-04-08 DIAGNOSIS — M51.379 DDD (DEGENERATIVE DISC DISEASE), LUMBOSACRAL: ICD-10-CM

## 2022-04-08 DIAGNOSIS — M47.817 FACET ARTHROPATHY, LUMBOSACRAL: ICD-10-CM

## 2022-04-08 PROCEDURE — 72110 X-RAY EXAM L-2 SPINE 4/>VWS: CPT | Performed by: RADIOLOGY

## 2022-04-08 PROCEDURE — 99204 OFFICE O/P NEW MOD 45 MIN: CPT | Performed by: PHYSICAL MEDICINE & REHABILITATION

## 2022-04-08 RX ORDER — LORAZEPAM 0.5 MG/1
0.5 TABLET ORAL ONCE
Qty: 2 TABLET | Refills: 0 | Status: SHIPPED | OUTPATIENT
Start: 2022-04-08 | End: 2022-04-08

## 2022-04-08 ASSESSMENT — ENCOUNTER SYMPTOMS
ABDOMINAL PAIN: 0
LEG SWELLING: 0
DIFFICULTY URINATING: 0
SEIZURES: 0
WHEEZING: 0
SWOLLEN GLANDS: 0
DIARRHEA: 0
ARTHRALGIAS: 0
BRUISES/BLEEDS EASILY: 0
BLOOD IN STOOL: 0
HOARSE VOICE: 0
MYALGIAS: 0
NERVOUS/ANXIOUS: 1
TROUBLE SWALLOWING: 0
CONSTIPATION: 0
HEMATURIA: 0
VOMITING: 0
DEPRESSION: 0
WEIGHT LOSS: 0
DIZZINESS: 0
FATIGUE: 0
WEIGHT GAIN: 0
INSOMNIA: 0
EYE PAIN: 0
PALPITATIONS: 0
HEARTBURN: 0
FEVER: 0
SHORTNESS OF BREATH: 0
POOR WOUND HEALING: 0
HEADACHES: 1
BOWEL INCONTINENCE: 0
LOSS OF CONSCIOUSNESS: 0
NAUSEA: 0
DYSURIA: 0
COUGH: 1

## 2022-04-08 ASSESSMENT — PAIN SCALES - GENERAL: PAINLEVEL: SEVERE PAIN (7)

## 2022-04-08 NOTE — LETTER
"    4/8/2022         RE: Mylene Vasquez  [address] [place] [address]        Dear Colleague,    Thank you for referring your patient, Mylene Vasquez, to the [facility]. Please see a copy of my visit note below.    PHYSICAL MEDICINE & REHABILITATION / MEDICAL SPINE        Date:  Apr 8, 2022    Name:  Mylene Vasquez  YOB: 1955  MRN:  [id number]          CHIEF COMPLAINT:  Low back pain and radiating pain into the left lower extremity.        HISTORY OF PRESENT ILLNESS:  Ms. Mylene Vasquez is a 67-year-old, right-hand-dominant, female.  She is retired.  She goes to the pool twice per week and does an aquatics class.  Ms. Vasquez states that she is not swimming in this class.    Ms. Mylene Vasquez states she has osteoarthritis in her feet.  She denies any other injuries of her low back, pelvis, hips, thighs, knees, legs, ankles, feet, toes.    Ms. Mylene Vasquez has noted bilateral low back pain for the past 10 years.  This pain has been gradually worsening.  This pain is described as \"stabbing.\"  This pain is worsened with standing and improved with sitting.    Starting in February 2022, Ms. Mylene Vasquez began noting pain radiating into her left lower extremity at night.  Ms. Vasquez is a side sleeper, now she has to sleep on her right side because of the pain in her left lower extremity.  Symptoms radiating into the left lower extremity typically occur at night.  Symptoms radiate from the low back into the left buttock, left posterolateral thigh, left anterolateral thigh, left lateral leg, and into the left dorsal foot.    Sometimes the pain keeps Ms. Mylene Vasquez awake, and sometimes the pain wakes Ms. Vasquez.  Pain is currently rated 6/10.  Pain varies from 3/10 to 10/10.  In terms of pain medications, Ms. Mylene Vasquez is taking gabapentin 300 mg at bedtime 2-3 times per week.  She does find gabapentin helps with her sleep.    Ms. Mylene Vasquez has not tried " acupuncture, injections, massage.  She has noted a little benefit with chiropractic treatments.  She has noted a little benefit with physical therapy.    Ms. Mylene Vasquez notes some weakness in her bilateral lower extremities with ascending and descending stairs.  She denies any give way episodes of her lower extremities.  Ms. Vasquez denies any tripping, stumbling, falling.  She is not using any assistive devices for ambulation.  Ms. Mylene Vasquez denies any numbness, tingling, pins-and-needles.  She denies any saddle anesthesia, bladder incontinence.  Ms. Vasquez has constipation and then will take too much MiraLAX and then occasionally will have a bowel accident.    Ms. Mylene Vasquez denies that her bilateral lower extremities become weak and tired.    Ms. Mylene Vasquez denies any personal or family history of autoimmune diseases, rheumatologic diseases, gout, pseudogout.  She denies any personal or family history of neurologic diseases.  Ms. Vasquez denies any personal or family history of inflammatory bowel diseases.        REVIEW OF SYSTEMS:  Review of Systems     Constitutional:  Negative for fever, weight loss, weight gain and fatigue.   HENT:  Negative for tinnitus, trouble swallowing and hoarse voice.    Eyes:  Negative for pain, eye pain and decreased vision.   Respiratory:   Positive for cough. Negative for shortness of breath and wheezing.    Cardiovascular:  Negative for chest pain, palpitations and leg swelling.   Gastrointestinal:  Negative for heartburn, nausea, vomiting, abdominal pain, diarrhea, constipation, blood in stool and bowel incontinence.   Genitourinary:  Negative for bladder incontinence, dysuria, hematuria and difficulty urinating.   Musculoskeletal:  Negative for myalgias and arthralgias.   Skin:  Negative for itching, poor wound healing and poor wound healing.   Neurological:  Positive for headaches. Negative for dizziness, seizures, loss of consciousness and difficulty walking.    Endo/Heme:  Negative for anemia, swollen glands and bruises/bleeds easily.   Psychiatric/Behavioral:  Negative for depression.          ALLERGIES:  Allergies   Allergen Reactions     Pcn [Penicillins]      itchy         MEDICATIONS:  Current Outpatient Medications   Medication Sig Dispense Refill     LORazepam (ATIVAN) 0.5 MG tablet Take 1 tablet (0.5 mg) by mouth once for 1 dose Take 30 minutes prior to imaging/procedure.  If still anxious, take immediately prior to imaging/procedure. 2 tablet 0     clomiPRAMINE (ANAFRANIL) 50 MG capsule Take three tablets at bedtime       gabapentin (NEURONTIN) 300 MG capsule Take one tab at night for 3 days.  If able to tolerate, take one tab twice daily for 3 days, then take one tab three times daily. 90 capsule 4     PARoxetine (PAXIL) 30 MG tablet Take by mouth every morning       VITAMIN D PO Take by mouth every 7 days           PAST MEDICAL HISTORY:  Past Medical History:   Diagnosis Date     Anxiety      Chronic midline low back pain with right-sided sciatica      Depressive disorder          PAST SURGICAL HISTORY:  Past Surgical History:   Procedure Laterality Date     COLONOSCOPY       COLONOSCOPY N/A 11/26/2018    Procedure: COLONOSCOPY ;  Surgeon: Ilsa Aguirre MD;  Location:  GI     URETHROPLASTY  1968         FAMILY HISTORY:  Family History   Problem Relation Age of Onset     Alcohol/Drug Father      Cancer Father         lung cancer     Lung Cancer Father      Osteoporosis Mother      Colon Cancer No family hx of          SOCIAL HISTORY:  Social History     Socioeconomic History     Marital status:      Spouse name: Not on file     Number of children: Not on file     Years of education: Not on file     Highest education level: Not on file   Occupational History     Not on file   Tobacco Use     Smoking status: Never Smoker     Smokeless tobacco: Never Used   Substance and Sexual Activity     Alcohol use: Not Currently     Comment: very little      "Drug use: Never     Sexual activity: Yes     Partners: Male   Other Topics Concern     Parent/sibling w/ CABG, MI or angioplasty before 65F 55M? Not Asked   Social History Narrative     Not on file     Social Determinants of Health     Financial Resource Strain: Not on file   Food Insecurity: Not on file   Transportation Needs: Not on file   Physical Activity: Not on file   Stress: Not on file   Social Connections: Not on file   Intimate Partner Violence: Not on file   Housing Stability: Not on file         PHYSICAL EXAMINATION:  Vitals:    04/08/22 0917   BP: 124/86   Pulse: 88   SpO2: 98%   Weight: 67.6 kg (149 lb)   Height: 1.562 m (5' 1.5\")       GENERAL:  No acute distress.  Pleasant and cooperative.   PSYCH:  Normal mood and affect.  HEAD:  Normocephalic.  SPEECH:  No dysarthria.  EYES:  No scleral icterus.  EARS:  Hearing is intact to spoken voice.  NOSE/MOUTH:  Wearing a face mask.  LUNGS:  No respiratory distress.  No increased work of breathing.  VASCULAR/PULSES:  Posterior tibial:  Right 2+.  Left 2+.  Dorsalis pedis:  Right 2+.  Left 2+.  LOWER EXTREMITIES: No clubbing, cyanosis, or edema bilaterally.    BALANCE AND GAIT: Patient has reciprocal gait pattern without antalgia.  She is able to heel walk, toe walk, and tandem walk without difficulty.  Double leg squat is performed with a quadriceps dominant pattern.  Single-leg squat on the right is performed with a quadriceps dominant pattern.  Single-leg squat on the left is performed with quadriceps dominant pattern, reduced range of motion, and difficulty.    INSPECTION:  There is no erythema, ecchymosis, deformity, asymmetry, or abnormality of the low back.  There is thoracic kyphosis convex left.    LUMBOPELVIC PALPATION:  Lumbar Spinous Processes: Not tender.  Lumbar Paraspinals:  Right mild tenderness L4-L5.  Left mild tenderness L4-L5.  Posterior Superior Iliac Spine:  Right not tender.  Left not tender.  Iliac Crest:  Right not tender.  Left not " tender.  Sacroiliac Joint:  Right not tender.  Left not tender.  Hip External Rotators:  Right not tender.  Left not tender.  Ischial Tuberosity:  Right not tender.  Left not tender.  Greater Trochanter:  Right slight tenderness.  Left not tender.  Coccyx: Not tender.    THORACOLUMBAR RANGE OF MOTION:  Forward flexion (85 ): Mildly reduced range of motion, does not cause pain, does not cause radiating pain.  Extension (30 ): Moderately reduced range of motion, increases low back pain, does not cause radiating pain.  Lateral bending right (30 ): Moderately reduced range of motion, does not cause pain, does not cause radiating pain.  Lateral bending left (30 ): Moderately reduced range of motion, does not cause pain, does not cause radiating pain.  Twisting right with extension:  Moderately reduced range of motion, increases low back pain, does not cause radiating pain.  Twisting left with extension:  Moderately reduced range of motion, increases low back pain, does not cause radiating pain.  Sacroiliac joint dysfunction:  Right -.  Left -.    HIP RANGE OF MOTION:  Flexion (110 ):  Right 110 .  Left 110 .  Extension (30 ):  Right 30 .  Left 30 .  External rotation (45 ):  Right 45 .  Left 45 .  Internal rotation (35 ):  Right 35 .  Left 35 .    KNEE RANGE OF MOTION:  Extension (0 ):  Right 0 .  Left 0 .  Flexion (135 ):  Right 140 .  Left 140 .    STRENGTH:  Hip flexion:  Right 5/5.  Left 5/5.  Hip abduction:  Right 5/5.  Left 4+/5.  Hip external rotation:  Right 5/5.  Left 4/5.  Hip extension:  Right 5/5.  Left 4+/5.  Knee extension:  Right 5/5.  Left 5/5.  Knee flexion:  Right 5/5.  Left 4+/5.  Ankle dorsiflexion:  Right 5/5.  Left 5/5.  Great toe dorsiflexion:  Right 5/5.  Left 5/5.  Ankle plantar flexion:  Right 5/5.  Left 5/5.    SENSATION:  Intact to light touch along right L2, L3, L4, L5, S1, S2.  Intact to light touch along left L2, L3, L4, L5, S1, S2.    REFLEXES:  Patellar (L2-L4):  Right 2+.  Left  2+.  Medial hamstrings (L5-S1):  Right 2+.  Left 2+.  Achilles (S1-S2):  Right 2+.  Left 2+.  Babinski:  Right downgoing.  Left downgoing.  Forced ankle dorsiflexion (clonus):  Right 0 beats.  Left 0 beats.    LUMBOPELVIC SPECIAL TESTS:  Log roll:  Right -.  Left -.  Straight leg raise:  Right -.  Left -.  Crossover straight leg raise:  Right -.  Left -.  Resisted straight leg raise:  Right -.  Left -.  BERTA:  Right -.  Left -.  FADIR:  Right -.  Left -.  Hip scour:  Right -.  Left -.  Lateral sacral compression:  Right -.  Left -.  Clamshell:  Right -.  Left +.  Ely s:  Right +.  Left +.  Yeoman s:  Right -.  Left -.  Distraction:  Right -.  Left -.  Sacral thrust:  Right -.  Left -.  Noble compression:  Right -.  Left -.        IMAGING:  X-rays of the lumbar spine were ordered today and completed after today's appointment.  I reviewed x-rays of the lumbar spine from 04/08/2022.  There is mild thoracolumbar scoliosis convex right.  There is anterolisthesis of L4 on L5.  There is disc height loss throughout the lumbar spine.  There is facet arthropathy most pronounced bilaterally at L4-L5 and L5-S1.        ASSESSMENT/PLAN:  Ms. Mylene Vasquez is a 67-year-old, right-hand-dominant, female.  History and exam are most consistent with lumbosacral facet arthropathy and a left lumbosacral radiculopathy (likely S1, possibly L5).  Discussed diagnoses, pathophysiologies, and treatment options with Ms. Mylene Vasquez.  Discussed the options of doing nothing/living with it, physical therapy, chiropractic care, oral medications such as gabapentin and pregabalin, lumbosacral epidural corticosteroid injection, lumbosacral facet joint medial branch blocks with following radiofrequency ablations, referral to neurosurgery.  Ms. Mylene Vasquez will be sent for x-rays of her lumbar spine today.  An MRI lumbar spine is being ordered.  Ms. Vasquez is to follow-up in this clinic after the MRI lumbar spine.        Total Time on encounter:   58 minutes were spent on one more or more of the following:  discussion with patient, history, exam, coordinating care, treatment goals, record review, documenting clinical information, and/or data review.      Laurent Nielson MD

## 2022-04-08 NOTE — PATIENT INSTRUCTIONS
Please schedule an MRI.  The [facility] Imaging Scheduling team will call you to schedule your imaging exam in the next 0-3 days.  You can also call them directly at [facility] [phone] ([address], [address], [place], [place]) and [facility] [phone] ([address], [place], [place]) to schedule your appointment.    Please schedule a follow-up appointment after your MRI.  You can schedule this at the , or you can call [phone].

## 2022-04-08 NOTE — PROGRESS NOTES
"PHYSICAL MEDICINE & REHABILITATION / MEDICAL SPINE        Date:  Apr 8, 2022    Name:  Mylene Vasquez  YOB: 1955  MRN:  [id number]          CHIEF COMPLAINT:  Low back pain and radiating pain into the left lower extremity.        HISTORY OF PRESENT ILLNESS:  Ms. Mylene Vasquez is a 67-year-old, right-hand-dominant, female.  She is retired.  She goes to the pool twice per week and does an aquatics class.  Ms. Vasquez states that she is not swimming in this class.    Ms. Mylene Vasquez states she has osteoarthritis in her feet.  She denies any other injuries of her low back, pelvis, hips, thighs, knees, legs, ankles, feet, toes.    Ms. Mylene Vasquez has noted bilateral low back pain for the past 10 years.  This pain has been gradually worsening.  This pain is described as \"stabbing.\"  This pain is worsened with standing and improved with sitting.    Starting in February 2022, Ms. Mylene Vasquez began noting pain radiating into her left lower extremity at night.  Ms. Vasquez is a side sleeper, now she has to sleep on her right side because of the pain in her left lower extremity.  Symptoms radiating into the left lower extremity typically occur at night.  Symptoms radiate from the low back into the left buttock, left posterolateral thigh, left anterolateral thigh, left lateral leg, and into the left dorsal foot.    Sometimes the pain keeps Ms. Mylene Vasquez awake, and sometimes the pain wakes Ms. Vasquez.  Pain is currently rated 6/10.  Pain varies from 3/10 to 10/10.  In terms of pain medications, Ms. Mylene Vasquez is taking gabapentin 300 mg at bedtime 2-3 times per week.  She does find gabapentin helps with her sleep.    Ms. Mylene Vasquez has not tried acupuncture, injections, massage.  She has noted a little benefit with chiropractic treatments.  She has noted a little benefit with physical therapy.    Ms. Mylene Vasquez notes some weakness in her bilateral lower extremities with ascending and " descending stairs.  She denies any give way episodes of her lower extremities.  Ms. Vasquez denies any tripping, stumbling, falling.  She is not using any assistive devices for ambulation.  Ms. Mylene Vasquez denies any numbness, tingling, pins-and-needles.  She denies any saddle anesthesia, bladder incontinence.  Ms. Vasquez has constipation and then will take too much MiraLAX and then occasionally will have a bowel accident.    Ms. Mylene Vasquez denies that her bilateral lower extremities become weak and tired.    Ms. Mylene Vasquez denies any personal or family history of autoimmune diseases, rheumatologic diseases, gout, pseudogout.  She denies any personal or family history of neurologic diseases.  Ms. Vasquez denies any personal or family history of inflammatory bowel diseases.        REVIEW OF SYSTEMS:  Review of Systems     Constitutional:  Negative for fever, weight loss, weight gain and fatigue.   HENT:  Negative for tinnitus, trouble swallowing and hoarse voice.    Eyes:  Negative for pain, eye pain and decreased vision.   Respiratory:   Positive for cough. Negative for shortness of breath and wheezing.    Cardiovascular:  Negative for chest pain, palpitations and leg swelling.   Gastrointestinal:  Negative for heartburn, nausea, vomiting, abdominal pain, diarrhea, constipation, blood in stool and bowel incontinence.   Genitourinary:  Negative for bladder incontinence, dysuria, hematuria and difficulty urinating.   Musculoskeletal:  Negative for myalgias and arthralgias.   Skin:  Negative for itching, poor wound healing and poor wound healing.   Neurological:  Positive for headaches. Negative for dizziness, seizures, loss of consciousness and difficulty walking.   Endo/Heme:  Negative for anemia, swollen glands and bruises/bleeds easily.   Psychiatric/Behavioral:  Negative for depression.          ALLERGIES:  Allergies   Allergen Reactions     Pcn [Penicillins]      itchy         MEDICATIONS:  Current  Outpatient Medications   Medication Sig Dispense Refill     LORazepam (ATIVAN) 0.5 MG tablet Take 1 tablet (0.5 mg) by mouth once for 1 dose Take 30 minutes prior to imaging/procedure.  If still anxious, take immediately prior to imaging/procedure. 2 tablet 0     clomiPRAMINE (ANAFRANIL) 50 MG capsule Take three tablets at bedtime       gabapentin (NEURONTIN) 300 MG capsule Take one tab at night for 3 days.  If able to tolerate, take one tab twice daily for 3 days, then take one tab three times daily. 90 capsule 4     PARoxetine (PAXIL) 30 MG tablet Take by mouth every morning       VITAMIN D PO Take by mouth every 7 days           PAST MEDICAL HISTORY:  Past Medical History:   Diagnosis Date     Anxiety      Chronic midline low back pain with right-sided sciatica      Depressive disorder          PAST SURGICAL HISTORY:  Past Surgical History:   Procedure Laterality Date     COLONOSCOPY       COLONOSCOPY N/A 11/26/2018    Procedure: COLONOSCOPY ;  Surgeon: Ilsa Aguirre MD;  Location: [facility]     URETHROPLASTY  1968         FAMILY HISTORY:  Family History   Problem Relation Age of Onset     Alcohol/Drug Father      Cancer Father         lung cancer     Lung Cancer Father      Osteoporosis Mother      Colon Cancer No family hx of          SOCIAL HISTORY:  Social History     Socioeconomic History     Marital status:      Spouse name: Not on file     Number of children: Not on file     Years of education: Not on file     Highest education level: Not on file   Occupational History     Not on file   Tobacco Use     Smoking status: Never Smoker     Smokeless tobacco: Never Used   Substance and Sexual Activity     Alcohol use: Not Currently     Comment: very little     Drug use: Never     Sexual activity: Yes     Partners: Male   Other Topics Concern     Parent/sibling w/ CABG, MI or angioplasty before 65F 55M? Not Asked   Social History Narrative     Not on file     Social Determinants of Health     Financial  "Resource Strain: Not on file   Food Insecurity: Not on file   Transportation Needs: Not on file   Physical Activity: Not on file   Stress: Not on file   Social Connections: Not on file   Intimate Partner Violence: Not on file   Housing Stability: Not on file         PHYSICAL EXAMINATION:  Vitals:    04/08/22 0917   BP: 124/86   Pulse: 88   SpO2: 98%   Weight: 67.6 kg (149 lb)   Height: 1.562 m (5' 1.5\")       GENERAL:  No acute distress.  Pleasant and cooperative.   PSYCH:  Normal mood and affect.  HEAD:  Normocephalic.  SPEECH:  No dysarthria.  EYES:  No scleral icterus.  EARS:  Hearing is intact to spoken voice.  NOSE/MOUTH:  Wearing a face mask.  LUNGS:  No respiratory distress.  No increased work of breathing.  VASCULAR/PULSES:  Posterior tibial:  Right 2+.  Left 2+.  Dorsalis pedis:  Right 2+.  Left 2+.  LOWER EXTREMITIES: No clubbing, cyanosis, or edema bilaterally.    BALANCE AND GAIT: Patient has reciprocal gait pattern without antalgia.  She is able to heel walk, toe walk, and tandem walk without difficulty.  Double leg squat is performed with a quadriceps dominant pattern.  Single-leg squat on the right is performed with a quadriceps dominant pattern.  Single-leg squat on the left is performed with quadriceps dominant pattern, reduced range of motion, and difficulty.    INSPECTION:  There is no erythema, ecchymosis, deformity, asymmetry, or abnormality of the low back.  There is thoracic kyphosis convex left.    LUMBOPELVIC PALPATION:  Lumbar Spinous Processes: Not tender.  Lumbar Paraspinals:  Right mild tenderness L4-L5.  Left mild tenderness L4-L5.  Posterior Superior Iliac Spine:  Right not tender.  Left not tender.  Iliac Crest:  Right not tender.  Left not tender.  Sacroiliac Joint:  Right not tender.  Left not tender.  Hip External Rotators:  Right not tender.  Left not tender.  Ischial Tuberosity:  Right not tender.  Left not tender.  Greater Trochanter:  Right slight tenderness.  Left not " tender.  Coccyx: Not tender.    THORACOLUMBAR RANGE OF MOTION:  Forward flexion (85 ): Mildly reduced range of motion, does not cause pain, does not cause radiating pain.  Extension (30 ): Moderately reduced range of motion, increases low back pain, does not cause radiating pain.  Lateral bending right (30 ): Moderately reduced range of motion, does not cause pain, does not cause radiating pain.  Lateral bending left (30 ): Moderately reduced range of motion, does not cause pain, does not cause radiating pain.  Twisting right with extension:  Moderately reduced range of motion, increases low back pain, does not cause radiating pain.  Twisting left with extension:  Moderately reduced range of motion, increases low back pain, does not cause radiating pain.  Sacroiliac joint dysfunction:  Right -.  Left -.    HIP RANGE OF MOTION:  Flexion (110 ):  Right 110 .  Left 110 .  Extension (30 ):  Right 30 .  Left 30 .  External rotation (45 ):  Right 45 .  Left 45 .  Internal rotation (35 ):  Right 35 .  Left 35 .    KNEE RANGE OF MOTION:  Extension (0 ):  Right 0 .  Left 0 .  Flexion (135 ):  Right 140 .  Left 140 .    STRENGTH:  Hip flexion:  Right 5/5.  Left 5/5.  Hip abduction:  Right 5/5.  Left 4+/5.  Hip external rotation:  Right 5/5.  Left 4/5.  Hip extension:  Right 5/5.  Left 4+/5.  Knee extension:  Right 5/5.  Left 5/5.  Knee flexion:  Right 5/5.  Left 4+/5.  Ankle dorsiflexion:  Right 5/5.  Left 5/5.  Great toe dorsiflexion:  Right 5/5.  Left 5/5.  Ankle plantar flexion:  Right 5/5.  Left 5/5.    SENSATION:  Intact to light touch along right L2, L3, L4, L5, S1, S2.  Intact to light touch along left L2, L3, L4, L5, S1, S2.    REFLEXES:  Patellar (L2-L4):  Right 2+.  Left 2+.  Medial hamstrings (L5-S1):  Right 2+.  Left 2+.  Achilles (S1-S2):  Right 2+.  Left 2+.  Babinski:  Right downgoing.  Left downgoing.  Forced ankle dorsiflexion (clonus):  Right 0 beats.  Left 0 beats.    LUMBOPELVIC SPECIAL TESTS:  Log roll:   Right -.  Left -.  Straight leg raise:  Right -.  Left -.  Crossover straight leg raise:  Right -.  Left -.  Resisted straight leg raise:  Right -.  Left -.  BERTA:  Right -.  Left -.  FADIR:  Right -.  Left -.  Hip scour:  Right -.  Left -.  Lateral sacral compression:  Right -.  Left -.  Clamshell:  Right -.  Left +.  Ely s:  Right +.  Left +.  Yeoman s:  Right -.  Left -.  Distraction:  Right -.  Left -.  Sacral thrust:  Right -.  Left -.  Noble compression:  Right -.  Left -.        IMAGING:  X-rays of the lumbar spine were ordered today and completed after today's appointment.  I reviewed x-rays of the lumbar spine from 04/08/2022.  There is mild thoracolumbar scoliosis convex right.  There is anterolisthesis of L4 on L5.  There is disc height loss throughout the lumbar spine.  There is facet arthropathy most pronounced bilaterally at L4-L5 and L5-S1.        ASSESSMENT/PLAN:  Ms. Mylene Vasquez is a 67-year-old, right-hand-dominant, female.  History and exam are most consistent with lumbosacral facet arthropathy and a left lumbosacral radiculopathy (likely S1, possibly L5).  Discussed diagnoses, pathophysiologies, and treatment options with Ms. Mylene Vasquez.  Discussed the options of doing nothing/living with it, physical therapy, chiropractic care, oral medications such as gabapentin and pregabalin, lumbosacral epidural corticosteroid injection, lumbosacral facet joint medial branch blocks with following radiofrequency ablations, referral to neurosurgery.  Ms. Mylene Vasquez will be sent for x-rays of her lumbar spine today.  An MRI lumbar spine is being ordered.  Ms. Vasquez is to follow-up in this clinic after the MRI lumbar spine.        Total Time on encounter:  58 minutes were spent on one more or more of the following:  discussion with patient, history, exam, coordinating care, treatment goals, record review, documenting clinical information, and/or data review.      Laurent Nielson MD

## 2022-04-14 ENCOUNTER — TELEPHONE (OUTPATIENT)
Dept: ORTHOPEDICS | Facility: CLINIC | Age: 67
End: 2022-04-14
Payer: COMMERCIAL

## 2022-04-14 DIAGNOSIS — M54.17 LUMBOSACRAL RADICULOPATHY: Primary | ICD-10-CM

## 2022-04-14 DIAGNOSIS — M47.816 FACET ARTHROPATHY, LUMBAR: ICD-10-CM

## 2022-04-14 RX ORDER — LORAZEPAM 0.5 MG/1
0.5 TABLET ORAL ONCE
Qty: 2 TABLET | Refills: 0 | Status: SHIPPED | OUTPATIENT
Start: 2022-04-14 | End: 2022-04-14

## 2022-04-14 NOTE — TELEPHONE ENCOUNTER
For Ms. Mylene Vasquez, signed prescription for lorazepam 0.5 mg p.o. 30 minutes prior to the MRI.  If still anxious, she may repeat lorazepam 0.5 mg p.o. immediately prior to the MRI.    I called and spoke with Mr. Evan Vasquez and relayed that I had sent the prescription to [facility] in [employer] in [place].    Laurent Nielson MD

## 2022-04-14 NOTE — TELEPHONE ENCOUNTER
Phone call stating she has an MRI scheduled for 4/18/22 pm ordered by Dr. Nielson. He had sent an prescription for Lorazepam to [facility] for her.   She does not thinks it is going to get to her in time and they do not deliver on the weekend. She asks that a new prescription be sent to [facility] in [facility] in [place].   Pharmacy set up in [facility].    Will send message to Dr. Nielson's team and have someone get back with her.     She asks that they call her  Jayden Vasquez: [phone], ok to leave message if he doesn't answer.     SAVANAH Edwards, RN

## 2022-04-18 ENCOUNTER — HOSPITAL ENCOUNTER (OUTPATIENT)
Dept: MRI IMAGING | Facility: CLINIC | Age: 67
Discharge: HOME | End: 2022-04-18
Attending: PHYSICAL MEDICINE & REHABILITATION | Admitting: PHYSICAL MEDICINE & REHABILITATION
Payer: COMMERCIAL

## 2022-04-18 DIAGNOSIS — M47.817 FACET ARTHROPATHY, LUMBOSACRAL: ICD-10-CM

## 2022-04-18 DIAGNOSIS — M51.379 DDD (DEGENERATIVE DISC DISEASE), LUMBOSACRAL: ICD-10-CM

## 2022-04-18 DIAGNOSIS — M54.17 LUMBOSACRAL RADICULOPATHY: ICD-10-CM

## 2022-04-18 PROCEDURE — 72148 MRI LUMBAR SPINE W/O DYE: CPT

## 2022-04-25 ENCOUNTER — APPOINTMENT (OUTPATIENT)
Dept: URBAN - METROPOLITAN AREA CLINIC 253 | Age: 67
Setting detail: DERMATOLOGY
End: 2022-04-28

## 2022-04-25 ENCOUNTER — APPOINTMENT (OUTPATIENT)
Age: 67
Setting detail: DERMATOLOGY
End: 2022-04-25

## 2022-04-25 ENCOUNTER — OFFICE VISIT (OUTPATIENT)
Dept: ORTHOPEDICS | Facility: CLINIC | Age: 67
End: 2022-04-25
Payer: COMMERCIAL

## 2022-04-25 VITALS
OXYGEN SATURATION: 99 % | WEIGHT: 149 LBS | BODY MASS INDEX: 27.42 KG/M2 | DIASTOLIC BLOOD PRESSURE: 91 MMHG | HEIGHT: 62 IN | SYSTOLIC BLOOD PRESSURE: 149 MMHG | HEART RATE: 86 BPM

## 2022-04-25 VITALS — HEIGHT: 62 IN | WEIGHT: 145 LBS | RESPIRATION RATE: 15 BRPM

## 2022-04-25 DIAGNOSIS — M54.17 LUMBOSACRAL RADICULOPATHY: ICD-10-CM

## 2022-04-25 DIAGNOSIS — M51.379 DDD (DEGENERATIVE DISC DISEASE), LUMBOSACRAL: ICD-10-CM

## 2022-04-25 DIAGNOSIS — B07.8 OTHER VIRAL WARTS: ICD-10-CM

## 2022-04-25 DIAGNOSIS — M47.817 FACET ARTHROPATHY, LUMBOSACRAL: ICD-10-CM

## 2022-04-25 DIAGNOSIS — R22.2 PARASPINAL MASS: Primary | ICD-10-CM

## 2022-04-25 PROCEDURE — OTHER MIPS QUALITY: OTHER

## 2022-04-25 PROCEDURE — 99212 OFFICE O/P EST SF 10 MIN: CPT

## 2022-04-25 PROCEDURE — 99215 OFFICE O/P EST HI 40 MIN: CPT | Performed by: PHYSICAL MEDICINE & REHABILITATION

## 2022-04-25 PROCEDURE — OTHER ADDITIONAL NOTES: OTHER

## 2022-04-25 PROCEDURE — OTHER COUNSELING: OTHER

## 2022-04-25 RX ORDER — LORAZEPAM 0.5 MG/1
0.5 TABLET ORAL ONCE
Qty: 2 TABLET | Refills: 0 | Status: SHIPPED | OUTPATIENT
Start: 2022-04-25 | End: 2022-04-25

## 2022-04-25 ASSESSMENT — LOCATION ZONE DERM: LOCATION ZONE: FINGER

## 2022-04-25 ASSESSMENT — LOCATION DETAILED DESCRIPTION DERM
LOCATION DETAILED: RIGHT DISTAL DORSAL MIDDLE FINGER
LOCATION DETAILED: RIGHT MIDDLE FINGER DISTAL INTERPHALANGEAL JOINT

## 2022-04-25 ASSESSMENT — LOCATION SIMPLE DESCRIPTION DERM: LOCATION SIMPLE: RIGHT MIDDLE FINGER

## 2022-04-25 ASSESSMENT — PAIN SCALES - GENERAL: PAINLEVEL: SEVERE PAIN (6)

## 2022-04-25 NOTE — PROGRESS NOTES
PHYSICAL MEDICINE & REHABILITATION / MEDICAL SPINE        Date:  Apr 25, 2022    Name:  Mylene Vasquez  YOB: 1955  MRN:  [id number]          CHIEF COMPLAINT:  Left greater than right low back pain with radiation into the left lower extremity.        HISTORY OF PRESENT ILLNESS:  Ms. Mylene Vasquez is a 67-year-old, right-hand-dominant, female.  She is retired.  She goes to the pool twice per week and does an aquatics class.  Ms. Vasquez states that she is not swimming in this class.     Ms. Mylene Vasquez stated she has osteoarthritis in her feet.  She denied any other injuries of her low back, pelvis, hips, thighs, knees, legs, ankles, feet, toes.     Ms. Mylene Vasquez has noted bilateral low back pain for the past 10 years.  This pain has been gradually worsening.  This pain is worsened with standing and improved with sitting.  Starting in February 2022, Ms. Mylene Vasquez began noting pain radiating into her left lower extremity at night.  Ms. Vasquez is a side sleeper, now she has to sleep on her right side because of the pain in her left lower extremity.  Symptoms radiating into the left lower extremity typically occur at night.      Ms. Mylene Vasquez denied any personal or family history of autoimmune diseases, rheumatologic diseases, gout, pseudogout.  She denied any personal or family history of neurologic diseases.  Ms. Vasquez denied any personal or family history of inflammatory bowel diseases.    Ms. Mylene Vasquez was initially seen in this clinic on 04/08/2022.  She returns to clinic today, 04/25/2022.  Ms. Vasquez is accompanied to today's appointment by her , Mr. Evan Vasquez.    Ms. Ms. Mylene Vasquez continues to note low back pain worse on the left than on the right.  Pain extends into the left buttock, left thigh (circumferential), left anterolateral leg, and into the left dorsal foot.  Pain is currently rated 6/10.  Ms. Mylene Vasquez notes numbness and tingling in her  distribution of pain in the left lower extremity.        ALLERGIES:  Allergies   Allergen Reactions     Pcn [Penicillins]      itchy         MEDICATIONS:  Current Outpatient Medications   Medication Sig Dispense Refill     clomiPRAMINE (ANAFRANIL) 50 MG capsule Take three tablets at bedtime       gabapentin (NEURONTIN) 300 MG capsule Take one tab at night for 3 days.  If able to tolerate, take one tab twice daily for 3 days, then take one tab three times daily. 90 capsule 4     PARoxetine (PAXIL) 30 MG tablet Take by mouth every morning       VITAMIN D PO Take by mouth every 7 days           PAST MEDICAL HISTORY:  Past Medical History:   Diagnosis Date     Anxiety      Chronic midline low back pain with right-sided sciatica      Depressive disorder          PAST SURGICAL HISTORY:  Past Surgical History:   Procedure Laterality Date     COLONOSCOPY       COLONOSCOPY N/A 11/26/2018    Procedure: COLONOSCOPY ;  Surgeon: Ilsa Aguirre MD;  Location:  GI     URETHROPLASTY  1968         FAMILY HISTORY:  Family History   Problem Relation Age of Onset     Alcohol/Drug Father      Cancer Father         lung cancer     Lung Cancer Father      Osteoporosis Mother      Colon Cancer No family hx of          SOCIAL HISTORY:  Social History     Socioeconomic History     Marital status:      Spouse name: Not on file     Number of children: Not on file     Years of education: Not on file     Highest education level: Not on file   Occupational History     Not on file   Tobacco Use     Smoking status: Never Smoker     Smokeless tobacco: Never Used   Substance and Sexual Activity     Alcohol use: Not Currently     Comment: very little     Drug use: Never     Sexual activity: Yes     Partners: Male   Other Topics Concern     Parent/sibling w/ CABG, MI or angioplasty before 65F 55M? Not Asked   Social History Narrative     Not on file     Social Determinants of Health     Financial Resource Strain: Not on file   Food  "Insecurity: Not on file   Transportation Needs: Not on file   Physical Activity: Not on file   Stress: Not on file   Social Connections: Not on file   Intimate Partner Violence: Not on file   Housing Stability: Not on file         PHYSICAL EXAMINATION:  Vitals:    04/25/22 0802   BP: (!) 149/91   Pulse: 86   SpO2: 99%   Weight: 67.6 kg (149 lb)   Height: 1.575 m (5' 2\")       GENERAL:  No acute distress.  Pleasant and cooperative.   PSYCH:  Normal mood and affect.  HEAD:  Normocephalic.  SPEECH:  No dysarthria.  EYES:  No scleral icterus.  EARS:  Hearing is intact to spoken voice.  NOSE/MOUTH:  Wearing a face mask.  LUNGS:  No respiratory distress.  No increased work of breathing.        IMAGING:  MRI LUMBAR SPINE WITHOUT CONTRAST April 18, 2022 4:49 PM      HISTORY: Facet arthropathy, lumbosacral. Lumbosacral radiculopathy. DDD (degenerative disc disease), lumbosacral.     TECHNIQUE: Multiplanar multisequence MRI of the lumbar spine without contrast.     COMPARISON: X-rays 4/8/2022.      FINDINGS: Alignment is significant for thoracolumbar cysts. There is also subtle grade 1 anterolisthesis of L4 on L5. Bone marrow demonstrates scattered degenerative endplate change. Marrow edema is present surrounding the right L4-L5 facet joint. Prominent inferior endplate Schmorl's node at L1, and prominent superior endplate Schmorl's node at L3. The conus medullaris is unremarkable terminating at the level of the mid L2 vertebral body. Cauda equina is unremarkable. A mixed signal mass is present embedded within or adjacent to the left posterior paraspinal musculature at the lumbosacral junction which demonstrates multiple prominent flow voids. The mass is incompletely included within the field-of-view but measures at least 6 cm.     Segmental Analysis:      T12-L1: Mild disc height loss. Shallow central bulge. Mild bilateral facet arthropathy. No foraminal or spinal canal stenosis.      L1-L2: Mild disc height loss. Minimal " bulge. Mild bilateral facet arthropathy. No foraminal or spinal canal stenosis.       L2-L3: Mild disc height loss. No herniation. Mild bilateral facet arthropathy. No foraminal or spinal canal stenosis.       L3-L4: Mild disc height loss. Minimal bulge. Mild bilateral facet arthropathy. No foraminal or spinal canal stenosis.       L4-L5: Mild disc height loss. Slight uncovering. Moderate to severe bilateral facet arthropathy with marrow edema surrounding the right facet joint suggestive of active arthropathy. No foraminal stenosis. Mild if any spinal canal stenosis.       L5-S1: Severe disc height loss. Disc bulge, asymmetric to the right with right foraminal protrusion component. Mild bilateral facet arthropathy. Severe right foraminal stenosis. Mild left foraminal stenosis. No spinal canal stenosis.    IMPRESSION:   1. Moderate to severe bilateral facet arthropathy at L4-L5 with marrow edema surrounding the right facet joint suggestive of active arthropathy.  2. Severe foraminal stenosis on the right at L5-S1.  3. Other degenerative changes detailed.  4. Vascular mass within or adjacent to the left posterior paraspinal musculature at the lumbosacral junction measuring at least 6 cm, incompletely characterized. Malignancy cannot be excluded. Further imaging workup would be typically recommended if not previously performed (e.g. thoracic spine MRI with and without contrast and MRA extending to L3).        ASSESSMENT/PLAN:  Ms. Mylene Vasquez is a 67-year-old, right-hand-dominant, female.  She has left lumbosacral radiculopathy (L5, S1).  She has lumbosacral facet arthropathy and lumbosacral degenerative disc disease.  A mass was noted on the left in the paraspinal musculature.    Regarding the left paraspinal mass, discussed this finding and further work-up with MrNaldo and Ms. Vasquez.  Ms. Mylene Vasquez is being sent for MRI of the thoracic spine with and without contrast and MRI of the lumbar spine with and without  contrast.  Ms. Vasquez is to follow-up in this clinic after these MRIs are complete.    Discussed lumbosacral radiculopathy, lumbosacral facet arthropathy, lumbosacral degenerative disc disease with Ms. and Mr. Vasquez.  Discussed the options of doing nothing/living with it, physical therapy, chiropractic care, oral medications such as gabapentin and pregabalin, lumbosacral facet joint medial branch blocks with following radiofrequency ablations, lumbosacral facet joint corticosteroid injections, lumbosacral epidural corticosteroid injection, referral to neurosurgery.  Lumbosacral facet joint corticosteroid injections would be reasonable given the inflammation seen at the right L4-L5 facet joint.        Total Time on encounter:  46 minutes were spent on one more or more of the following:  discussion with patient, history, exam, coordinating care, treatment goals, record review, documenting clinical information, and/or data review.      Laurent Nielson MD

## 2022-04-25 NOTE — PROCEDURE: ADDITIONAL NOTES
Additional Notes: Pared with 15 blade, no wart tissue remaining. Covered w Aquaphor and a bandaid.
Detail Level: Zone
Render Risk Assessment In Note?: no

## 2022-04-25 NOTE — LETTER
4/25/2022         RE: Mylene Vasquez  [address] [place] [address]        Dear Colleague,    Thank you for referring your patient, Mylene Vasquez, to the [facility]. Please see a copy of my visit note below.    PHYSICAL MEDICINE & REHABILITATION / MEDICAL SPINE        Date:  Apr 25, 2022    Name:  Mylene Vasquez  YOB: 1955  MRN:  [id number]          CHIEF COMPLAINT:  Left greater than right low back pain with radiation into the left lower extremity.        HISTORY OF PRESENT ILLNESS:  Ms. Mylene Vasquez is a 67-year-old, right-hand-dominant, female.  She is retired.  She goes to the pool twice per week and does an aquatics class.  Ms. Vasquez states that she is not swimming in this class.     Ms. Mylene Vasquez stated she has osteoarthritis in her feet.  She denied any other injuries of her low back, pelvis, hips, thighs, knees, legs, ankles, feet, toes.     Ms. Mylene Vasquez has noted bilateral low back pain for the past 10 years.  This pain has been gradually worsening.  This pain is worsened with standing and improved with sitting.  Starting in February 2022, Ms. Mylene Vasquez began noting pain radiating into her left lower extremity at night.  Ms. Vasquez is a side sleeper, now she has to sleep on her right side because of the pain in her left lower extremity.  Symptoms radiating into the left lower extremity typically occur at night.      Ms. Mylene Vasquez denied any personal or family history of autoimmune diseases, rheumatologic diseases, gout, pseudogout.  She denied any personal or family history of neurologic diseases.  Ms. Vasquez denied any personal or family history of inflammatory bowel diseases.    Ms. Mylene Vasquez was initially seen in this clinic on 04/08/2022.  She returns to clinic today, 04/25/2022.  Ms. Vasquez is accompanied to today's appointment by her , Mr. Evan Vasquez.    Ms. Ms. Mylene Vasquez continues to note low back pain  worse on the left than on the right.  Pain extends into the left buttock, left thigh (circumferential), left anterolateral leg, and into the left dorsal foot.  Pain is currently rated 6/10.  Ms. Mylene Vasquez notes numbness and tingling in her distribution of pain in the left lower extremity.        ALLERGIES:  Allergies   Allergen Reactions     Pcn [Penicillins]      itchy         MEDICATIONS:  Current Outpatient Medications   Medication Sig Dispense Refill     clomiPRAMINE (ANAFRANIL) 50 MG capsule Take three tablets at bedtime       gabapentin (NEURONTIN) 300 MG capsule Take one tab at night for 3 days.  If able to tolerate, take one tab twice daily for 3 days, then take one tab three times daily. 90 capsule 4     PARoxetine (PAXIL) 30 MG tablet Take by mouth every morning       VITAMIN D PO Take by mouth every 7 days           PAST MEDICAL HISTORY:  Past Medical History:   Diagnosis Date     Anxiety      Chronic midline low back pain with right-sided sciatica      Depressive disorder          PAST SURGICAL HISTORY:  Past Surgical History:   Procedure Laterality Date     COLONOSCOPY       COLONOSCOPY N/A 11/26/2018    Procedure: COLONOSCOPY ;  Surgeon: Ilsa Aguirre MD;  Location:  GI     URETHROPLASTY  1968         FAMILY HISTORY:  Family History   Problem Relation Age of Onset     Alcohol/Drug Father      Cancer Father         lung cancer     Lung Cancer Father      Osteoporosis Mother      Colon Cancer No family hx of          SOCIAL HISTORY:  Social History     Socioeconomic History     Marital status:      Spouse name: Not on file     Number of children: Not on file     Years of education: Not on file     Highest education level: Not on file   Occupational History     Not on file   Tobacco Use     Smoking status: Never Smoker     Smokeless tobacco: Never Used   Substance and Sexual Activity     Alcohol use: Not Currently     Comment: very little     Drug use: Never     Sexual activity: Yes  "    Partners: Male   Other Topics Concern     Parent/sibling w/ CABG, MI or angioplasty before 65F 55M? Not Asked   Social History Narrative     Not on file     Social Determinants of Health     Financial Resource Strain: Not on file   Food Insecurity: Not on file   Transportation Needs: Not on file   Physical Activity: Not on file   Stress: Not on file   Social Connections: Not on file   Intimate Partner Violence: Not on file   Housing Stability: Not on file         PHYSICAL EXAMINATION:  Vitals:    04/25/22 0802   BP: (!) 149/91   Pulse: 86   SpO2: 99%   Weight: 67.6 kg (149 lb)   Height: 1.575 m (5' 2\")       GENERAL:  No acute distress.  Pleasant and cooperative.   PSYCH:  Normal mood and affect.  HEAD:  Normocephalic.  SPEECH:  No dysarthria.  EYES:  No scleral icterus.  EARS:  Hearing is intact to spoken voice.  NOSE/MOUTH:  Wearing a face mask.  LUNGS:  No respiratory distress.  No increased work of breathing.        IMAGING:  MRI LUMBAR SPINE WITHOUT CONTRAST April 18, 2022 4:49 PM      HISTORY: Facet arthropathy, lumbosacral. Lumbosacral radiculopathy. DDD (degenerative disc disease), lumbosacral.     TECHNIQUE: Multiplanar multisequence MRI of the lumbar spine without contrast.     COMPARISON: X-rays 4/8/2022.      FINDINGS: Alignment is significant for thoracolumbar cysts. There is also subtle grade 1 anterolisthesis of L4 on L5. Bone marrow demonstrates scattered degenerative endplate change. Marrow edema is present surrounding the right L4-L5 facet joint. Prominent inferior endplate Schmorl's node at L1, and prominent superior endplate Schmorl's node at L3. The conus medullaris is unremarkable terminating at the level of the mid L2 vertebral body. Cauda equina is unremarkable. A mixed signal mass is present embedded within or adjacent to the left posterior paraspinal musculature at the lumbosacral junction which demonstrates multiple prominent flow voids. The mass is incompletely included within the " field-of-view but measures at least 6 cm.     Segmental Analysis:      T12-L1: Mild disc height loss. Shallow central bulge. Mild bilateral facet arthropathy. No foraminal or spinal canal stenosis.      L1-L2: Mild disc height loss. Minimal bulge. Mild bilateral facet arthropathy. No foraminal or spinal canal stenosis.       L2-L3: Mild disc height loss. No herniation. Mild bilateral facet arthropathy. No foraminal or spinal canal stenosis.       L3-L4: Mild disc height loss. Minimal bulge. Mild bilateral facet arthropathy. No foraminal or spinal canal stenosis.       L4-L5: Mild disc height loss. Slight uncovering. Moderate to severe bilateral facet arthropathy with marrow edema surrounding the right facet joint suggestive of active arthropathy. No foraminal stenosis. Mild if any spinal canal stenosis.       L5-S1: Severe disc height loss. Disc bulge, asymmetric to the right with right foraminal protrusion component. Mild bilateral facet arthropathy. Severe right foraminal stenosis. Mild left foraminal stenosis. No spinal canal stenosis.    IMPRESSION:   1. Moderate to severe bilateral facet arthropathy at L4-L5 with marrow edema surrounding the right facet joint suggestive of active arthropathy.  2. Severe foraminal stenosis on the right at L5-S1.  3. Other degenerative changes detailed.  4. Vascular mass within or adjacent to the left posterior paraspinal musculature at the lumbosacral junction measuring at least 6 cm, incompletely characterized. Malignancy cannot be excluded. Further imaging workup would be typically recommended if not previously performed (e.g. thoracic spine MRI with and without contrast and MRA extending to L3).        ASSESSMENT/PLAN:  Ms. Mylene Vasquez is a 67-year-old, right-hand-dominant, female.  She has left lumbosacral radiculopathy (L5, S1).  She has lumbosacral facet arthropathy and lumbosacral degenerative disc disease.  A mass was noted on the left in the paraspinal  musculature.    Regarding the left paraspinal mass, discussed this finding and further work-up with Mr. and Ms. Vasquez.  Ms. Mylene Vasquez is being sent for MRI of the thoracic spine with and without contrast and MRI of the lumbar spine with and without contrast.  Ms. Vasquez is to follow-up in this clinic after these MRIs are complete.    Discussed lumbosacral radiculopathy, lumbosacral facet arthropathy, lumbosacral degenerative disc disease with Ms. and Mr. Vasquez.  Discussed the options of doing nothing/living with it, physical therapy, chiropractic care, oral medications such as gabapentin and pregabalin, lumbosacral facet joint medial branch blocks with following radiofrequency ablations, lumbosacral facet joint corticosteroid injections, lumbosacral epidural corticosteroid injection, referral to neurosurgery.  Lumbosacral facet joint corticosteroid injections would be reasonable given the inflammation seen at the right L4-L5 facet joint.        Total Time on encounter:  46 minutes were spent on one more or more of the following:  discussion with patient, history, exam, coordinating care, treatment goals, record review, documenting clinical information, and/or data review.      Laurent Nielson MD

## 2022-04-25 NOTE — PATIENT INSTRUCTIONS
Please schedule an MRI.  The [facility] Imaging Scheduling team will call you to schedule your imaging exam in the next 0-3 days.  You can also call them directly at [facility] [phone] ([address], [address], [place], [place]) and [facility] [phone] ([address], [place], [place]) to schedule your appointment.    Please schedule a follow-up appointment after your MRIs.  You can schedule this at the , or you can call [phone].

## 2022-05-06 ENCOUNTER — HOSPITAL ENCOUNTER (OUTPATIENT)
Dept: MRI IMAGING | Facility: CLINIC | Age: 67
Discharge: HOME | End: 2022-05-06
Attending: PHYSICAL MEDICINE & REHABILITATION
Payer: COMMERCIAL

## 2022-05-06 DIAGNOSIS — R22.2 PARASPINAL MASS: ICD-10-CM

## 2022-05-06 PROCEDURE — A9585 GADOBUTROL INJECTION: HCPCS | Performed by: PHYSICAL MEDICINE & REHABILITATION

## 2022-05-06 PROCEDURE — 72157 MRI CHEST SPINE W/O & W/DYE: CPT

## 2022-05-06 PROCEDURE — 255N000002 HC RX 255 OP 636: Performed by: PHYSICAL MEDICINE & REHABILITATION

## 2022-05-06 PROCEDURE — 72158 MRI LUMBAR SPINE W/O & W/DYE: CPT

## 2022-05-06 RX ORDER — GADOBUTROL 604.72 MG/ML
7 INJECTION INTRAVENOUS ONCE
Status: COMPLETED | OUTPATIENT
Start: 2022-05-06 | End: 2022-05-06

## 2022-05-06 RX ADMIN — GADOBUTROL 7 ML: 604.72 INJECTION INTRAVENOUS at 06:55

## 2022-05-09 ENCOUNTER — OFFICE VISIT (OUTPATIENT)
Dept: ORTHOPEDICS | Facility: CLINIC | Age: 67
End: 2022-05-09
Payer: COMMERCIAL

## 2022-05-09 VITALS
HEART RATE: 98 BPM | WEIGHT: 149 LBS | BODY MASS INDEX: 27.42 KG/M2 | DIASTOLIC BLOOD PRESSURE: 85 MMHG | OXYGEN SATURATION: 99 % | SYSTOLIC BLOOD PRESSURE: 111 MMHG | HEIGHT: 62 IN

## 2022-05-09 DIAGNOSIS — R22.2 PARASPINAL MASS: Primary | ICD-10-CM

## 2022-05-09 PROCEDURE — 99215 OFFICE O/P EST HI 40 MIN: CPT | Performed by: PHYSICAL MEDICINE & REHABILITATION

## 2022-05-09 ASSESSMENT — PAIN SCALES - GENERAL: PAINLEVEL: MILD PAIN (3)

## 2022-05-09 NOTE — PROGRESS NOTES
PHYSICAL MEDICINE & REHABILITATION / MEDICAL SPINE        Date:  May 9, 2022    Name:  Mylene Vasquez  YOB: 1955  MRN:  [id number]          CHIEF COMPLAINT:  Mid back pain radiating into the left lower extremity.        HISTORY OF PRESENT ILLNESS:  Ms. Mylene Vasquez is a 67-year-old, right-hand-dominant, female.  She is retired.  She goes to the pool twice per week and does an aquatics class.  Ms. Vasquez stated that she is not swimming in this class.     Ms. Mylene Vasquez stated she has osteoarthritis in her feet.  She denied any other injuries of her low back, pelvis, hips, thighs, knees, legs, ankles, feet, toes.     Ms. Mylene Vasquez has noted bilateral low back pain for the past 10 years.  This pain has been gradually worsening.  This pain is worsened with standing and improved with sitting.  Starting in February 2022, Ms. Mylene Vasquez began noting pain radiating into her left lower extremity at night.  Ms. Vasquez is a side sleeper, now she has to sleep on her right side because of the pain in her left lower extremity.  Symptoms radiating into the left lower extremity typically occur at night.      Ms. Mylene Vasquez denied any personal or family history of autoimmune diseases, rheumatologic diseases, gout, pseudogout.  She denied any personal or family history of neurologic diseases.  Ms. Vasquez denied any personal or family history of inflammatory bowel diseases.     Ms. Mylene Vasquez was last seen in this clinic on 04/25/2022.  She returns to clinic today, 05/09/2022.  Ms. Vasquez is accompanied to today's appointment by her , Mr. Evan Vasquez.    Ms. Mylene Vasquez continues to have low back pain with radiation to her left lower extremity.  Pain is currently rated as 2/10.  Pain varies from 2/10 to 7/10.  Ms. Vasquez really does not notice any pain on the left in the area of the thoracolumbar junction.        ALLERGIES:  Allergies   Allergen Reactions     Pcn [Penicillins]      itchy          MEDICATIONS:  Current Outpatient Medications   Medication Sig Dispense Refill     clomiPRAMINE (ANAFRANIL) 50 MG capsule Take three tablets at bedtime       gabapentin (NEURONTIN) 300 MG capsule Take one tab at night for 3 days.  If able to tolerate, take one tab twice daily for 3 days, then take one tab three times daily. 90 capsule 4     PARoxetine (PAXIL) 30 MG tablet Take by mouth every morning       VITAMIN D PO Take by mouth every 7 days           PAST MEDICAL HISTORY:  Past Medical History:   Diagnosis Date     Anxiety      Chronic midline low back pain with right-sided sciatica      Depressive disorder          PAST SURGICAL HISTORY:  Past Surgical History:   Procedure Laterality Date     COLONOSCOPY       COLONOSCOPY N/A 11/26/2018    Procedure: COLONOSCOPY ;  Surgeon: Ilsa Aguirre MD;  Location:  GI     URETHROPLASTY  1968         FAMILY HISTORY:  Family History   Problem Relation Age of Onset     Alcohol/Drug Father      Cancer Father         lung cancer     Lung Cancer Father      Osteoporosis Mother      Colon Cancer No family hx of          SOCIAL HISTORY:  Social History     Socioeconomic History     Marital status:      Spouse name: Not on file     Number of children: Not on file     Years of education: Not on file     Highest education level: Not on file   Occupational History     Not on file   Tobacco Use     Smoking status: Never Smoker     Smokeless tobacco: Never Used   Substance and Sexual Activity     Alcohol use: Not Currently     Comment: very little     Drug use: Never     Sexual activity: Yes     Partners: Male   Other Topics Concern     Parent/sibling w/ CABG, MI or angioplasty before 65F 55M? Not Asked   Social History Narrative     Not on file     Social Determinants of Health     Financial Resource Strain: Not on file   Food Insecurity: Not on file   Transportation Needs: Not on file   Physical Activity: Not on file   Stress: Not on file   Social Connections:  "Not on file   Intimate Partner Violence: Not on file   Housing Stability: Not on file         PHYSICAL EXAMINATION:  Vitals:    05/09/22 0836   BP: 111/85   Pulse: 98   SpO2: 99%   Weight: 67.6 kg (149 lb)   Height: 1.575 m (5' 2\")       GENERAL:  No acute distress.  Pleasant and cooperative.   PSYCH:  Normal mood and affect.  HEAD:  Normocephalic.  SPEECH:  No dysarthria.  EYES:  No scleral icterus.  EARS:  Hearing is intact to spoken voice.  NOSE/MOUTH:  Wearing a face mask.  LUNGS:  No respiratory distress.  No increased work of breathing.    INSPECTION:  There is no erythema or ecchymosis of the upper, mid, low back.  There is fullness noted in the paraspinal musculature on the left at the thoracolumbar junction.    THORACIC PALPATION:  Thoracic Spinous Processes: Not tender.  Thoracic Paraspinals:  Right not tender.  Left not tender.  Levator Scapulae at the Scapular Insertion:  Right not tender.  Left not tender.  Rhomboid Minor:  Right not tender.  Left not tender.  Rhomboid Major:  Right not tender.  Left not tender.    LUMBOPELVIC PALPATION:  Lumbar Spinous Processes: Mild tenderness L4 L5.  Lumbar Paraspinals:  Right mild tenderness L4, L5.  Left mild tenderness L4, L5 r.  Posterior Superior Iliac Spine:  Right not tender.  Left not tender.  Iliac Crest:  Right not tender.  Left not tender.  Sacroiliac Joint:  Right not tender.  Left not tender.  Hip External Rotators:  Right not tender.  Left not tender.  Greater Trochanter:  Right not tender.  Left not tender.        IMAGING:  MRI LUMBAR SPINE WITHOUT AND WITH CONTRAST   5/6/2022 8:13 AM      HISTORY: Paraspinal mass.      TECHNIQUE: Multiplanar multisequence MRI of the lumbar spine without and with 7 mL Gadavist.      COMPARISON: Lumbar spine MRI dated 4/18/2022. Correlation also made with thoracic spine MRI of 5/6/2022.      FINDINGS: There is transitional anatomy at the lumbosacral junction. In keeping with nomenclature used on previous lumbar spine " MRI report of 4/18/2022, I will presume that there are five lumbar vertebral bodies with a transitional partially lumbarized S1 vertebra. There is a rudimentary S1-S2 intervertebral disc.     Unchanged chronic Schmorl's nodes along the L1 inferior endplate and involving the L3 superior endplate. No new vertebral body height loss identified. There is unchanged minimal degenerative anterolisthesis of L4 on L5. Sagittal alignment otherwise appears normal. Minimal Modic type I degenerative endplate signal changes at L1-L2 and L5-S1. Nonspecific edema-like marrow signal change and enhancement centered about the right greater than left L4-L5 facet joints, as before. Although nonspecific, this may represent reactive marrow signal changes in the setting of degenerative facet arthropathy, possibly with an active/inflammatory component. Clinical correlation suggested. Bone marrow signal otherwise appears unremarkable. There is a normal appearance of the distal spinal cord with the conus terminating at L2. 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 Diffuse intervertebral disc desiccation.     As before, there is partial visualization of an irregular somewhat heterogeneous enhancing mass centered in the left posterior paraspinous/erector spinae musculature extending from the lower thoracic region down to the level of L2. As before, multiple prominent flow voids are seen within this mass. Please see separate report from thoracic spine MRI of same day for additional details. Otherwise, the visualized paraspinous soft tissues appear unremarkable. Mild degenerative changes of the left sacroiliac joint anteriorly are noted.     There has been no significant change in multilevel degenerative disc disease, posterior/posterolateral endplate spurring, and degenerative facet disease compared to the prior exam, with varying degrees of spinal canal/lateral recess and neural foraminal stenosis, as detailed on the prior MRI report in a  level by level fashion. No high-grade spinal canal stenosis is identified. Mild multilevel lower lumbar lateral recess narrowing, as before. Unchanged severe right L5-S1 neural foraminal stenosis with lesser degrees of neural foraminal narrowing elsewhere.    IMPRESSION:  1. Redemonstration of an irregular enhancing mass centered in the left posterior paraspinous/erector spinae musculature with multiple prominent internal flow voids extending from the level of T10 down to the level of L2. Please see separate report from the thoracic spine MRI of same day for additional details. Primary differential considerations include a vascular malformation with possible arteriovenous shunting component, or a highly vascularized neoplasm. Consider cross-sectional or conventional diagnostic spinal angiography. Neurointerventional radiology/neuro-endovascular specialty consultation is suggested.  2. Unchanged multilevel degenerative findings in the lumbar spine, as described. Unchanged severe right L5-S1 neural foraminal stenosis. No high-grade central spinal canal stenosis.       MRI THORACIC SPINE WITHOUT CONTRAST  5/6/2022 8:12 AM      HISTORY: Paraspinal mass.     TECHNIQUE: Multiplanar multisequence MRI of the thoracic spine without contrast.     COMPARISON: None available. Correlation is made with MRI of the lumbar spine 4/18/2022.     FINDINGS: Exaggerated thoracic kyphosis measuring approximately 64 degrees from the superior endplate of T1 to the inferior endplate of T12. Sagittal alignment otherwise appears normal. Chronic appearing Schmorl's node along the inferior endplate of L1. Tiny Schmorl's nodes/degenerative endplate irregularity at the T11-T12 level. Minimal chronic-appearing anterior wedging of the T6 vertebral body. Otherwise normal vertebral body heights. No findings to suggest recent thoracic compression fracture. Mild presumed Modic type I degenerative endplate signal change involving the T9 inferior  endplate. Small ovoid T2/STIR hyperintense enhancing lesion in the left T9 lamina (series 5 image 12) measuring approximately 5-6 mm, without definite correlate on the sagittal T1-weighted sequence. This is nonspecific, but could potentially  represent an atypical intraosseous hemangioma. Otherwise unremarkable bone marrow signal.     There is an irregular expansile T2 hyperintense enhancing mass centered in the left posterior paraspinous/erector spinae musculature with multiple prominent flow voids seen in this lesion. The mass measures up to approximately 3.4 x 4.7 cm in the axial plane and up to approximately 8.6 cm in the craniocaudal dimension. The superior most aspect of the lesion is seen at the level of T10, and the inferior most aspect of the lesion is seen at the level of L2. There appear to be multiple enlarged flow voids in the left paravertebral region that may connect with prominent flow voids within this mass.     Mild multilevel degenerative disc height loss. The facet joints appear overall within normal limits. Small central disc herniations at T4-T5 and T6-T7 are noted. Small right central disc protrusion at T7-T8. Small central disc herniation at T8-T9. Shallow right central protrusion at T11-T12. Shallow disc bulges are seen elsewhere. There is mild mass effect of ventral thecal sac at multiple levels, without significant mass effect on the spinal cord. No significant spinal canal stenosis. No high-grade neural foraminal stenosis seen.    IMPRESSION:  1. Enhancing mass with multiple prominent internal flow voids centered in the left posterior paraspinous musculature at the levels of T10-L2. Although nonspecific, given the multiple prominent flow voids in the lesion, this is concerning for a vascular malformation, potentially with arteriovenous shunting component. A highly vascularized neoplasm is also possible. Consider cross-sectional or conventional diagnostic spinal angiography for further  characterization. Neurointerventional radiology/neuro-endovascular specialty consultation is suggested.  2. Mild multilevel degenerative changes in the thoracic spine, as described. No high-grade spinal canal or neural foraminal stenosis.  3. Exaggerated thoracic kyphosis.        MRI LUMBAR SPINE WITHOUT CONTRAST April 18, 2022 4:49 PM      HISTORY: Facet arthropathy, lumbosacral. Lumbosacral radiculopathy. DDD (degenerative disc disease), lumbosacral.     TECHNIQUE: Multiplanar multisequence MRI of the lumbar spine without  contrast.     COMPARISON: X-rays 4/8/2022.      FINDINGS: Alignment is significant for thoracolumbar cysts. There is also subtle grade 1 anterolisthesis of L4 on L5. Bone marrow demonstrates scattered degenerative endplate change. Marrow edema is present surrounding the right L4-L5 facet joint. Prominent inferior endplate Schmorl's node at L1, and prominent superior endplate Schmorl's node at L3. The conus medullaris is unremarkable terminating at the level of the mid L2 vertebral body. Cauda equina is unremarkable. A mixed signal mass is present embedded within or adjacent to the left posterior paraspinal musculature at the lumbosacral junction which demonstrates multiple prominent flow voids. The mass is incompletely included within the field-of-view but measures at least 6 cm.     Segmental Analysis:      T12-L1: Mild disc height loss. Shallow central bulge. Mild bilateral facet arthropathy. No foraminal or spinal canal stenosis.      L1-L2: Mild disc height loss. Minimal bulge. Mild bilateral facet arthropathy. No foraminal or spinal canal stenosis.       L2-L3: Mild disc height loss. No herniation. Mild bilateral facet arthropathy. No foraminal or spinal canal stenosis.       L3-L4: Mild disc height loss. Minimal bulge. Mild bilateral facet arthropathy. No foraminal or spinal canal stenosis.       L4-L5: Mild disc height loss. Slight uncovering. Moderate to severe bilateral facet  arthropathy with marrow edema surrounding the right facet joint suggestive of active arthropathy. No foraminal stenosis. Mild if any spinal canal stenosis.       L5-S1: Severe disc height loss. Disc bulge, asymmetric to the right with right foraminal protrusion component. Mild bilateral facet arthropathy. Severe right foraminal stenosis. Mild left foraminal stenosis. No spinal canal stenosis.    IMPRESSION:   1. Moderate to severe bilateral facet arthropathy at L4-L5 with marrow edema surrounding the right facet joint suggestive of active arthropathy.  2. Severe foraminal stenosis on the right at L5-S1.  3. Other degenerative changes detailed.  4. Vascular mass within or adjacent to the left posterior paraspinal musculature at the lumbosacral junction measuring at least 6 cm, incompletely characterized. Malignancy cannot be excluded. Further imaging workup would be typically recommended if not previously performed (e.g. thoracic spine MRI with and without contrast and MRA extending to L3).        ASSESSMENT/PLAN:  Ms. Mylene Vasquez is a 67-year-old, right-hand-dominant, female.  She has a left lumbosacral radiculopathy (L5, S1).  She does have lumbosacral facet arthropathy and lumbosacral degenerative disc disease.    Ms. Mylene Vaqsuez was noted to have a mass in the left paraspinal musculature near the thoracolumbar junction.  MRIs of the thoracic and lumbar spine with and without contrast have been performed.  An enhancing mass was noted in the left paraspinal musculature from T10-L2.  Discussed this finding with Ms. and Mr. Vasquez.  Ms. Mylene Vasquez is being referred to interventional radiology for biopsy of this mass.  Ms. Vasquez is to follow-up in this clinic after the biopsy of her left paraspinal mass.        Total Time on encounter:  64 minutes were spent on one more or more of the following:  discussion with patient, history, exam, coordinating care, treatment goals, record review, documenting clinical  information, and/or data review.      Laurent Nielson MD

## 2022-05-09 NOTE — LETTER
5/9/2022         RE: Mylene Vasquez  [address] [place] [address]        Dear Colleague,    Thank you for referring your patient, Mylene Vasquez, to the [facility]. Please see a copy of my visit note below.    PHYSICAL MEDICINE & REHABILITATION / MEDICAL SPINE        Date:  May 9, 2022    Name:  Mylene Vasquez  YOB: 1955  MRN:  [id number]          CHIEF COMPLAINT:  Mid back pain radiating into the left lower extremity.        HISTORY OF PRESENT ILLNESS:  Ms. Mylene Vasquez is a 67-year-old, right-hand-dominant, female.  She is retired.  She goes to the pool twice per week and does an aquatics class.  Ms. Vasquez stated that she is not swimming in this class.     Ms. Mylene Vasquez stated she has osteoarthritis in her feet.  She denied any other injuries of her low back, pelvis, hips, thighs, knees, legs, ankles, feet, toes.     Ms. Mylene Vasquez has noted bilateral low back pain for the past 10 years.  This pain has been gradually worsening.  This pain is worsened with standing and improved with sitting.  Starting in February 2022, Ms. Mylene Vasquez began noting pain radiating into her left lower extremity at night.  Ms. Vasquez is a side sleeper, now she has to sleep on her right side because of the pain in her left lower extremity.  Symptoms radiating into the left lower extremity typically occur at night.      Ms. Mylene Vasquez denied any personal or family history of autoimmune diseases, rheumatologic diseases, gout, pseudogout.  She denied any personal or family history of neurologic diseases.  Ms. Vasquez denied any personal or family history of inflammatory bowel diseases.     Ms. Mylene Vasquez was last seen in this clinic on 04/25/2022.  She returns to clinic today, 05/09/2022.  Ms. Vasquez is accompanied to today's appointment by her , Mr. Evan Vasquez.    Ms. Mylene Vasquez continues to have low back pain with radiation to her left lower  extremity.  Pain is currently rated as 2/10.  Pain varies from 2/10 to 7/10.  Ms. Vasquez really does not notice any pain on the left in the area of the thoracolumbar junction.        ALLERGIES:  Allergies   Allergen Reactions     Pcn [Penicillins]      itchy         MEDICATIONS:  Current Outpatient Medications   Medication Sig Dispense Refill     clomiPRAMINE (ANAFRANIL) 50 MG capsule Take three tablets at bedtime       gabapentin (NEURONTIN) 300 MG capsule Take one tab at night for 3 days.  If able to tolerate, take one tab twice daily for 3 days, then take one tab three times daily. 90 capsule 4     PARoxetine (PAXIL) 30 MG tablet Take by mouth every morning       VITAMIN D PO Take by mouth every 7 days           PAST MEDICAL HISTORY:  Past Medical History:   Diagnosis Date     Anxiety      Chronic midline low back pain with right-sided sciatica      Depressive disorder          PAST SURGICAL HISTORY:  Past Surgical History:   Procedure Laterality Date     COLONOSCOPY       COLONOSCOPY N/A 11/26/2018    Procedure: COLONOSCOPY ;  Surgeon: Ilsa Aguirre MD;  Location:  GI     URETHROPLASTY  1968         FAMILY HISTORY:  Family History   Problem Relation Age of Onset     Alcohol/Drug Father      Cancer Father         lung cancer     Lung Cancer Father      Osteoporosis Mother      Colon Cancer No family hx of          SOCIAL HISTORY:  Social History     Socioeconomic History     Marital status:      Spouse name: Not on file     Number of children: Not on file     Years of education: Not on file     Highest education level: Not on file   Occupational History     Not on file   Tobacco Use     Smoking status: Never Smoker     Smokeless tobacco: Never Used   Substance and Sexual Activity     Alcohol use: Not Currently     Comment: very little     Drug use: Never     Sexual activity: Yes     Partners: Male   Other Topics Concern     Parent/sibling w/ CABG, MI or angioplasty before 65F 55M? Not Asked  "  Social History Narrative     Not on file     Social Determinants of Health     Financial Resource Strain: Not on file   Food Insecurity: Not on file   Transportation Needs: Not on file   Physical Activity: Not on file   Stress: Not on file   Social Connections: Not on file   Intimate Partner Violence: Not on file   Housing Stability: Not on file         PHYSICAL EXAMINATION:  Vitals:    05/09/22 0836   BP: 111/85   Pulse: 98   SpO2: 99%   Weight: 67.6 kg (149 lb)   Height: 1.575 m (5' 2\")       GENERAL:  No acute distress.  Pleasant and cooperative.   PSYCH:  Normal mood and affect.  HEAD:  Normocephalic.  SPEECH:  No dysarthria.  EYES:  No scleral icterus.  EARS:  Hearing is intact to spoken voice.  NOSE/MOUTH:  Wearing a face mask.  LUNGS:  No respiratory distress.  No increased work of breathing.    INSPECTION:  There is no erythema or ecchymosis of the upper, mid, low back.  There is fullness noted in the paraspinal musculature on the left at the thoracolumbar junction.    THORACIC PALPATION:  Thoracic Spinous Processes: Not tender.  Thoracic Paraspinals:  Right not tender.  Left not tender.  Levator Scapulae at the Scapular Insertion:  Right not tender.  Left not tender.  Rhomboid Minor:  Right not tender.  Left not tender.  Rhomboid Major:  Right not tender.  Left not tender.    LUMBOPELVIC PALPATION:  Lumbar Spinous Processes: Mild tenderness L4 L5.  Lumbar Paraspinals:  Right mild tenderness L4, L5.  Left mild tenderness L4, L5 r.  Posterior Superior Iliac Spine:  Right not tender.  Left not tender.  Iliac Crest:  Right not tender.  Left not tender.  Sacroiliac Joint:  Right not tender.  Left not tender.  Hip External Rotators:  Right not tender.  Left not tender.  Greater Trochanter:  Right not tender.  Left not tender.        IMAGING:  MRI LUMBAR SPINE WITHOUT AND WITH CONTRAST   5/6/2022 8:13 AM      HISTORY: Paraspinal mass.      TECHNIQUE: Multiplanar multisequence MRI of the lumbar spine without and " with 7 mL Gadavist.      COMPARISON: Lumbar spine MRI dated 4/18/2022. Correlation also made with thoracic spine MRI of 5/6/2022.      FINDINGS: There is transitional anatomy at the lumbosacral junction. In keeping with nomenclature used on previous lumbar spine MRI report of 4/18/2022, I will presume that there are five lumbar vertebral bodies with a transitional partially lumbarized S1 vertebra. There is a rudimentary S1-S2 intervertebral disc.     Unchanged chronic Schmorl's nodes along the L1 inferior endplate and involving the L3 superior endplate. No new vertebral body height loss identified. There is unchanged minimal degenerative anterolisthesis of L4 on L5. Sagittal alignment otherwise appears normal. Minimal Modic type I degenerative endplate signal changes at L1-L2 and L5-S1. Nonspecific edema-like marrow signal change and enhancement centered about the right greater than left L4-L5 facet joints, as before. Although nonspecific, this may represent reactive marrow signal changes in the setting of degenerative facet arthropathy, possibly with an active/inflammatory component. Clinical correlation suggested. Bone marrow signal otherwise appears unremarkable. There is a normal appearance of the distal spinal cord with the conus terminating at L2. 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 Diffuse intervertebral disc desiccation.     As before, there is partial visualization of an irregular somewhat heterogeneous enhancing mass centered in the left posterior paraspinous/erector spinae musculature extending from the lower thoracic region down to the level of L2. As before, multiple prominent flow voids are seen within this mass. Please see separate report from thoracic spine MRI of same day for additional details. Otherwise, the visualized paraspinous soft tissues appear unremarkable. Mild degenerative changes of the left sacroiliac joint anteriorly are noted.     There has been no significant change in  multilevel degenerative disc disease, posterior/posterolateral endplate spurring, and degenerative facet disease compared to the prior exam, with varying degrees of spinal canal/lateral recess and neural foraminal stenosis, as detailed on the prior MRI report in a level by level fashion. No high-grade spinal canal stenosis is identified. Mild multilevel lower lumbar lateral recess narrowing, as before. Unchanged severe right L5-S1 neural foraminal stenosis with lesser degrees of neural foraminal narrowing elsewhere.    IMPRESSION:  1. Redemonstration of an irregular enhancing mass centered in the left posterior paraspinous/erector spinae musculature with multiple prominent internal flow voids extending from the level of T10 down to the level of L2. Please see separate report from the thoracic spine MRI of same day for additional details. Primary differential considerations include a vascular malformation with possible arteriovenous shunting component, or a highly vascularized neoplasm. Consider cross-sectional or conventional diagnostic spinal angiography. Neurointerventional radiology/neuro-endovascular specialty consultation is suggested.  2. Unchanged multilevel degenerative findings in the lumbar spine, as described. Unchanged severe right L5-S1 neural foraminal stenosis. No high-grade central spinal canal stenosis.       MRI THORACIC SPINE WITHOUT CONTRAST  5/6/2022 8:12 AM      HISTORY: Paraspinal mass.     TECHNIQUE: Multiplanar multisequence MRI of the thoracic spine without contrast.     COMPARISON: None available. Correlation is made with MRI of the lumbar spine 4/18/2022.     FINDINGS: Exaggerated thoracic kyphosis measuring approximately 64 degrees from the superior endplate of T1 to the inferior endplate of T12. Sagittal alignment otherwise appears normal. Chronic appearing Schmorl's node along the inferior endplate of L1. Tiny Schmorl's nodes/degenerative endplate irregularity at the T11-T12 level.  Minimal chronic-appearing anterior wedging of the T6 vertebral body. Otherwise normal vertebral body heights. No findings to suggest recent thoracic compression fracture. Mild presumed Modic type I degenerative endplate signal change involving the T9 inferior endplate. Small ovoid T2/STIR hyperintense enhancing lesion in the left T9 lamina (series 5 image 12) measuring approximately 5-6 mm, without definite correlate on the sagittal T1-weighted sequence. This is nonspecific, but could potentially  represent an atypical intraosseous hemangioma. Otherwise unremarkable bone marrow signal.     There is an irregular expansile T2 hyperintense enhancing mass centered in the left posterior paraspinous/erector spinae musculature with multiple prominent flow voids seen in this lesion. The mass measures up to approximately 3.4 x 4.7 cm in the axial plane and up to approximately 8.6 cm in the craniocaudal dimension. The superior most aspect of the lesion is seen at the level of T10, and the inferior most aspect of the lesion is seen at the level of L2. There appear to be multiple enlarged flow voids in the left paravertebral region that may connect with prominent flow voids within this mass.     Mild multilevel degenerative disc height loss. The facet joints appear overall within normal limits. Small central disc herniations at T4-T5 and T6-T7 are noted. Small right central disc protrusion at T7-T8. Small central disc herniation at T8-T9. Shallow right central protrusion at T11-T12. Shallow disc bulges are seen elsewhere. There is mild mass effect of ventral thecal sac at multiple levels, without significant mass effect on the spinal cord. No significant spinal canal stenosis. No high-grade neural foraminal stenosis seen.    IMPRESSION:  1. Enhancing mass with multiple prominent internal flow voids centered in the left posterior paraspinous musculature at the levels of T10-L2. Although nonspecific, given the multiple prominent  flow voids in the lesion, this is concerning for a vascular malformation, potentially with arteriovenous shunting component. A highly vascularized neoplasm is also possible. Consider cross-sectional or conventional diagnostic spinal angiography for further characterization. Neurointerventional radiology/neuro-endovascular specialty consultation is suggested.  2. Mild multilevel degenerative changes in the thoracic spine, as described. No high-grade spinal canal or neural foraminal stenosis.  3. Exaggerated thoracic kyphosis.        MRI LUMBAR SPINE WITHOUT CONTRAST April 18, 2022 4:49 PM      HISTORY: Facet arthropathy, lumbosacral. Lumbosacral radiculopathy. DDD (degenerative disc disease), lumbosacral.     TECHNIQUE: Multiplanar multisequence MRI of the lumbar spine without  contrast.     COMPARISON: X-rays 4/8/2022.      FINDINGS: Alignment is significant for thoracolumbar cysts. There is also subtle grade 1 anterolisthesis of L4 on L5. Bone marrow demonstrates scattered degenerative endplate change. Marrow edema is present surrounding the right L4-L5 facet joint. Prominent inferior endplate Schmorl's node at L1, and prominent superior endplate Schmorl's node at L3. The conus medullaris is unremarkable terminating at the level of the mid L2 vertebral body. Cauda equina is unremarkable. A mixed signal mass is present embedded within or adjacent to the left posterior paraspinal musculature at the lumbosacral junction which demonstrates multiple prominent flow voids. The mass is incompletely included within the field-of-view but measures at least 6 cm.     Segmental Analysis:      T12-L1: Mild disc height loss. Shallow central bulge. Mild bilateral facet arthropathy. No foraminal or spinal canal stenosis.      L1-L2: Mild disc height loss. Minimal bulge. Mild bilateral facet arthropathy. No foraminal or spinal canal stenosis.       L2-L3: Mild disc height loss. No herniation. Mild bilateral facet arthropathy. No  foraminal or spinal canal stenosis.       L3-L4: Mild disc height loss. Minimal bulge. Mild bilateral facet arthropathy. No foraminal or spinal canal stenosis.       L4-L5: Mild disc height loss. Slight uncovering. Moderate to severe bilateral facet arthropathy with marrow edema surrounding the right facet joint suggestive of active arthropathy. No foraminal stenosis. Mild if any spinal canal stenosis.       L5-S1: Severe disc height loss. Disc bulge, asymmetric to the right with right foraminal protrusion component. Mild bilateral facet arthropathy. Severe right foraminal stenosis. Mild left foraminal stenosis. No spinal canal stenosis.    IMPRESSION:   1. Moderate to severe bilateral facet arthropathy at L4-L5 with marrow edema surrounding the right facet joint suggestive of active arthropathy.  2. Severe foraminal stenosis on the right at L5-S1.  3. Other degenerative changes detailed.  4. Vascular mass within or adjacent to the left posterior paraspinal musculature at the lumbosacral junction measuring at least 6 cm, incompletely characterized. Malignancy cannot be excluded. Further imaging workup would be typically recommended if not previously performed (e.g. thoracic spine MRI with and without contrast and MRA extending to L3).        ASSESSMENT/PLAN:  Ms. Mylene Vasquez is a 67-year-old, right-hand-dominant, female.  She has a left lumbosacral radiculopathy (L5, S1).  She does have lumbosacral facet arthropathy and lumbosacral degenerative disc disease.    Ms. Mylene Vasquez was noted to have a mass in the left paraspinal musculature near the thoracolumbar junction.  MRIs of the thoracic and lumbar spine with and without contrast have been performed.  An enhancing mass was noted in the left paraspinal musculature from T10-L2.  Discussed this finding with Ms. and Mr. Vasquez.  Ms. Mylene Vasquez is being referred to interventional radiology for biopsy of this mass.  Ms. Vasquez is to follow-up in this clinic  after the biopsy of her left paraspinal mass.        Total Time on encounter:  64 minutes were spent on one more or more of the following:  discussion with patient, history, exam, coordinating care, treatment goals, record review, documenting clinical information, and/or data review.      Laurent Nielson MD

## 2022-05-09 NOTE — PATIENT INSTRUCTIONS
Enhancing mass with multiple prominent internal flow voids centered in the left posterior paraspinous musculature at the levels of T10-L2. Although nonspecific, given the multiple prominent flow voids in the lesion, this is concerning for a vascular malformation, potentially with arteriovenous shunting component. A highly vascularized neoplasm is also possible.     Please schedule a follow-up appointment 1 week after your biopsy.  You can schedule this at the , or you can call [phone].

## 2022-05-10 ENCOUNTER — MYC MEDICAL ADVICE (OUTPATIENT)
Dept: ORTHOPEDICS | Facility: CLINIC | Age: 67
End: 2022-05-10
Payer: COMMERCIAL

## 2022-05-10 NOTE — PROGRESS NOTES
Outpatient IR Biopsy Referral    Patient is a 68 y/o female with a PMH of depression, anxiety, lumbosacral radiculopathy, callus of foot, degenerative disc disease, facet arthropathy, RAOUL, anxiety, bilateral low back pain x 10 years that is worsening with standing improved with sitting, pain radiates to left lower extremity, paraspinal mass. IR has been asked to biopsy a left paraspinal muscular mass.    MRI 5/6/22 IMPRESSION:  1. Enhancing mass with multiple prominent internal flow voids centered  in the left posterior paraspinous musculature at the levels of T10-L2.  Although nonspecific, given the multiple prominent flow voids in the  lesion, this is concerning for a vascular malformation, potentially  with arteriovenous shunting component. A highly vascularized neoplasm  is also possible. Consider cross-sectional or conventional diagnostic  spinal angiography for further characterization. Neurointerventional  radiology/neuro-endovascular specialty consultation is suggested.  2. Mild multilevel degenerative changes in the thoracic spine, as  described. No high-grade spinal canal or neural foraminal stenosis.  3. Exaggerated thoracic kyphosis    Case and imaging MRI 5/6/22 was reviewed with Dr. King from IR and biopsy of the T 10 L2 para spinal mass is deferred and recommends IR clinic consultation for vascular malformation. Dr. King reaching out to Dr. Nielson directly to discuss recommendations.     Primary team Dr. Nielson [facility] made aware of IR recommendations of deferring biopsy via epic messaging.     FRANKLIN Kahn CNP  Interventional Radiology   IR on-call pager: [phone]

## 2022-05-11 NOTE — TELEPHONE ENCOUNTER
I called and spoke with Ms. Mylene Vasquez.  I explained that her left thoracolumbar paraspinal mass is thought most likely to be consistent with an arteriovenous malformation.  Ms. Vasquez will be set up with an arteriovenous malformation specialist in radiology.  Ms. Mylene Vasquez is also interested in pursuing treatment for her low back pain with radiation to the left lower extremity.  I will need to coordinate this with radiology to see if it is acceptable to do an injection prior to further work-up for Ms. Vasquez's left paraspinal mass.    Laurent Nielson MD        To: [facility]      From: Mylene Vasquez      Created: 5/10/2022 5:27 PM        *-*-*This message has not been handled.*-*-*    Dr Nielson  We have not heard anything about setting up an appointment for Mylene's biopsy on her back.  Do you know when we might expect to hear something?  Is there anything else we should be doing?  Thanks for your help,  Jayden and Mylenejoaquin Vasquez

## 2022-05-20 ENCOUNTER — TELEPHONE (OUTPATIENT)
Dept: RADIOLOGY | Facility: CLINIC | Age: 67
End: 2022-05-20
Payer: COMMERCIAL

## 2022-05-20 DIAGNOSIS — R22.2 PARASPINAL MASS: Primary | ICD-10-CM

## 2022-05-20 NOTE — TELEPHONE ENCOUNTER
I called and spoke with Mylene.  Dr Alcantar would like new MRI's and MRA of spinal mass.  I have given Mylene number for scheduling per her preference.  She is being scheduled for clinic video consult on 6/1/22 @ 1130, and MRI's will be reviewed.  KAMILLE Valencia RN, BSN  Interventional Radiology Nurse Coordinator   Phone:  [phone]

## 2022-05-26 ENCOUNTER — HOSPITAL ENCOUNTER (OUTPATIENT)
Dept: MRI IMAGING | Facility: CLINIC | Age: 67
Discharge: HOME | End: 2022-05-26
Attending: RADIOLOGY
Payer: COMMERCIAL

## 2022-05-26 DIAGNOSIS — R22.2 PARASPINAL MASS: ICD-10-CM

## 2022-05-26 PROCEDURE — 72159 MR ANGIO SPINE W/O&W/DYE: CPT | Mod: 26 | Performed by: STUDENT IN AN ORGANIZED HEALTH CARE EDUCATION/TRAINING PROGRAM

## 2022-05-26 PROCEDURE — 72159 MR ANGIO SPINE W/O&W/DYE: CPT

## 2022-05-27 ENCOUNTER — TELEPHONE (OUTPATIENT)
Dept: RADIOLOGY | Facility: CLINIC | Age: 67
End: 2022-05-27
Payer: COMMERCIAL

## 2022-05-27 ENCOUNTER — TELEPHONE (OUTPATIENT)
Dept: RADIOLOGY | Facility: CLINIC | Age: 67
End: 2022-05-27

## 2022-05-27 NOTE — TELEPHONE ENCOUNTER
[facility]    Phone Message    May a detailed message be left on voicemail: yes     Reason for Call: Requesting Results   Name/type of test: MRI  Date of test: 5.26.22  Was test done at a location other than [facility] (Please fill in the location if not [facility])?: No    The pt fears cancer and would like the results as soon as possible. Thanks.      Action Taken: Message routed to:  [facility] ([facility] IR    Travel Screening: Not Applicable

## 2022-05-27 NOTE — TELEPHONE ENCOUNTER
I called and spoke with Mylene about her MRI after discussing with Dr Alcantar.  Pt scheduled 6/1 for video consult with Dr Alcantar.  KAMILLE Valencia, RN, BSN  Interventional Radiology Nurse Coordinator   Phone:  [phone]

## 2022-06-01 ENCOUNTER — VIRTUAL VISIT (OUTPATIENT)
Dept: DERMATOLOGY | Facility: CLINIC | Age: 67
End: 2022-06-01
Attending: RADIOLOGY
Payer: COMMERCIAL

## 2022-06-01 DIAGNOSIS — R22.2 PARASPINAL MASS: Primary | ICD-10-CM

## 2022-06-01 PROCEDURE — G0463 HOSPITAL OUTPT CLINIC VISIT: HCPCS | Mod: PN,RTG | Performed by: RADIOLOGY

## 2022-06-01 PROCEDURE — 999N000103 HC STATISTIC NO CHARGE FACILITY FEE: Mod: GT

## 2022-06-01 PROCEDURE — 99205 OFFICE O/P NEW HI 60 MIN: CPT | Mod: 95 | Performed by: RADIOLOGY

## 2022-06-01 NOTE — PROGRESS NOTES
INTERVENTIONAL RADIOLOGY CONSULTATION    Name: Mylene Vasquez  Age: 67 year old   Referring Physician: Dr. Ashton   REASON FOR REFERRAL: vascular lesion     HPI: Mrs. Vasquez is referred to discuss diagnostic and treatment options for a left paraspinal vascular lesion.  She has had a long standing issue with back pain for over 10 years.  The pain was gradual in onset. The pain is daily, achy, can be severe (9/10) with prolonged standing and activity.  Pain is noted in the lumbar and lower thoracic region.  She noted 3 months ago, that she developed pain that radiated down the entire left leg (worse in thigh and butt, but down to foot) when she lays down. That radiating resolves immediately when she stands up.    She takes gabapentin (rx by Dr. Mayen)  for pain, which helps initially, but wears off.  She does not take it on a scheduled basis, but seems to take in general 1 pill/day.  It does however improve her sleep.     No palpable lump or skin discoloration.     No family history of vascular malformation. No lesions elsewhere or vascular stains.    She does exercise (pool, walking) without fatigue.    She has a chronic dry throat and cough she attributes to her antidepressant.      No weight loss, fever, dyspnea, chest pain, abdominal pain, peripheral edema/swelling.    Surgery as a teenage for a bladder with no issues with anesthesia.    PAST MEDICAL HISTORY:   Past Medical History:   Diagnosis Date     Anxiety      Chronic midline low back pain with right-sided sciatica      Depressive disorder        PAST SURGICAL HISTORY:   Past Surgical History:   Procedure Laterality Date     COLONOSCOPY       COLONOSCOPY N/A 11/26/2018    Procedure: COLONOSCOPY ;  Surgeon: Ilsa Aguirre MD;  Location:  GI     URETHROPLASTY  1968       FAMILY HISTORY:   Family History   Problem Relation Age of Onset     Alcohol/Drug Father      Cancer Father         lung cancer     Lung Cancer Father      Osteoporosis Mother       Colon Cancer No family hx of        SOCIAL HISTORY:   Social History     Tobacco Use     Smoking status: Never Smoker     Smokeless tobacco: Never Used   Substance Use Topics     Alcohol use: Not Currently     Comment: very little       PROBLEM LIST:   Patient Active Problem List    Diagnosis Date Noted     Paraspinal mass 04/25/2022     Priority: Medium     Facet arthropathy, lumbosacral 04/08/2022     Priority: Medium     Lumbosacral radiculopathy 04/08/2022     Priority: Medium     Anxiety      Priority: Medium     Callus of foot 11/08/2015     Priority: Medium     DDD (degenerative disc disease), lumbosacral 11/08/2015     Priority: Medium     ACP (advance care planning) 03/26/2014     Priority: Medium     Advance Care Planning:   ACP Review and Resources Provided:  Reviewed chart for advance care plan.  Mylene Vasquez has no plan or code status on file. Discussed available resources and provided with information. Confirmed code status reflects current choices pending further ACP discussions.  Confirmed/documented designated decision maker(s). See permanent comments section of demographics in clinical tab.   Added by Vibha Mcdowell on 3/26/2014        Diagnosis updated by automated process. Provider to review and confirm.       Health Care Home 03/26/2014     Priority: Medium     State Tier Level:  Tier 0  Status:  NA  Care Coordinator:      See Letters for [facility] Care Plan  Date:  August 12, 2014           Generalized anxiety disorder 03/26/2014     Priority: Medium     Diagnosis updated by automated process. Provider to review and confirm.         MEDICATIONS:   Prescription Medications as of 6/1/2022       Rx Number Disp Refills Start End Last Dispensed Date Next Fill Date Owning Pharmacy    clomiPRAMINE (ANAFRANIL) 50 MG capsule    11/20/2019    [facility] - USE FOR MAILING ONLY - [place]    Sig: Take three tablets at bedtime    Class: Historical    gabapentin (NEURONTIN) 300 MG capsule  90  capsule 4 12/8/2020    [facility] - [address]    Sig: Take one tab at night for 3 days.  If able to tolerate, take one tab twice daily for 3 days, then take one tab three times daily.    Class: E-Prescribe    PARoxetine (PAXIL) 30 MG tablet            Sig: Take by mouth every morning    Class: Historical    Route: Oral    VITAMIN D PO        [facility] - [address]    Sig: Take by mouth every 7 days    Class: Historical    Route: Oral          ALLERGIES:   Pcn [penicillins]    ROS:  As above    Physical Examination:   VITALS:   LMP 03/26/2007   GENERAL: Healthy, alert and no distress  SKIN: Visible skin clear. No significant rash, abnormal pigmentation or lesions.  PSYCH: Mentation appears normal, affect normal/bright, judgement and insight intact, normal speech and appearance well-groomed.  EYES: Eyes grossly normal to inspection. No discharge or erythema, or obvious scleral/conjunctival abnormalities.  RESP: No audible wheeze, cough, or visible cyanosis. No visible retractions or increased work of breathing.   NEURO: Cranial nerves grossly intact. Mentation and speech appropriate for age.      Labs:    BMP RESULTS:  Lab Results   Component Value Date     04/15/2021    POTASSIUM 4.2 04/15/2021    CHLORIDE 101 04/15/2021    CO2 28 04/15/2021    ANIONGAP 5 04/15/2021    GLC 99 04/15/2021    BUN 14 04/15/2021    CR 0.72 04/15/2021    GFRESTIMATED 88 04/15/2021    GFRESTBLACK >90 04/15/2021    TOBIAS 8.8 04/15/2021        CBC RESULTS:  Lab Results   Component Value Date    WBC 5.6 04/15/2021    RBC 4.86 04/15/2021    HGB 13.9 04/15/2021    HCT 42.3 04/15/2021    MCV 87 04/15/2021    MCH 28.6 04/15/2021    MCHC 32.9 04/15/2021    RDW 13.3 04/15/2021     04/15/2021       INR/PTT:  No results found for: INR, PTT    Diagnostic studies:   Low thoracic upper lumbar spine MRI with time resolved MRA 5/27/2022 reviewed by  me.  It demonstrates a hypervascular lesion centered in the left paraspinous musculature lower thoracic upper lumbar spine.  There is either fat content within the lesion versus fatty atrophy in the muscle due to lesion presents.  There is early arterial enhancement of the lesion with notable flow voids, and at least 2 intercostal to lumbar artery supplying the lesion.  Gradual and sustained enhancement is noted within the area.    Radiologist Impression:  Impression:  Arterially high flow left paraspinal intramuscular mass with major  arterial supply via left posterior left T10 and left T11 intercostal  arteries and venous drainage via left paraspinous veins through to  azygous vein. 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 Unlikely to represent hypervascular  malignancy, although difficult to entirely exclude.     I have personally reviewed the examination and initial interpretation  and I agree with the findings.     LISSA TRAN MD     Assessment: 67-year-old female with chronic back pain who was found to have an arterial enhancing lesion in her left paraspinous musculature in the setting of ongoing back pain. The appearance favors a high flow vascular malformation, although other neoplasm is less likely.  I discussed that this lesion may contribute to part, but not all of her back pain symptoms.  I discussed the nature of vascular malformations, which can cause focal symptoms, and can enlarge over time.  High flow vascular malformations with AV shunting in particular can place patients at risk for high-output cardiac failure due to significant left to right shunting.   I did let her know that the lesion per imaging is more likely to represent a vascular malformation and less likely a malignancy, although ultimately biopsy could verify this.  I discussed that given the high flow nature of the lesion, embolization would likely first be necessary, with additional  percutaneous sclerotherapy added as needed.  I discussed the process of embolization and the risks of access site bleeding, arterial injury, nontarget embolization including to spinal artery.  I discussed that sclerotherapy carries similar risks.Ideally, treatment would be completed prior to biopsy in order to be devascularize the lesion to reduce any bleeding.  I discussed that the lesion certainly could be genetic tested to understand the exact cell path abnormality, although I do not think this is necessary at this time and could be considered in the future if needed.  I discussed that our neuro interventional colleagues will very possibly be involved in her care and the case was discussed with Dr. Lawrence.    Plan:  1. Angiogram with embolization and sclerotherapy with me and Dr. Lawrence.  2. Biopsy at time of final embolization/sclerotheraoy to confirm diagnosis.  3. Echocardiogram to assess for any signs of heart failure in the settin gof significnat right to left shunt.  4. Brain MRI will be considered to assess for AVM.    It was a pleasure to conduct this video visit with Mrs. Vasquez and her  today.  Thank you for involving the interventional radiology service in her care.    I spent a total of 27 minutes face-to-face time on today's video visit, over 50% time was for counseling and care coordination.  In addition I spent 10 minutes reviewing imaging, 10 minutes completing documentation, and 15minutes subspecialty consultation with Dr. Yariel Chavez.    Afia Alcantar MD  Interventional Radiology   Pager [phone]     Review of the result(s) of each unique test - MRI      Video-Visit Details     Type of service:  Video Visit    Video Start Time:11:58 AM     Video End Time:12:31 PM    Originating Location (pt. Location): Home     Distant Location (provider location):  [facility] [place]      Platform used for Video Visit: Cynthia CARREON  Patient Care Team:  Daisha  Arely Pereyra PA-C as PCP - General (Physician Assistant)  Sherri Mir CNP as Assigned PCP  Laurent Nielson MD as Assigned Neuroscience Provider  Afia Alcantar MD as MD (Vascular and Interventional Radiology)  Shayy Valencia, JENNIFER as Specialty Care Coordinator (Vascular and Interventional Radiology)  ARELY FRAZIER

## 2022-06-01 NOTE — LETTER
6/1/2022      RE: Mylene Vasquez  [address] [place] [address]     Dear Colleague,    Thank you for the opportunity to participate in the care of your patient, Mylene Vasquez, at the [facility] at [facility]. Please see a copy of my visit note below.        INTERVENTIONAL RADIOLOGY CONSULTATION    Name: Mylene Vasquez  Age: 67 year old   Referring Physician: Dr. Ashton   REASON FOR REFERRAL: vascular lesion     HPI: Mrs. Vasquez is referred to discuss diagnostic and treatment options for a left paraspinal vascular lesion.  She has had a long standing issue with back pain for over 10 years.  The pain was gradual in onset. The pain is daily, achy, can be severe (9/10) with prolonged standing and activity.  Pain is noted in the lumbar and lower thoracic region.  She noted 3 months ago, that she developed pain that radiated down the entire left leg (worse in thigh and butt, but down to foot) when she lays down. That radiating resolves immediately when she stands up.    She takes gabapentin (rx by Dr. Mayen)  for pain, which helps initially, but wears off.  She does not take it on a scheduled basis, but seems to take in general 1 pill/day.  It does however improve her sleep.     No palpable lump or skin discoloration.     No family history of vascular malformation. No lesions elsewhere or vascular stains.    She does exercise (pool, walking) without fatigue.    She has a chronic dry throat and cough she attributes to her antidepressant.      No weight loss, fever, dyspnea, chest pain, abdominal pain, peripheral edema/swelling.    Surgery as a teenage for a bladder with no issues with anesthesia.    PAST MEDICAL HISTORY:   Past Medical History:   Diagnosis Date     Anxiety      Chronic midline low back pain with right-sided sciatica      Depressive disorder        PAST SURGICAL HISTORY:   Past Surgical History:   Procedure  Laterality Date     COLONOSCOPY       COLONOSCOPY N/A 11/26/2018    Procedure: COLONOSCOPY ;  Surgeon: Ilsa Aguirre MD;  Location: [facility]     URETHROPLASTY  1968       FAMILY HISTORY:   Family History   Problem Relation Age of Onset     Alcohol/Drug Father      Cancer Father         lung cancer     Lung Cancer Father      Osteoporosis Mother      Colon Cancer No family hx of        SOCIAL HISTORY:   Social History     Tobacco Use     Smoking status: Never Smoker     Smokeless tobacco: Never Used   Substance Use Topics     Alcohol use: Not Currently     Comment: very little       PROBLEM LIST:   Patient Active Problem List    Diagnosis Date Noted     Paraspinal mass 04/25/2022     Priority: Medium     Facet arthropathy, lumbosacral 04/08/2022     Priority: Medium     Lumbosacral radiculopathy 04/08/2022     Priority: Medium     Anxiety      Priority: Medium     Callus of foot 11/08/2015     Priority: Medium     DDD (degenerative disc disease), lumbosacral 11/08/2015     Priority: Medium     ACP (advance care planning) 03/26/2014     Priority: Medium     Advance Care Planning:   ACP Review and Resources Provided:  Reviewed chart for advance care plan.  Mylene DONALD Vasquez has no plan or code status on file. Discussed available resources and provided with information. Confirmed code status reflects current choices pending further ACP discussions.  Confirmed/documented designated decision maker(s). See permanent comments section of demographics in clinical tab.   Added by Vibha Mcdowell on 3/26/2014        Diagnosis updated by automated process. Provider to review and confirm.       Health Care Home 03/26/2014     Priority: Medium     State Tier Level:  Tier 0  Status:  NA  Care Coordinator:      See Letters for [facility] Care Plan  Date:  August 12, 2014           Generalized anxiety disorder 03/26/2014     Priority: Medium     Diagnosis updated by automated process. Provider to review and confirm.         MEDICATIONS:    Prescription Medications as of 6/1/2022       Rx Number Disp Refills Start End Last Dispensed Date Next Fill Date Owning Pharmacy    clomiPRAMINE (ANAFRANIL) 50 MG capsule    11/20/2019    [facility] - USE FOR MAIL[facility] ONLY - [place]    Sig: Take three tablets at bedtime    Class: Historical    gabapentin (NEURONTIN) 300 MG capsule  90 capsule 4 12/8/2020    [facility] - [address]    Sig: Take one tab at night for 3 days.  If able to tolerate, take one tab twice daily for 3 days, then take one tab three times daily.    Class: E-Prescribe    PARoxetine (PAXIL) 30 MG tablet            Sig: Take by mouth every morning    Class: Historical    Route: Oral    VITAMIN D PO        [facility] - [address]    Sig: Take by mouth every 7 days    Class: Historical    Route: Oral          ALLERGIES:   Pcn [penicillins]    ROS:  As above    Physical Examination:   VITALS:   LMP 03/26/2007   GENERAL: Healthy, alert and no distress  SKIN: Visible skin clear. No significant rash, abnormal pigmentation or lesions.  PSYCH: Mentation appears normal, affect normal/bright, judgement and insight intact, normal speech and appearance well-groomed.  EYES: Eyes grossly normal to inspection. No discharge or erythema, or obvious scleral/conjunctival abnormalities.  RESP: No audible wheeze, cough, or visible cyanosis. No visible retractions or increased work of breathing.   NEURO: Cranial nerves grossly intact. Mentation and speech appropriate for age.      Labs:    BMP RESULTS:  Lab Results   Component Value Date     04/15/2021    POTASSIUM 4.2 04/15/2021    CHLORIDE 101 04/15/2021    CO2 28 04/15/2021    ANIONGAP 5 04/15/2021    GLC 99 04/15/2021    BUN 14 04/15/2021    CR 0.72 04/15/2021    GFRESTIMATED 88 04/15/2021    GFRESTBLACK >90 04/15/2021    TOBIAS 8.8 04/15/2021        CBC RESULTS:  Lab Results   Component Value Date    WBC  5.6 04/15/2021    RBC 4.86 04/15/2021    HGB 13.9 04/15/2021    HCT 42.3 04/15/2021    MCV 87 04/15/2021    MCH 28.6 04/15/2021    MCHC 32.9 04/15/2021    RDW 13.3 04/15/2021     04/15/2021       INR/PTT:  No results found for: INR, PTT    Diagnostic studies:   Low thoracic upper lumbar spine MRI with time resolved MRA 5/27/2022 reviewed by me.  It demonstrates a hypervascular lesion centered in the left paraspinous musculature lower thoracic upper lumbar spine.  There is either fat content within the lesion versus fatty atrophy in the muscle due to lesion presents.  There is early arterial enhancement of the lesion with notable flow voids, and at least 2 intercostal to lumbar artery supplying the lesion.  Gradual and sustained enhancement is noted within the area.    Radiologist Impression:  Impression:  Arterially high flow left paraspinal intramuscular mass with major  arterial supply via left posterior left T10 and left T11 intercostal  arteries and venous drainage via left paraspinous veins through to  azygous vein. 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 Unlikely to represent hypervascular  malignancy, although difficult to entirely exclude.     I have personally reviewed the examination and initial interpretation  and I agree with the findings.     LISSA TRAN MD     Assessment: 67-year-old female with chronic back pain who was found to have an arterial enhancing lesion in her left paraspinous musculature in the setting of ongoing back pain. The appearance favors a high flow vascular malformation, although other neoplasm is less likely.  I discussed that this lesion may contribute to part, but not all of her back pain symptoms.  I discussed the nature of vascular malformations, which can cause focal symptoms, and can enlarge over time.  High flow vascular malformations with AV shunting in particular can place patients at risk for high-output cardiac  failure due to significant left to right shunting.   I did let her know that the lesion per imaging is more likely to represent a vascular malformation and less likely a malignancy, although ultimately biopsy could verify this.  I discussed that given the high flow nature of the lesion, embolization would likely first be necessary, with additional percutaneous sclerotherapy added as needed.  I discussed the process of embolization and the risks of access site bleeding, arterial injury, nontarget embolization including to spinal artery.  I discussed that sclerotherapy carries similar risks.Ideally, treatment would be completed prior to biopsy in order to be devascularize the lesion to reduce any bleeding.  I discussed that the lesion certainly could be genetic tested to understand the exact cell path abnormality, although I do not think this is necessary at this time and could be considered in the future if needed.  I discussed that our neuro interventional colleagues will very possibly be involved in her care and the case was discussed with Dr. Lawrence.    Plan:  1. Angiogram with embolization and sclerotherapy with me and Dr. Lawrence.  2. Biopsy at time of final embolization/sclerotheraoy to confirm diagnosis.  3. Echocardiogram to assess for any signs of heart failure in the settin gof significnat right to left shunt.  4. Brain MRI will be considered to assess for AVM.    It was a pleasure to conduct this video visit with Mrs. Vasquez and her  today.  Thank you for involving the interventional radiology service in her care.    I spent a total of 27 minutes face-to-face time on today's video visit, over 50% time was for counseling and care coordination.  In addition I spent 10 minutes reviewing imaging, 10 minutes completing documentation, and 15minutes subspecialty consultation with Dr. Yariel Chavez.    Afia Alcantar MD  Interventional Radiology   Pager [phone]     Review of the result(s) of  each unique test - MRI    CC  Patient Care Team:  Arely Ashton PA-C as PCP - General (Physician Assistant)  Sherri Mir CNP as Assigned PCP  Laurent Nielson MD as Assigned Neuroscience Provider  Shayy Valencia RN as Specialty Care Coordinator (Vascular and Interventional Radiology)

## 2022-06-01 NOTE — NURSING NOTE
"[facility] [[id number]]  Chief Complaint   Patient presents with     Consult     Add on consult     Initial LMP 03/26/2007  Estimated body mass index is 27.25 kg/m  as calculated from the following:    Height as of 5/9/22: 5' 2\" (157.5 cm).    Weight as of 5/9/22: 149 lb (67.6 kg).  Medication Reconciliation: complete     Rick Escobedo, EMT        "

## 2022-06-05 ENCOUNTER — HEALTH MAINTENANCE LETTER (OUTPATIENT)
Age: 67
End: 2022-06-05

## 2022-06-13 DIAGNOSIS — I77.89 OTHER SPECIFIED DISORDERS OF ARTERIES AND ARTERIOLES (H): ICD-10-CM

## 2022-06-13 DIAGNOSIS — Q27.9 VASCULAR MALFORMATION: Primary | ICD-10-CM

## 2022-06-20 DIAGNOSIS — R22.2 PARASPINAL MASS: Primary | ICD-10-CM

## 2022-06-20 DIAGNOSIS — I99.8 OTHER DISORDER OF CIRCULATORY SYSTEM: ICD-10-CM

## 2022-06-20 NOTE — PROGRESS NOTES
Per Dr. Alcantar 6/1/22: Angiogram with embolization and sclerotherapy with me and/or Dr. Lawrence.

## 2022-06-21 ENCOUNTER — HOSPITAL ENCOUNTER (INPATIENT)
Facility: CLINIC | Age: 67
Setting detail: SURGERY ADMIT
End: 2022-06-21
Payer: COMMERCIAL

## 2022-06-28 ENCOUNTER — TELEPHONE (OUTPATIENT)
Dept: RADIOLOGY | Facility: CLINIC | Age: 67
End: 2022-06-28

## 2022-06-28 ENCOUNTER — HOSPITAL ENCOUNTER (INPATIENT)
Facility: CLINIC | Age: 67
Setting detail: SURGERY ADMIT
End: 2022-06-28
Payer: COMMERCIAL

## 2022-06-28 NOTE — TELEPHONE ENCOUNTER
I called patient in regards to IR procedure with Dr. Lawrence    Procedure Date: 8/5/2022    Arrival: 1000am    Surgeon: Dr. Lawrence    Pre Op: 7/20/2022 PAC virtual     Covid: Shruti IRAHETA    Post Op: TBD     Notes: procedure at [facility]    I called patient, but was only able to leave a voicemail.        Spencer Hsieh on 6/28/2022 at 11:51 AM

## 2022-06-29 NOTE — TELEPHONE ENCOUNTER
I called and spoke with Mylene.  She is now updated on the need for Echo and MRI brain ordered by Dr Alcantar, as well as upcoming procedure with Dr Lawrence as discussed at 6/1 consult with Dr Alcantar.  All questions answered.  KAMILLE Valencia, RN, BSN  Interventional Radiology Nurse Coordinator   Phone:  [phone]

## 2022-06-29 NOTE — TELEPHONE ENCOUNTER
FUTURE VISIT INFORMATION      SURGERY INFORMATION:    Date: 8/3/22    Location: [facility] OR    Surgeon:  GENERIC ANESTHESIA PROVIDER    Anesthesia Type:  General    Procedure: Bilateral Carotid Cerebral Angiogram With Embolization    RECORDS REQUESTED FROM:        Primary Care Provider: Venus Huerta MD  - [facility]    Most recent EKG+ Tracin20    Most recent ECHO: 22

## 2022-07-11 ENCOUNTER — MYC MEDICAL ADVICE (OUTPATIENT)
Dept: NEUROLOGY | Facility: CLINIC | Age: 67
End: 2022-07-11

## 2022-07-11 ENCOUNTER — PATIENT OUTREACH (OUTPATIENT)
Dept: NEUROLOGY | Facility: CLINIC | Age: 67
End: 2022-07-11

## 2022-07-11 NOTE — PROGRESS NOTES
KIRANMM informing patient instructions sent via [employer]. Encouraged to return call. Tania Farias RN 7/11/2022 3:38 PM

## 2022-07-11 NOTE — TELEPHONE ENCOUNTER
[facility] Call Center    Phone Message    May a detailed message be left on voicemail: yes     Reason for Call: Other: patient would like a call back to discuss procedure. She was not aware she would have to stay the night. Please contact patient.       Action Taken: Message routed to:  [facility] neurosurgery    Travel Screening: Not Applicable

## 2022-07-11 NOTE — PATIENT INSTRUCTIONS
You are scheduled for embolization with Dr. Lawrence on 8/3/22 at 11:30 AM.    Please follow these instructions:    * Prior to your procedure you will need to obtain COVID-19 testing and blood work within 2-4 days of your scheduled procedure. These appointments have been scheduled for you at [facility], located at [address] [place] [address]. Their phone number is [phone].  Appointments: COVID Test 8/1/22 at 1:00 PM, Blood Work 8/1/22 at 12:45 PM    * You will need a pre-op physical within 30 days prior to your procedure. You may do this with your primary care provider or with the Pre-Operative Assessment Center (PAC), located at [facility] at [address] [place] [id number]. The PAC phone number is [phone].    * You should arrive at the [facility] Lobby at [facility] at 9:30 AM. The address is [address] [place] [address] [place] [address]. Enter at door 2, closest to [facility]. The phone number is [phone].     * Do not eat after Midnight; you may drink clear liquids (includes water, Jell-O, clear broth, apple juice or any non-carbonated beverage that you can see through) until 9:30 AM.     * You may take your medications with a sip of water the morning of the procedure.     * You will stay overnight at the hospital for observation after the procedure. We aim to discharge you by 11:00 AM the following day. Please have your ride available by 10:00 AM.     PLEASE NOTE our COVID-19 visitors policy: For the protection of our patients and visitors, patients are allowed one consistent visitor, 18 years of age or older, per adult patient in the hospital. Visitors must wear a mask at all times while in the hospital.     All discharge instructions will be given to the  or volunteer. Documentation for the post-operative plan will be given to the patient and . Patients are required to have  someone to stay with them for 24 hours after their procedure.    If you have questions regarding your procedure, please contact me at [phone], option 3.    If you need to cancel, reschedule or have procedure scheduling related questions, please call Jacquelyn at [phone].    Thank you,  Tania Farias RN, CNRN, SCRN  Stroke & Neuroendovascular Care Coordinator

## 2022-07-11 NOTE — TELEPHONE ENCOUNTER
Attn: Tania Farias, RNCC   *Patient with questions on surgical procedure post op.     Susannah Arizmendi LPN  Neurosurgery

## 2022-07-12 NOTE — TELEPHONE ENCOUNTER
[facility] Call [facility]    Phone Message    May a detailed message be left on voicemail: yes     Reason for Call: Other: Patient calling in to speak with Tania to discuss procedure. Patient would like a call back to discuss.       Please contact patient at [phone] or husbands cell [phone] to discuss    Action Taken: Message routed to:  [facility] ([facility]): Neurology    Travel Screening: Not Applicable

## 2022-07-13 NOTE — TELEPHONE ENCOUNTER
Spoke with patient. Informed of procedure instructions. Answered questions. Patient verbalized understanding. Tania Farias RN 7/13/2022 10:13 AM

## 2022-07-20 ENCOUNTER — ANESTHESIA EVENT (OUTPATIENT)
Dept: INTERVENTIONAL RADIOLOGY/VASCULAR | Facility: CLINIC | Age: 67
End: 2022-07-20

## 2022-07-20 ENCOUNTER — VIRTUAL VISIT (OUTPATIENT)
Dept: SURGERY | Facility: CLINIC | Age: 67
End: 2022-07-20
Payer: COMMERCIAL

## 2022-07-20 ENCOUNTER — PRE VISIT (OUTPATIENT)
Dept: SURGERY | Facility: CLINIC | Age: 67
End: 2022-07-20

## 2022-07-20 DIAGNOSIS — Z01.818 PREOP EXAMINATION: Primary | ICD-10-CM

## 2022-07-20 PROCEDURE — 99203 OFFICE O/P NEW LOW 30 MIN: CPT | Mod: 95 | Performed by: PHYSICIAN ASSISTANT

## 2022-07-20 ASSESSMENT — ENCOUNTER SYMPTOMS: SEIZURES: 0

## 2022-07-20 ASSESSMENT — LIFESTYLE VARIABLES: TOBACCO_USE: 0

## 2022-07-20 ASSESSMENT — PAIN SCALES - GENERAL: PAINLEVEL: NO PAIN (0)

## 2022-07-20 NOTE — PROGRESS NOTES
Mylene is a 67 year old who is being evaluated via a billable video visit.      How would you like to obtain your AVS? MyChart  If the video visit is dropped, the invitation should be resent by: Text to cell phone: [phone]     HPI       Review of Systems         Objective    Vitals - Patient Reported  Pain Score: No Pain (0)        Physical Exam     .  ..

## 2022-07-20 NOTE — H&P
Pre-Operative H & P     CC:  Preoperative exam to assess for increased cardiopulmonary risk while undergoing surgery and anesthesia.    Date of Encounter: 7/20/2022  Primary Care Physician:  Arely Ashton     Reason for Visit: Paraspinal mass    HPI     Mylene Vasquez is a 68 y/o female who presents for pre-operative H&P in preparation for Bilateral Carotid Cerebral Angiogram With Embolization on 8/3/22 at [facility] for treatment of a Paraspinal mass.     Patient is being evaluated for comorbid conditions of anxiety, depression, right sciatica, claustrophobia.    Ms. 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 The appearance favors a high flow vascular malformation, although other neoplasm is less likely. She now presents for the above procedure.     History was obtained from patient & chart review. She is accompanied by her .       Hx of abnormal bleeding or anti-platelet use: denies    Menstrual history: Patient's last menstrual period was 03/26/2007.:       Past Medical History  Past Medical History:   Diagnosis Date     Anxiety      Chronic midline low back pain with right-sided sciatica      Depressive disorder      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       Past Surgical History  Past Surgical History:   Procedure Laterality Date     COLONOSCOPY       COLONOSCOPY N/A 11/26/2018    Procedure: COLONOSCOPY ;  Surgeon: Ilsa Aguirre MD;  Location: [facility]     URETHROPLASTY  1967       Prior to Admission Medications  Current Outpatient Medications   Medication Sig Dispense Refill     clomiPRAMINE (ANAFRANIL) 50 MG capsule every evening Take three tablets at bedtime       gabapentin (NEURONTIN) 300 MG capsule Take one tab at night for 3 days.  If able to tolerate, take one tab twice daily for 3 days, then take one tab three times daily. (Patient taking differently: as needed Take one tab at night for 3  days.  If able to tolerate, take one tab twice daily for 3 days, then take one tab three times daily.) 90 capsule 4     PARoxetine (PAXIL) 30 MG tablet Take by mouth every evening       VITAMIN D PO Take by mouth every 7 days (Patient not taking: Reported on 6/1/2022)         Allergies  Allergies   Allergen Reactions     Pcn [Penicillins]      itchy       Social History  Social History     Socioeconomic History     Marital status:      Spouse name: Not on file     Number of children: Not on file     Years of education: Not on file     Highest education level: Not on file   Occupational History     Not on file   Tobacco Use     Smoking status: Never Smoker     Smokeless tobacco: Never Used   Substance and Sexual Activity     Alcohol use: Not Currently     Comment: very little     Drug use: Never     Sexual activity: Yes     Partners: Male   Other Topics Concern     Parent/sibling w/ CABG, MI or angioplasty before 65F 55M? Not Asked   Social History Narrative     Not on file     Social Determinants of Health     Financial Resource Strain: Not on file   Food Insecurity: Not on file   Transportation Needs: Not on file   Physical Activity: Not on file   Stress: Not on file   Social Connections: Not on file   Intimate Partner Violence: Not on file   Housing Stability: Not on file       Family History  Family History   Problem Relation Age of Onset     Osteoporosis Mother      Alcohol/Drug Father      Cancer Father         lung cancer     Lung Cancer Father      Colon Cancer No family hx of      Anesthesia Reaction No family hx of      Deep Vein Thrombosis (DVT) No family hx of      Cardiovascular No family hx of        Review of Systems  The complete review of systems is negative other than noted in the HPI or here.     Anesthesia Evaluation   Pt has had prior anesthetic.     No history of anesthetic complications       ROS/MED HX  ENT/Pulmonary:  - neg pulmonary ROS  (-) tobacco use, asthma and sleep apnea    Neurologic: Comment: Paraspinal mass    (+) no peripheral neuropathy  (-) no seizures and no CVA   Cardiovascular:       METS/Exercise Tolerance: 4 - Raking leaves, gardening    Hematologic:  - neg hematologic  ROS  (-) history of blood clots and history of blood transfusion   Musculoskeletal: Comment: Chronic feet & back pain        GI/Hepatic:  - neg GI/hepatic ROS  (-) GERD and liver disease   Renal/Genitourinary:  - neg Renal ROS  (-) renal disease   Endo:  - neg endo ROS  (-) Type II DM   Psychiatric/Substance Use:     (+) psychiatric history anxiety and depression     Infectious Disease:  - neg infectious disease ROS     Malignancy:  - neg malignancy ROS     Other:  - neg other ROS          Virtual visit -  No vitals were obtained    Physical Exam  Constitutional: Awake, alert, cooperative, no apparent distress, and appears stated age.  HENT: Normocephalic  Respiratory: non labored breathing   Neurologic: Awake, alert, oriented to name, place and time.   Neuropsychiatric: Calm, cooperative. Normal affect.      Prior Labs/Diagnostic Studies   All labs and imaging personally reviewed     Thoracic spine MRI without and with contrast  Spinal MR Angiogram with contrast 5/26/22  Impression:  Arterially high flow left paraspinal intramuscular mass with major  arterial supply via left posterior left T10 and left T11 intercostal  arteries and venous drainage via left paraspinous veins through to  azygous vein. 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 Unlikely to represent hypervascular  malignancy, although difficult to entirely exclude.      The patient's records and results personally reviewed by this provider.     Diagnostic studies scheduled on 7/21/22: MRI/MRA Brain and Echocardiogram      Labs to be collected on 8/1/22:  BMP, CBC, INR, PTT, COVID      Assessment      Mylene Vasquez is a 67 year old female seen as a PAC referral for risk assessment and  "optimization for anesthesia.    Plan/Recommendations  Pt will be optimized for the proposed procedure.  See below for details on the assessment, risk, and preoperative recommendations    NEUROLOGY  - No history of TIA, CVA or seizure  - Paraspinal mass (appearance favors a high flow vascular malformation)  -Post Op delirium risk factors:  No risk identified    ENT  - No current airway concerns.  Will need to be reassessed day of surgery.  Mallampati: Unable to assess  TM: > 3    CARDIAC  - No history of CAD, Hypertension and Afib  - METS (Metabolic Equivalents)  Patient performs 4 or more METS exercise without symptoms            Total Score: 0      RCRI-Very low risk: Class 1 0.4% complication rate            Total Score: 0        PULMONARY  JULIAN Low Risk            Total Score: 1    JULIAN: Over 50 ys old      - Denies asthma or inhaler use  - Tobacco History      History   Smoking Status     Never Smoker   Smokeless Tobacco     Never Used       GI  - Denies GERD  PONV Medium Risk  Total Score: 2           1 AN PONV: Pt is Female    1 AN PONV: Patient is not a current smoker          ENDOCRINE    - BMI: Estimated body mass index is 27.25 kg/m  as calculated from the following:    Height as of 5/9/22: 1.575 m (5' 2\").    Weight as of 5/9/22: 67.6 kg (149 lb).  Overweight (BMI 25.0-29.9)  - No history of Diabetes Mellitus    HEME  VTE Low Risk 0.26%            Total Score: 1    VTE: Greater than 59 yrs old      - No history of abnormal bleeding or antiplatelet use.      MSK  Patient is NOT Frail            Total Score: 0      - Chronic back & B/L foot pain    The patient is optimized for their procedure. AVS with information on surgery time/arrival time, meds and NPO status given by nursing staff. No further diagnostic testing indicated.    Please refer to the physical examination documented by the anesthesiologist in the anesthesia record on the day of surgery.    Final plan per anesthesiologist on day of " surgery.    Video-Visit Details    Type of service:  Video Visit    Patient verbally consented to video service today: YES    Video Start Time: 1124  Video End Time (time video stopped): 1144    Originating Location (pt. Location): Home    Distant Location (provider location):  [facility] PREOPERATIVE [facility]     Mode of Communication:  Video Conference via [employer]  On the day of service:     Prep time: 13 minutes  Visit time: 20 minutes  Documentation time: 11 minutes  ------------------------------------------  Total time: 44 minutes      Myra Grigsby PA-C  Preoperative Assessment Center  [facility]  Clinic and Surgery Center  Phone: [phone]  Fax: [phone]

## 2022-07-21 ENCOUNTER — ANCILLARY PROCEDURE (OUTPATIENT)
Dept: MRI IMAGING | Facility: CLINIC | Age: 67
End: 2022-07-21
Attending: RADIOLOGY
Payer: COMMERCIAL

## 2022-07-21 ENCOUNTER — TELEPHONE (OUTPATIENT)
Dept: NEUROLOGY | Facility: CLINIC | Age: 67
End: 2022-07-21

## 2022-07-21 ENCOUNTER — ANCILLARY PROCEDURE (OUTPATIENT)
Dept: CARDIOLOGY | Facility: CLINIC | Age: 67
End: 2022-07-21
Attending: RADIOLOGY
Payer: COMMERCIAL

## 2022-07-21 DIAGNOSIS — Q27.9 VASCULAR MALFORMATION: ICD-10-CM

## 2022-07-21 LAB — LVEF ECHO: NORMAL

## 2022-07-21 PROCEDURE — 93306 TTE W/DOPPLER COMPLETE: CPT | Mod: GC | Performed by: INTERNAL MEDICINE

## 2022-07-21 PROCEDURE — 70551 MRI BRAIN STEM W/O DYE: CPT | Mod: GC | Performed by: RADIOLOGY

## 2022-07-26 ENCOUNTER — LAB REQUISITION (OUTPATIENT)
Dept: LAB | Facility: CLINIC | Age: 67
End: 2022-07-26
Payer: COMMERCIAL

## 2022-07-26 DIAGNOSIS — R35.0 FREQUENCY OF MICTURITION: ICD-10-CM

## 2022-07-26 PROCEDURE — 87086 URINE CULTURE/COLONY COUNT: CPT | Mod: ORL | Performed by: OBSTETRICS & GYNECOLOGY

## 2022-07-28 ENCOUNTER — HOSPITAL ENCOUNTER (EMERGENCY)
Facility: CLINIC | Age: 67
Discharge: HOME | End: 2022-07-28
Attending: EMERGENCY MEDICINE | Admitting: EMERGENCY MEDICINE
Payer: COMMERCIAL

## 2022-07-28 VITALS
HEART RATE: 96 BPM | OXYGEN SATURATION: 98 % | SYSTOLIC BLOOD PRESSURE: 140 MMHG | BODY MASS INDEX: 27.25 KG/M2 | TEMPERATURE: 97.3 F | WEIGHT: 149 LBS | RESPIRATION RATE: 18 BRPM | DIASTOLIC BLOOD PRESSURE: 86 MMHG

## 2022-07-28 DIAGNOSIS — E87.1 HYPONATREMIA: ICD-10-CM

## 2022-07-28 DIAGNOSIS — R11.2 NAUSEA VOMITING AND DIARRHEA: ICD-10-CM

## 2022-07-28 DIAGNOSIS — R19.7 NAUSEA VOMITING AND DIARRHEA: ICD-10-CM

## 2022-07-28 LAB
ANION GAP SERPL CALCULATED.3IONS-SCNC: 10 MMOL/L (ref 7–15)
ANION GAP SERPL CALCULATED.3IONS-SCNC: 11 MMOL/L (ref 7–15)
BACTERIA UR CULT: ABNORMAL
BUN SERPL-MCNC: 7.4 MG/DL (ref 8–23)
BUN SERPL-MCNC: 9 MG/DL (ref 8–23)
CALCIUM SERPL-MCNC: 7.9 MG/DL (ref 8.8–10.2)
CALCIUM SERPL-MCNC: 8.7 MG/DL (ref 8.8–10.2)
CHLORIDE SERPL-SCNC: 81 MMOL/L (ref 98–107)
CHLORIDE SERPL-SCNC: 88 MMOL/L (ref 98–107)
CREAT SERPL-MCNC: 0.53 MG/DL (ref 0.51–0.95)
CREAT SERPL-MCNC: 0.55 MG/DL (ref 0.51–0.95)
DEPRECATED HCO3 PLAS-SCNC: 25 MMOL/L (ref 22–29)
DEPRECATED HCO3 PLAS-SCNC: 26 MMOL/L (ref 22–29)
ERYTHROCYTE [DISTWIDTH] IN BLOOD BY AUTOMATED COUNT: 12 % (ref 10–15)
GFR SERPL CREATININE-BSD FRML MDRD: >90 ML/MIN/1.73M2
GFR SERPL CREATININE-BSD FRML MDRD: >90 ML/MIN/1.73M2
GLUCOSE SERPL-MCNC: 135 MG/DL (ref 70–99)
GLUCOSE SERPL-MCNC: 137 MG/DL (ref 70–99)
HCT VFR BLD AUTO: 38.7 % (ref 35–47)
HGB BLD-MCNC: 12.8 G/DL (ref 11.7–15.7)
HOLD SPECIMEN: NORMAL
HOLD SPECIMEN: NORMAL
MCH RBC QN AUTO: 27.5 PG (ref 26.5–33)
MCHC RBC AUTO-ENTMCNC: 33.1 G/DL (ref 31.5–36.5)
MCV RBC AUTO: 83 FL (ref 78–100)
PLATELET # BLD AUTO: 298 10E3/UL (ref 150–450)
POTASSIUM SERPL-SCNC: 3.7 MMOL/L (ref 3.4–5.3)
POTASSIUM SERPL-SCNC: 3.8 MMOL/L (ref 3.4–5.3)
RBC # BLD AUTO: 4.65 10E6/UL (ref 3.8–5.2)
SODIUM SERPL-SCNC: 118 MMOL/L (ref 136–145)
SODIUM SERPL-SCNC: 123 MMOL/L (ref 136–145)
WBC # BLD AUTO: 5.5 10E3/UL (ref 4–11)

## 2022-07-28 PROCEDURE — 85027 COMPLETE CBC AUTOMATED: CPT | Performed by: EMERGENCY MEDICINE

## 2022-07-28 PROCEDURE — 36415 COLL VENOUS BLD VENIPUNCTURE: CPT | Performed by: EMERGENCY MEDICINE

## 2022-07-28 PROCEDURE — 85014 HEMATOCRIT: CPT | Performed by: EMERGENCY MEDICINE

## 2022-07-28 PROCEDURE — 250N000011 HC RX IP 250 OP 636: Performed by: EMERGENCY MEDICINE

## 2022-07-28 PROCEDURE — 258N000003 HC RX IP 258 OP 636: Performed by: EMERGENCY MEDICINE

## 2022-07-28 PROCEDURE — 99284 EMERGENCY DEPT VISIT MOD MDM: CPT | Mod: 25

## 2022-07-28 PROCEDURE — 96361 HYDRATE IV INFUSION ADD-ON: CPT

## 2022-07-28 PROCEDURE — 96374 THER/PROPH/DIAG INJ IV PUSH: CPT

## 2022-07-28 PROCEDURE — 80048 BASIC METABOLIC PNL TOTAL CA: CPT | Performed by: EMERGENCY MEDICINE

## 2022-07-28 RX ORDER — CIPROFLOXACIN 250 MG/1
250 TABLET, FILM COATED ORAL 2 TIMES DAILY
Qty: 6 TABLET | Refills: 0 | Status: SHIPPED | OUTPATIENT
Start: 2022-07-28 | End: 2022-07-31

## 2022-07-28 RX ORDER — ONDANSETRON 4 MG/1
4 TABLET, ORALLY DISINTEGRATING ORAL EVERY 6 HOURS PRN
Qty: 10 TABLET | Refills: 0 | Status: SHIPPED | OUTPATIENT
Start: 2022-07-28 | End: 2022-07-31

## 2022-07-28 RX ORDER — ONDANSETRON 2 MG/ML
4 INJECTION INTRAMUSCULAR; INTRAVENOUS ONCE
Status: COMPLETED | OUTPATIENT
Start: 2022-07-28 | End: 2022-07-28

## 2022-07-28 RX ADMIN — SODIUM CHLORIDE 1000 ML: 9 INJECTION, SOLUTION INTRAVENOUS at 04:23

## 2022-07-28 RX ADMIN — SODIUM CHLORIDE 1000 ML: 9 INJECTION, SOLUTION INTRAVENOUS at 05:56

## 2022-07-28 RX ADMIN — ONDANSETRON 4 MG: 2 INJECTION INTRAMUSCULAR; INTRAVENOUS at 04:24

## 2022-07-28 ASSESSMENT — ENCOUNTER SYMPTOMS
HEADACHES: 1
DIARRHEA: 1
BLOOD IN STOOL: 0
NAUSEA: 1
DIZZINESS: 1
ABDOMINAL PAIN: 0
VOMITING: 1
FEVER: 0
FLANK PAIN: 0

## 2022-07-28 NOTE — ED PROVIDER NOTES
History   Chief Complaint:  Nausea, Vomiting, & Diarrhea     The history is provided by the patient.      Mylene Vasquez is a 67 year old female with history of anxiety who presents with nausea, vomiting, and diarrhea. The patient reports being started on Bactrim on 07/26 for a bladder infection. She took one dose 2 days ago and immediately developed a headache, but otherwise had no nausea or vomiting. Yesterday, around 1600, she took her second dose of Bactrim and began feeling dizzy, later developing nausea, vomiting, and diarrhea. She has been unable to eat any food without nonbloody emesis and has had continued to have episodes throughout the night. She notes that her diarrhea is completely loose stool with no blood. She denies any fever, flank or abdominal pain. She also denies any rashes, itchiness, difficulty breathing, or throat closure. Her UTI symptoms are slightly improved. Notably, she has never taken Bactrim before as her daughter is allergic to it.     Urine Culture - 07/26/22:   10,000-50,000 CFU/mL Klebsiella oxytoca   Ampicillin Resistant 1     Ampicillin/ Sulbactam Intermediate     Cefazolin Resistant 2     Cefepime Susceptible     Cefoxitin Susceptible     Ceftazidime Susceptible     Ceftriaxone Susceptible     Ciprofloxacin Susceptible     Gentamicin Susceptible     Levofloxacin Susceptible     Nitrofurantoin Intermediate     Piperacillin/Tazobactam Susceptible     Tobramycin Susceptible     Trimethoprim/Sulfamethoxazole Susceptible        Review of Systems   Constitutional: Negative for fever.   HENT:        (-) throat closure   Respiratory:        (-) difficulty breathing   Gastrointestinal: Positive for diarrhea, nausea and vomiting. Negative for abdominal pain and blood in stool.   Genitourinary: Negative for flank pain.   Skin: Negative for rash.        (-) itchiness   Neurological: Positive for dizziness and headaches.   All other systems reviewed and are  negative.    Allergies:  Penicillins    Medications:  Anafranil  Gabapentin  Paxil  Vitamin D    Past Medical History:     Anxiety  DDD  Facet arthropathy  Lumbosacral radiculopathy  Paraspinal mass    Past Surgical History:    Colonoscopy x2  Urethroplasty     Family History:    Osteoporosis  Alcohol/drug use  Lung cancer    Social History:  The patient presents to the ED with her .  The patient arrived to the ED in a private vehicle.  PCP: Arely Ashton PA-C, Family Medicine     Physical Exam     Patient Vitals for the past 24 hrs:   BP Temp Temp src Pulse Resp SpO2 Weight   07/28/22 0704 (!) 140/86 -- -- 96 18 98 % --   07/28/22 0413 (!) 148/100 97.3  F (36.3  C) Temporal 97 18 98 % 67.6 kg (149 lb)     Physical Exam  General: Alert, no acute distress; well appearing  HEENT:  Moist mucous membranes.  Conjunctiva normal.  CV:  RRR, no m/r/g, skin warm and well perfused  Pulm:  CTAB, no wheezes/ronchi/rales.  No acute distress, breathing comfortably  GI:  Soft, nontender, nondistended.  No rebound or guarding.   MSK:  Moving all extremities.  No focal areas of edema, erythema, or tenderness  Skin:  WWP, no rashes, no lower extremity edema, skin color normal, no diaphoresis  Psych:  Well-appearing, normal affect, regular speech    Emergency Department Course     Laboratory:  Labs Ordered and Resulted from Time of ED Arrival to Time of ED Departure   BASIC METABOLIC PANEL - Abnormal       Result Value    Creatinine 0.55      Sodium 118 (*)     Potassium 3.8      Urea Nitrogen 9.0      Chloride 81 (*)     Carbon Dioxide (CO2) 26      Anion Gap 11      Glucose 137 (*)     GFR Estimate >90      Calcium 8.7 (*)    BASIC METABOLIC PANEL - Abnormal    Creatinine 0.53      Sodium 123 (*)     Potassium 3.7      Urea Nitrogen 7.4 (*)     Chloride 88 (*)     Carbon Dioxide (CO2) 25      Anion Gap 10      Glucose 135 (*)     GFR Estimate >90      Calcium 7.9 (*)    CBC WITH PLATELETS - Normal    WBC Count 5.5       RBC Count 4.65      Hemoglobin 12.8      Hematocrit 38.7      MCV 83      MCH 27.5      MCHC 33.1      RDW 12.0      Platelet Count 298        Emergency Department Course:     Reviewed:  I reviewed nursing notes, vitals, past medical history and Care Everywhere    Assessments:  0503 I obtained history and examined the patient as noted above.    I rechecked the patient and explained findings.     Interventions:  0423 NS 1 L IV   0424 Zofran 4 mg IV  0556 NS 1 L IV    Disposition:  The patient was discharged to home.     Impression & Plan     Medical Decision Making:  Mylene Vasquez is a 67 year old female with history of Celia presenting to the ER for evaluation of nausea/vomiting and diarrhea in setting of antibiotic treatment for UTI.  Please see above for details of HPI and exam.  Reports symptom onset after second dose of Bactrim.  No signs or symptoms to suggest type I allergic reaction.  Suspect intolerance to Bactrim as cause for her symptoms.  Abdominal exam is completely benign.  She is afebrile vitally stable.  UTI symptoms have improved.  No signs to suggest intra-abdominal catastrophe requiring advanced imaging.  Doubt bacterial cause for her diarrhea given no fevers or bloody stools.  Lower suspicion at this time for C. difficile.  Basic lab studies above noted with hyponatremia likely from fluid losses from vomiting and diarrhea.  Patient was given a liter saline and repeat BMP shows improved hyponatremia.  Suspect this is from volume losses.  No further acute correction indicated at this time as patient is otherwise asymptomatic and to avoid risk of CPM.  Patient's nausea improved and was able to tolerate crackers and soda at bedside.  With reasonable clinical certainty she is safe to discharge home.  We will change the antibiotic to ciprofloxacin and have her take Zofran as needed for nausea/vomiting.  Imodium as needed for diarrhea.  Follow-up with PCP in 2-3 days for repeat electrolyte panel.   Discussed signs symptoms that should prompt her to return to the ER.  All questions answered prior to discharge.     Diagnosis:    ICD-10-CM    1. Nausea vomiting and diarrhea  R11.2     R19.7    2. Hyponatremia  E87.1      Discharge Medications:  Discharge Medication List as of 7/28/2022  7:04 AM      START taking these medications    Details   ciprofloxacin (CIPRO) 250 MG tablet Take 1 tablet (250 mg) by mouth 2 times daily for 3 days, Disp-6 tablet, R-0, E-Prescribe      ondansetron (ZOFRAN ODT) 4 MG ODT tab Take 1 tablet (4 mg) by mouth every 6 hours as needed for nausea or vomiting, Disp-10 tablet, R-0, E-Prescribe           Scribe Disclosure:  I, Suzanna Peace, am serving as a scribe at 5:03 AM on 7/28/2022 to document services personally performed by Alfredo Baez MD based on my observations and the provider's statements to me.        Alfredo Baez MD  07/28/22 0714

## 2022-07-28 NOTE — ED TRIAGE NOTES
Pt arrives from home w/ N/V. Pt reports she was started on Bactrim on 7/26 for a bladder infection. Reports following taking the medication on Tuesday she had a headache, but otherwise no N/V. Reports at 1630 yesterday she began having N/V/D and has been vomiting throughout the night. Denies abd pain, denies blood in stool or vomit. Pt is A&O x 4.

## 2022-07-29 ENCOUNTER — TELEPHONE (OUTPATIENT)
Dept: NEUROLOGY | Facility: CLINIC | Age: 67
End: 2022-07-29

## 2022-07-29 ENCOUNTER — TELEPHONE (OUTPATIENT)
Dept: RADIOLOGY | Facility: CLINIC | Age: 67
End: 2022-07-29

## 2022-07-29 NOTE — TELEPHONE ENCOUNTER
I reached out to patient regarding procedure date 8/3. We had to cancel and I let her know that I would reach out to her early next week in regards to a new procedure date.      Spencer Hsieh on 7/29/2022 at 2:46 PM

## 2022-07-29 NOTE — TELEPHONE ENCOUNTER
8/3/22 procedure with Dr. Lawrence canceled due to unforeseen circumstances. Scheduling will reach out next week to reschedule.     Received message that patient wanted a call from RN regarding cancellation. [facility] for patient to return call. Tania Farias RN 7/29/2022 3:03 PM      Addendum:  Spoke with patient. Informed of above. Patient verbalized understanding. Tania Farias RN 7/29/2022 3:37 PM

## 2022-08-03 ENCOUNTER — HOSPITAL ENCOUNTER (INPATIENT)
Facility: CLINIC | Age: 67
Setting detail: SURGERY ADMIT
End: 2022-08-03
Payer: COMMERCIAL

## 2022-08-04 ENCOUNTER — TELEPHONE (OUTPATIENT)
Dept: RADIOLOGY | Facility: CLINIC | Age: 67
End: 2022-08-04

## 2022-08-04 RX ORDER — SODIUM CHLORIDE, SODIUM LACTATE, POTASSIUM CHLORIDE, CALCIUM CHLORIDE 600; 310; 30; 20 MG/100ML; MG/100ML; MG/100ML; MG/100ML
INJECTION, SOLUTION INTRAVENOUS CONTINUOUS
Status: CANCELLED | OUTPATIENT
Start: 2022-08-04

## 2022-08-04 RX ORDER — LIDOCAINE 40 MG/G
CREAM TOPICAL
Status: CANCELLED | OUTPATIENT
Start: 2022-08-04

## 2022-08-04 NOTE — TELEPHONE ENCOUNTER
[facility] Call Center    Phone Message    May a detailed message be left on voicemail: yes     Reason for Call: Other: Pt called and stated that she still has not heard back regarding rescheduling her surgery that was cancelled. Pt stated that she received a call  Today 08/04/22 stating that she still has the surgery tomorrow 08/05/22 so she would like to know what is going on. Please call Pt back ASAP to discuss further.    Can call Pt  as well if Pt doesn't answer [phone]  Action Taken: Message routed to:  [facility] ([facility]): [facility] Neurology    Travel Screening: Not Applicable

## 2022-08-04 NOTE — TELEPHONE ENCOUNTER
I reached out to the patient regarding her procedure. She is a little confused as to why Dr. Alcantar is not helping with this case and I believe we went over this with her the last time that we scheduled.. I again reached out to IR regarding procedure date 8/26 and 9/2, but still have yet to hear back on anything. I included Jacquelyn in on these messages since I will be out of office tomorrow.      Spencer Hsieh on 8/4/2022 at 3:25 PM

## 2022-08-04 NOTE — TELEPHONE ENCOUNTER
Returned call to patient and told her that the procedure was defiantly cancelled.  Told her I did not know why she has not heard back about re-scheduling, but that I would look into that and we would get back to her.    Satnam Gao RN

## 2022-08-05 ENCOUNTER — TELEPHONE (OUTPATIENT)
Dept: RADIOLOGY | Facility: CLINIC | Age: 67
End: 2022-08-05

## 2022-08-05 NOTE — TELEPHONE ENCOUNTER
Called patient to schedule Procedure with     Date of Procedure: 9/9    Location of Procedure: [facility]    Pre-Op H&P: PCP     Imaging Scheduled:  No    Scheduled COVID-19 Testing and Labs: Yes    Additional comments: Spoke with patient to schedule. She was very happy to be scheduled again and will make all appointments. She has multiple questions and has not met . she would like a phone call to discuss and possibly speak with him.    Anna C. Schoenecker on 8/5/2022 at 10:06 AM

## 2022-08-08 ENCOUNTER — TELEPHONE (OUTPATIENT)
Dept: NEUROSURGERY | Facility: CLINIC | Age: 67
End: 2022-08-08

## 2022-08-08 NOTE — TELEPHONE ENCOUNTER
[facility] Call Center    Phone Message    May a detailed message be left on voicemail: yes     Reason for Call: Other: Pt is calling because she is having a surgery on 09/09. She has some questions and she would like Dr. Lawrence's nurse Tania to give her a call. Please call the landline or her  Jayden's cell phone.   Jayden Cell Ph: [phone]    Action Taken: Message routed to:  [facility] ([facility]): NEUROSURGERY    Travel Screening: Not Applicable

## 2022-08-08 NOTE — TELEPHONE ENCOUNTER
Spoke with patient. States she is anxious about procedure and would like to schedule appointment with Dr. Lawrence to discuss as she has not met him yet. She was under the impression that Dr. Alcantar would be involved as well.     Scheduled appointment for video visit on 8/16/22 at 0945. Patient verbalized understanding and very appreciative of the call. Tania Farias RN 8/8/2022 4:04 PM

## 2022-08-12 NOTE — TELEPHONE ENCOUNTER
RECORDS RECEIVED FROM: internal   REASON FOR VISIT: SPINAL VASCULAR LESION   Date of Appt: 8/16/22   NOTES (FOR ALL VISITS) STATUS DETAILS   OFFICE NOTE from referring provider Internal Dr Afia Alcantar @ [facility]:  6/1/22   OFFICE NOTE from other specialist Internal Dr Laurent Nielson @ [facility]:  5/9/22   MEDICATION LIST Internal    IMAGING  (FOR ALL VISITS)     MRI (HEAD, NECK, SPINE) Internal [facility]:  MRI Brain 7/21/22  MRA Brain COW 7/11/22  MRI Thoracic Spine 5/27/22  MRA Spinal canal 5/26/22    [facility] [place]:  MRI Lumbar Spine 5/6/22  MRI Thoracic Spine 5/6/22  MRI Lumbar Spine 4/18/22

## 2022-08-15 ENCOUNTER — PATIENT OUTREACH (OUTPATIENT)
Dept: NEUROLOGY | Facility: CLINIC | Age: 67
End: 2022-08-15

## 2022-08-15 ENCOUNTER — OFFICE VISIT (OUTPATIENT)
Dept: INTERNAL MEDICINE | Facility: CLINIC | Age: 67
End: 2022-08-15
Payer: COMMERCIAL

## 2022-08-15 VITALS
HEART RATE: 84 BPM | SYSTOLIC BLOOD PRESSURE: 120 MMHG | RESPIRATION RATE: 16 BRPM | DIASTOLIC BLOOD PRESSURE: 78 MMHG | TEMPERATURE: 96 F | HEIGHT: 61 IN | OXYGEN SATURATION: 96 % | WEIGHT: 150 LBS | BODY MASS INDEX: 28.32 KG/M2

## 2022-08-15 DIAGNOSIS — H61.23 IMPACTED CERUMEN OF BOTH EARS: ICD-10-CM

## 2022-08-15 DIAGNOSIS — Z01.818 PRE-OP EXAM: Primary | ICD-10-CM

## 2022-08-15 DIAGNOSIS — R73.09 ELEVATED GLUCOSE: ICD-10-CM

## 2022-08-15 DIAGNOSIS — R22.2 PARASPINAL MASS: ICD-10-CM

## 2022-08-15 LAB
ERYTHROCYTE [DISTWIDTH] IN BLOOD BY AUTOMATED COUNT: 13.4 % (ref 10–15)
HBA1C MFR BLD: 5.7 % (ref 0–5.6)
HCT VFR BLD AUTO: 40.1 % (ref 35–47)
HGB BLD-MCNC: 13 G/DL (ref 11.7–15.7)
MCH RBC QN AUTO: 28.2 PG (ref 26.5–33)
MCHC RBC AUTO-ENTMCNC: 32.4 G/DL (ref 31.5–36.5)
MCV RBC AUTO: 87 FL (ref 78–100)
PLATELET # BLD AUTO: 390 10E3/UL (ref 150–450)
RBC # BLD AUTO: 4.61 10E6/UL (ref 3.8–5.2)
WBC # BLD AUTO: 5.5 10E3/UL (ref 4–11)

## 2022-08-15 PROCEDURE — 83036 HEMOGLOBIN GLYCOSYLATED A1C: CPT

## 2022-08-15 PROCEDURE — 93000 ELECTROCARDIOGRAM COMPLETE: CPT

## 2022-08-15 PROCEDURE — 99214 OFFICE O/P EST MOD 30 MIN: CPT | Mod: 25

## 2022-08-15 PROCEDURE — 69209 REMOVE IMPACTED EAR WAX UNI: CPT | Mod: 59

## 2022-08-15 PROCEDURE — 36415 COLL VENOUS BLD VENIPUNCTURE: CPT

## 2022-08-15 PROCEDURE — 85027 COMPLETE CBC AUTOMATED: CPT

## 2022-08-15 PROCEDURE — 80048 BASIC METABOLIC PNL TOTAL CA: CPT

## 2022-08-15 ASSESSMENT — ENCOUNTER SYMPTOMS
LIGHT-HEADEDNESS: 0
COUGH: 0
WHEEZING: 0
DIFFICULTY URINATING: 0
HEMATURIA: 0
FEVER: 0
VOMITING: 0
DIZZINESS: 0
CHEST TIGHTNESS: 0
DYSURIA: 0
SHORTNESS OF BREATH: 0
BLOOD IN STOOL: 0
NUMBNESS: 0
NAUSEA: 0
PALPITATIONS: 0
CHILLS: 0

## 2022-08-15 NOTE — PATIENT INSTRUCTIONS
Take your normally scheduled medications the night before surgery.    Refrain from using any ibuprofen, aleve, aspirin, Vitamin A or Vitamin E, Omega 3's, Omega 6's or fish oil 7-10 days prior to your procedure. These can increase your risk of bleeding. Tylenol is okay to use if needed for pain.     Lab results will be available soon on [email or web address].

## 2022-08-15 NOTE — PROGRESS NOTES
[place] [address] [facility] [address]  Phone: [phone]  Primary Provider: Arely Ashton  Pre-op Performing Provider: REECE SEAY      PREOPERATIVE EVALUATION:  Today's date: 8/15/2022    Mylene Vasquez is a 67 year old female who presents for a preoperative evaluation.    Surgical Information:  Surgery/Procedure: Bilateral Carotid cerebral angiogram embolization   Surgery Location: [facility]   Surgeon: Dr. Lawrence   Surgery Date: 9/9/22  Time of Surgery: 10:00 am   Where patient plans to recover: At home with family  Fax number for surgical facility: Note does not need to be faxed, will be available electronically in Epic.    Type of Anesthesia Anticipated: to be determined    Assessment & Plan     The proposed surgical procedure is considered INTERMEDIATE risk.    (Z01.818) Pre-op exam  (primary encounter diagnosis)  (R22.2) Paraspinal mass  Plan: EKG 12-lead complete w/read - Clinics,         Asymptomatic COVID-19 Virus (Coronavirus) by         PCR Nasopharyngeal, Basic metabolic panel  (Ca,        Cl, CO2, Creat, Gluc, K, Na, BUN), CBC with         platelets            (R73.09) Elevated glucose  Plan: Hemoglobin A1c           (H61.23) Impacted cerumen of both ears  Comment: b/l ears with impacted cerumen. Pt would like ear wash today.       RECOMMENDATION:  APPROVAL GIVEN to proceed with proposed procedure, without further diagnostic evaluation.        Subjective     HPI related to upcoming procedure:      Ms. 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 The appearance favors a high flow vascular malformation, although other neoplasm is less likely. She now presents for the above procedure.     Preop Questions 8/15/2022   1. Have you ever had a heart attack or stroke? No   2. Have you ever had surgery on your heart or blood vessels, such as a stent placement, a  coronary artery bypass, or surgery on an artery in your head, neck, heart, or legs? No   3. Do you have chest pain with activity? No   4. Do you have a history of  heart failure? No   5. Do you currently have a cold, bronchitis or symptoms of other infection? No   6. Do you have a cough, shortness of breath, or wheezing? No   7. Do you or anyone in your family have previous history of blood clots? No   8. Do you or does anyone in your family have a serious bleeding problem such as prolonged bleeding following surgeries or cuts? No   9. Have you ever had problems with anemia or been told to take iron pills? No   10. Have you had any abnormal blood loss such as black, tarry or bloody stools, or abnormal vaginal bleeding? No   11. Have you ever had a blood transfusion? No   12. Are you willing to have a blood transfusion if it is medically needed before, during, or after your surgery? Yes   13. Have you or any of your relatives ever had problems with anesthesia? No   14. Do you have sleep apnea, excessive snoring or daytime drowsiness? No   15. Do you have any artifical heart valves or other implanted medical devices like a pacemaker, defibrillator, or continuous glucose monitor? No   16. Do you have artificial joints? No   17. Are you allergic to latex? No       Health Care Directive:  Patient does not have a Health Care Directive or Living Will: Discussed advance care planning with patient; however, patient declined at this time.    Preoperative Review of :   reviewed - controlled substances prescribed by other outside provider(s). Had 3 short term prescriptions in April of 2022 for anxiety with MRI's.       Status of Chronic Conditions:  See problem list for active medical problems.  Problems all longstanding and stable, except as noted/documented.  See ROS for pertinent symptoms related to these conditions.      Review of Systems   Constitutional: Negative for chills and fever.   Respiratory: Negative for  cough, chest tightness, shortness of breath and wheezing.    Cardiovascular: Negative for chest pain and palpitations.   Gastrointestinal: Negative for blood in stool, nausea and vomiting.   Genitourinary: Negative for difficulty urinating, dysuria and hematuria.   Neurological: Negative for dizziness, light-headedness and numbness.     -Does note recent episode of nausea, vomiting, and diarrhea while on antibiotic for UTI on 7/28/22.. Notes symptoms have resolved since.     Patient Active Problem List    Diagnosis Date Noted     Paraspinal mass 04/25/2022     Priority: Medium     Facet arthropathy, lumbosacral 04/08/2022     Priority: Medium     Lumbosacral radiculopathy 04/08/2022     Priority: Medium     Anxiety      Priority: Medium     Callus of foot 11/08/2015     Priority: Medium     DDD (degenerative disc disease), lumbosacral 11/08/2015     Priority: Medium     ACP (advance care planning) 03/26/2014     Priority: Medium     Advance Care Planning:   ACP Review and Resources Provided:  Reviewed chart for advance care plan.  Mylene Vasquez has no plan or code status on file. Discussed available resources and provided with information. Confirmed code status reflects current choices pending further ACP discussions.  Confirmed/documented designated decision maker(s). See permanent comments section of demographics in clinical tab.   Added by Vibha Mcdowell on 3/26/2014        Diagnosis updated by automated process. Provider to review and confirm.       Health Care Home 03/26/2014     Priority: Medium     State Tier Level:  Tier 0  Status:  NA  Care Coordinator:      See Letters for [facility] Care Plan  Date:  August 12, 2014           Generalized anxiety disorder 03/26/2014     Priority: Medium     Diagnosis updated by automated process. Provider to review and confirm.        Past Medical History:   Diagnosis Date     Anxiety      Chronic midline low back pain with right-sided sciatica      Depressive disorder       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     Past Surgical History:   Procedure Laterality Date     COLONOSCOPY       COLONOSCOPY N/A 11/26/2018    Procedure: COLONOSCOPY ;  Surgeon: Ilsa Aguirre MD;  Location:  GI     URETHROPLASTY  1967     Current Outpatient Medications   Medication Sig Dispense Refill     clomiPRAMINE (ANAFRANIL) 50 MG capsule every evening Take three tablets at bedtime       gabapentin (NEURONTIN) 300 MG capsule Take one tab at night for 3 days.  If able to tolerate, take one tab twice daily for 3 days, then take one tab three times daily. (Patient taking differently: as needed Take one tab at night for 3 days.  If able to tolerate, take one tab twice daily for 3 days, then take one tab three times daily.) 90 capsule 4     PARoxetine (PAXIL) 30 MG tablet Take by mouth every evening       VITAMIN D PO Take by mouth every 7 days         Allergies   Allergen Reactions     Bactrim [Sulfamethoxazole W/Trimethoprim] Nausea and Vomiting and Diarrhea     Pcn [Penicillins]      itchy        Social History     Tobacco Use     Smoking status: Never Smoker     Smokeless tobacco: Never Used   Substance Use Topics     Alcohol use: Not Currently     Comment: very little     History   Drug Use Unknown        Objective     LMP 03/26/2007     Physical Exam  Constitutional:       General: She is not in acute distress.     Appearance: Normal appearance. She is not ill-appearing, toxic-appearing or diaphoretic.   HENT:      Head: Normocephalic and atraumatic.      Right Ear: External ear normal. There is impacted cerumen.      Left Ear: External ear normal. There is impacted cerumen.   Eyes:      Conjunctiva/sclera: Conjunctivae normal.   Cardiovascular:      Rate and Rhythm: Normal rate and regular rhythm.      Pulses: Normal pulses.      Heart sounds: Normal heart sounds.   Pulmonary:      Effort: Pulmonary effort is normal.      Breath sounds: Normal breath sounds.   Skin:     General: Skin is warm and dry.    Neurological:      Mental Status: She is alert and oriented to person, place, and time.   Psychiatric:         Mood and Affect: Mood normal.         Behavior: Behavior normal.         Thought Content: Thought content normal.         Judgment: Judgment normal.         Recent Labs   Lab Test 07/28/22  0615 07/28/22  0422 04/15/21  0857   HGB  --  12.8 13.9   PLT  --  298 314   * 118* 134   POTASSIUM 3.7 3.8 4.2   CR 0.53 0.55 0.72      Diagnostics:  Labs pending at this time.  Results will be reviewed when available.   EKG: appears normal, NSR, normal axis, normal intervals, no acute ST/T changes c/w ischemia, no LVH by voltage criteria, unchanged from previous tracings    Revised Cardiac Risk Index (RCRI):  The patient has the following serious cardiovascular risks for perioperative complications:   - No serious cardiac risks = 0 points     RCRI Interpretation: 0 points: Class I (very low risk - 0.4% complication rate)       Signed Electronically by: FRANKLIN Allison CNP  Copy of this evaluation report is provided to requesting physician.    ekg

## 2022-08-15 NOTE — PATIENT INSTRUCTIONS
You are scheduled for a cerebral angiogram with Embolization , under general anesthesia, with Dr. Lawrence on 9/9/22 at 12:00 PM.     Please follow these instructions:    * Prior to your procedure you will need to obtain COVID-19 testing and blood work within 2-4 days of your scheduled procedure. These appointments have been scheduled for you at [facility], located at [address] [place] [address]. Their phone number is [phone].  Appointments: COVID Test 9/6/22 at 11:00 AM, Blood Work 9/6/22 at 11:10 AM    * You will need a pre-op physical within 30 days prior to your procedure. You may do this with your primary care provider or with the Pre-Operative Assessment Center (PAC), located at [facility] at [address] [place] [address]. The PAC phone number is [phone].    * You should arrive at the Surgery Admission Unit (3C), on the third floor, at the [facility] at 10:00 AM. The address is [address] [place] [address]. The phone number is [phone].     * Do not eat after Midnight; you may drink clear liquids (includes water, Jell-O, clear broth, apple juice or any non-carbonated beverage that you can see through) until 10:00 AM.     * You may take your medications with a sip of water the morning of the procedure.     * You will stay overnight at the hospital for observation after the procedure. We aim to discharge you by 11:00 AM the following day. Please have your ride available by 10:00 AM.     PLEASE NOTE our COVID-19 visitors policy: For the protection of our patients and visitors, patients are allowed one consistent visitor, 18 years of age or older, per adult patient in the hospital. Visitors must wear a mask at all times while in the hospital.     All discharge instructions will be given to the  or volunteer. Documentation for the post-operative plan will be given to the  patient and . Patients are required to have someone to stay with them for 24 hours after their procedure.    If you have questions regarding your procedure, please contact me at [phone], option 3.    If you need to cancel, reschedule or have procedure scheduling related questions, please call Jacquelyn at [phone].    Thank you,  Tania Farias RN, CNRN, SCRN  Stroke & Neuroendovascular Care Coordinator

## 2022-08-16 ENCOUNTER — VIRTUAL VISIT (OUTPATIENT)
Dept: NEUROSURGERY | Facility: CLINIC | Age: 67
End: 2022-08-16
Payer: COMMERCIAL

## 2022-08-16 ENCOUNTER — PRE VISIT (OUTPATIENT)
Dept: NEUROSURGERY | Facility: CLINIC | Age: 67
End: 2022-08-16

## 2022-08-16 DIAGNOSIS — Q28.8 SPINAL VASCULAR MALFORMATION: Primary | ICD-10-CM

## 2022-08-16 LAB
ANION GAP SERPL CALCULATED.3IONS-SCNC: 2 MMOL/L (ref 3–14)
BUN SERPL-MCNC: 14 MG/DL (ref 7–30)
CALCIUM SERPL-MCNC: 8.9 MG/DL (ref 8.5–10.1)
CHLORIDE BLD-SCNC: 101 MMOL/L (ref 94–109)
CO2 SERPL-SCNC: 29 MMOL/L (ref 20–32)
CREAT SERPL-MCNC: 0.7 MG/DL (ref 0.52–1.04)
GFR SERPL CREATININE-BSD FRML MDRD: >90 ML/MIN/1.73M2
GLUCOSE BLD-MCNC: 91 MG/DL (ref 70–99)
POTASSIUM BLD-SCNC: 4.7 MMOL/L (ref 3.4–5.3)
SARS-COV-2 RNA RESP QL NAA+PROBE: NEGATIVE
SODIUM SERPL-SCNC: 132 MMOL/L (ref 133–144)

## 2022-08-16 PROCEDURE — U0003 INFECTIOUS AGENT DETECTION BY NUCLEIC ACID (DNA OR RNA); SEVERE ACUTE RESPIRATORY SYNDROME CORONAVIRUS 2 (SARS-COV-2) (CORONAVIRUS DISEASE [COVID-19]), AMPLIFIED PROBE TECHNIQUE, MAKING USE OF HIGH THROUGHPUT TECHNOLOGIES AS DESCRIBED BY CMS-2020-01-R: HCPCS

## 2022-08-16 PROCEDURE — 99203 OFFICE O/P NEW LOW 30 MIN: CPT | Mod: 95 | Performed by: RADIOLOGY

## 2022-08-16 PROCEDURE — U0005 INFEC AGEN DETEC AMPLI PROBE: HCPCS

## 2022-08-16 NOTE — PROGRESS NOTES
Mylene is a 67 year old who is being evaluated via a billable video visit.    Ms. Vasquez suffered from left-sided lower back pain with some recent radiation to her left lower limb for a while.  On recent MRI imaging of her thoracic and lumbar spine, she was found to have an enhancing mass in the left paraspinal muscles with multiple flow voids in them.  Subsequent MRI angiography of the spine revealed that these flow voids had arterialized flow and originated somewhere between T10 and T12.  She had subsequently seen Dr. Alcantar in the vascular clinic.    I was consulted by Dr. 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  The plan is for us to perform a spinal angiogram, and possibly embolize this AVM with subsequent sclerotherapy by Dr. Alcantar if needed.  We also plan to perform a biopsy of the paraspinal mass to make sure that there is no malignant component to this mass.    On today's visit, I discussed with Ms. Vasquez regarding my role in the evaluation and treatment of this malformation, the details of a spinal angiogram via a femoral or radial approach, the use of embolic agents to plug up the AVM, the risks associated with spinal angiography and embolization including spinal cord ischemia injury, paralysis etc., and possible staged embolization of this malformation.  I also reassured the patient that we will not be canceling her procedure again, previously, her procedure had been scheduled at [facility], but I had not felt comfortable performing a spinal angiogram and embolization at that institution, especially since Dr. Alcantar is not available at that institution.    I also answered questions from the patient regarding how long the procedure takes, whether general anesthesia is truly needed, and will need for postoperative stay in our ICU.  I think at the end of her discussion, she is fairly convinced that we are on the right track, and  agreed to proceed with the angiogram under general anesthesia with embolization.    I spent a total of 20 minutes in face-to-face discussions with the patient.  I did not detect any obvious focal neurological deficits during my discussion with the patient.

## 2022-08-16 NOTE — LETTER
8/16/2022       RE: Mylene Vasquez  [address] [place] [address]     Dear Colleague,    Thank you for referring your patient, Mylene Vasquez, to the [facility]UROS[facility]IC [place] [facility]. Please see a copy of my visit note below.    Mylene is a 67 year old who is being evaluated via a billable video visit.    Ms. Vasquez suffered from left-sided lower back pain with some recent radiation to her left lower limb for a while.  On recent MRI imaging of her thoracic and lumbar spine, she was found to have an enhancing mass in the left paraspinal muscles with multiple flow voids in them.  Subsequent MRI angiography of the spine revealed that these flow voids had arterialized flow and originated somewhere between T10 and T12.  She had subsequently seen Dr. Alcantar in the vascular clinic.    I was consulted by Dr. 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  The plan is for us to perform a spinal angiogram, and possibly embolize this AVM with subsequent sclerotherapy by Dr. Alcantar if needed.  We also plan to perform a biopsy of the paraspinal mass to make sure that there is no malignant component to this mass.    On today's visit, I discussed with Ms. Vasquez regarding my role in the evaluation and treatment of this malformation, the details of a spinal angiogram via a femoral or radial approach, the use of embolic agents to plug up the AVM, the risks associated with spinal angiography and embolization including spinal cord ischemia injury, paralysis etc., and possible staged embolization of this malformation.  I also reassured the patient that we will not be canceling her procedure again, previously, her procedure had been scheduled at [facility], but I had not felt comfortable performing a spinal angiogram and embolization at that institution, especially since Dr. Alcantar is  not available at that institution.    I also answered questions from the patient regarding how long the procedure takes, whether general anesthesia is truly needed, and will need for postoperative stay in our ICU.  I think at the end of her discussion, she is fairly convinced that we are on the right track, and agreed to proceed with the angiogram under general anesthesia with embolization.    I spent a total of 20 minutes in face-to-face discussions with the patient.  I did not detect any obvious focal neurological deficits during my discussion with the patient.      Sincerely,    Xavier Lawrence MD

## 2022-08-16 NOTE — NURSING NOTE
Chief Complaint   Patient presents with     Consult     Consult for procedure, spinal vascular lesion      Allergies and medications reviewed with patient.    Tiffanie Paniagua, NICOLASF

## 2022-08-16 NOTE — PROGRESS NOTES
Left message with patients spouse to return call. Tania Farias RN 8/16/2022 4:23 PM    Addendum:  LVMM informing patient instructions sent via [employer]. Encouraged to return call with questions. Tania Farias RN 8/17/2022 10:36 AM

## 2022-08-17 ENCOUNTER — TELEPHONE (OUTPATIENT)
Dept: NEUROSURGERY | Facility: CLINIC | Age: 67
End: 2022-08-17

## 2022-08-17 NOTE — TELEPHONE ENCOUNTER
Spoke with patient. She received instructions via [employer]. She has no questions. Patient has my contact information and was encouraged to call with questions/concerns. Tania Farias RN 8/17/2022 4:43 PM

## 2022-08-17 NOTE — TELEPHONE ENCOUNTER
[facility] Call Center    Phone Message    May a detailed message be left on voicemail: yes     Reason for Call: Other: Patient returning call to Joshua. Please call her, if unable to reach, contact spouse Jayden at [phone].     Action Taken: Message routed to:  [facility] ([facility] neurosurgery    Travel Screening: Not Applicable

## 2022-08-17 NOTE — TELEPHONE ENCOUNTER
Writer routed to Stroke/Neurovascular Nurse Pool   Attn: Tania Farias, RNCC     Susannah Arizmendi LPN  Neurosurgery

## 2022-08-19 ENCOUNTER — HOSPITAL ENCOUNTER (OUTPATIENT)
Facility: CLINIC | Age: 67
DRG: 301 | End: 2022-08-19
Attending: RADIOLOGY | Admitting: RADIOLOGY
Payer: COMMERCIAL

## 2022-08-22 NOTE — TELEPHONE ENCOUNTER
Writer routed to Stroke/Neurovascular Nurse Pool   Attn: RNCC covering for DANY Roth   *Patient with question on pre-op fasting.     Susannah Arizmendi LPN  Neurosurgery

## 2022-08-22 NOTE — TELEPHONE ENCOUNTER
[facility] Call Center    Phone Message    May a detailed message be left on voicemail: yes     Reason for Call: Other: Pt is calling to speak with nurse Tania. She is having a surgery done and she has some questions about having to fast. She would like a call back from Tania to discuss.     Action Taken: Message routed to:  [facility] ([facility]): NEUROSURGERY    Travel Screening: Not Applicable

## 2022-08-26 ENCOUNTER — TELEPHONE (OUTPATIENT)
Dept: NEUROSURGERY | Facility: CLINIC | Age: 67
End: 2022-08-26

## 2022-08-26 NOTE — TELEPHONE ENCOUNTER
[facility] Call Center    Phone Message    May a detailed message be left on voicemail: yes     Reason for Call: Other: Patient is requesting a call back again regarding her procedure coming up on  9/9/2022. She has questions about fasting and what labs are needed before the surgery. Please call her ASAP at # [phone] or [phone]     Action Taken: Message routed to:  [facility] Neurosurgery     Travel Screening: Not Applicable

## 2022-09-06 ENCOUNTER — LAB (OUTPATIENT)
Dept: URGENT CARE | Facility: URGENT CARE | Age: 67
End: 2022-09-06
Payer: COMMERCIAL

## 2022-09-06 ENCOUNTER — LAB (OUTPATIENT)
Dept: LAB | Facility: CLINIC | Age: 67
End: 2022-09-06
Payer: COMMERCIAL

## 2022-09-06 DIAGNOSIS — R22.2 PARASPINAL MASS: ICD-10-CM

## 2022-09-06 DIAGNOSIS — Z20.822 ENCOUNTER FOR LABORATORY TESTING FOR COVID-19 VIRUS: ICD-10-CM

## 2022-09-06 DIAGNOSIS — I99.8 OTHER DISORDER OF CIRCULATORY SYSTEM: ICD-10-CM

## 2022-09-06 DIAGNOSIS — Z11.59 NEED FOR HEPATITIS C SCREENING TEST: Primary | ICD-10-CM

## 2022-09-06 LAB
ANION GAP SERPL CALCULATED.3IONS-SCNC: 5 MMOL/L (ref 3–14)
APTT PPP: 25 SECONDS (ref 22–38)
BUN SERPL-MCNC: 13 MG/DL (ref 7–30)
CALCIUM SERPL-MCNC: 9.2 MG/DL (ref 8.5–10.1)
CHLORIDE BLD-SCNC: 98 MMOL/L (ref 94–109)
CO2 SERPL-SCNC: 29 MMOL/L (ref 20–32)
CREAT SERPL-MCNC: 0.68 MG/DL (ref 0.52–1.04)
ERYTHROCYTE [DISTWIDTH] IN BLOOD BY AUTOMATED COUNT: 13.8 % (ref 10–15)
GFR SERPL CREATININE-BSD FRML MDRD: >90 ML/MIN/1.73M2
GLUCOSE BLD-MCNC: 103 MG/DL (ref 70–99)
HCT VFR BLD AUTO: 42 % (ref 35–47)
HGB BLD-MCNC: 13.5 G/DL (ref 11.7–15.7)
INR PPP: 0.96 (ref 0.85–1.15)
MCH RBC QN AUTO: 28.2 PG (ref 26.5–33)
MCHC RBC AUTO-ENTMCNC: 32.1 G/DL (ref 31.5–36.5)
MCV RBC AUTO: 88 FL (ref 78–100)
PLATELET # BLD AUTO: 349 10E3/UL (ref 150–450)
POTASSIUM BLD-SCNC: 3.8 MMOL/L (ref 3.4–5.3)
RBC # BLD AUTO: 4.78 10E6/UL (ref 3.8–5.2)
SARS-COV-2 RNA RESP QL NAA+PROBE: NEGATIVE
SODIUM SERPL-SCNC: 132 MMOL/L (ref 133–144)
WBC # BLD AUTO: 6 10E3/UL (ref 4–11)

## 2022-09-06 PROCEDURE — 85610 PROTHROMBIN TIME: CPT

## 2022-09-06 PROCEDURE — 80048 BASIC METABOLIC PNL TOTAL CA: CPT

## 2022-09-06 PROCEDURE — U0005 INFEC AGEN DETEC AMPLI PROBE: HCPCS

## 2022-09-06 PROCEDURE — 36415 COLL VENOUS BLD VENIPUNCTURE: CPT

## 2022-09-06 PROCEDURE — U0003 INFECTIOUS AGENT DETECTION BY NUCLEIC ACID (DNA OR RNA); SEVERE ACUTE RESPIRATORY SYNDROME CORONAVIRUS 2 (SARS-COV-2) (CORONAVIRUS DISEASE [COVID-19]), AMPLIFIED PROBE TECHNIQUE, MAKING USE OF HIGH THROUGHPUT TECHNOLOGIES AS DESCRIBED BY CMS-2020-01-R: HCPCS

## 2022-09-06 PROCEDURE — 85027 COMPLETE CBC AUTOMATED: CPT

## 2022-09-06 PROCEDURE — 85730 THROMBOPLASTIN TIME PARTIAL: CPT

## 2022-09-08 ENCOUNTER — ANESTHESIA EVENT (OUTPATIENT)
Dept: SURGERY | Facility: CLINIC | Age: 67
DRG: 301 | End: 2022-09-08
Payer: COMMERCIAL

## 2022-09-08 NOTE — ANESTHESIA PREPROCEDURE EVALUATION
Anesthesia Pre-Procedure Evaluation    Patient: Mylene Vasquez   MRN: [id number] : 1955        Procedure : Procedure(s):  Spinal angiogram, paraspinal mass biopsy, possible AVM embolization, possible sclerotherapy       Past Medical History:   Diagnosis Date     Anxiety      Chronic midline low back pain with right-sided sciatica      Depressive disorder      Paraspinal mass 2022      Past Surgical History:   Procedure Laterality Date     COLONOSCOPY       COLONOSCOPY N/A 2018    Procedure: COLONOSCOPY ;  Surgeon: Ilsa Aguirre MD;  Location: [facility]     URETHROPLASTY        Allergies   Allergen Reactions     Bactrim [Sulfamethoxazole W/Trimethoprim] Nausea and Vomiting and Diarrhea     Pcn [Penicillins]      itchy      Social History     Tobacco Use     Smoking status: Never Smoker     Smokeless tobacco: Never Used   Substance Use Topics     Alcohol use: Not Currently     Comment: very little      Wt Readings from Last 1 Encounters:   08/15/22 68 kg (150 lb)        Anesthesia Evaluation   Pt has had prior anesthetic. Type: General.    No history of anesthetic complications       ROS/MED HX  ENT/Pulmonary:  - neg pulmonary ROS  (-) tobacco use, asthma and COPD   Neurologic: Comment: Tiny focal outpouching along the anterior surface of the left A2 anterior cerebral artery segment is suspicious for a tiny infundibular aneurysm.   (-) no CVA and no TIA   Cardiovascular:  - neg cardiovascular ROS   (+) hypertension-----Previous cardiac testing   Echo: Date: 22 Results:  Left Ventricle  Left ventricular size, wall motion and function are normal. The ejection  fraction is 55-60%. Relative wall thickness is increased consistent with  concentric remodeling. Left ventricular diastolic function is not assessable.  No regional wall motion abnormalities are seen.     Right Ventricle  Right ventricular function, chamber size, wall motion, and thickness are  normal.     Atria  Both atria appear  normal. The atrial septum is intact as assessed by color  Doppler .     Mitral Valve  The mitral valve is normal. Trace mitral insufficiency is present.     Aortic Valve  The valve leaflets are not well visualized. On Doppler interrogation, there is  no significant stenosis or regurgitation.     Tricuspid Valve  The tricuspid valve is normal. Trace tricuspid insufficiency is present.     Pulmonic Valve  The pulmonic valve cannot be assessed.     Vessels  The aorta root is normal. The thoracic aorta is normal. The pulmonary artery  cannot be assessed. Ascending aorta 2.9 cm. IVC diameter <2.1 cm collapsing  >50% with sniff suggests a normal RA pressure of 3 mmHg.     Pericardium  No pericardial effusion is present.  Stress Test: Date: Results:    ECG Reviewed: Date: 8/15/22 Results:  NSR  Cath: Date: Results:      METS/Exercise Tolerance:     Hematologic:  - neg hematologic  ROS  (-) anemia   Musculoskeletal:   (+) arthritis,     GI/Hepatic:  - neg GI/hepatic ROS  (-) GERD and liver disease   Renal/Genitourinary: Comment: Hyponatremia.   (-) renal disease   Endo:  - neg endo ROS  (-) Type II DM and thyroid disease   Psychiatric/Substance Use:     (+) psychiatric history anxiety and depression     Infectious Disease: Comment: COVID NEGATIVE 9/6/22 - neg infectious disease ROS     Malignancy:  - neg malignancy ROS     Other:  - neg other ROS    (+) , H/O Chronic Pain, (-) Any chance pregnant       Physical Exam    Airway        Mallampati: II   TM distance: > 3 FB   Neck ROM: full   Mouth opening: > 3 cm    Respiratory Devices and Support         Dental  no notable dental history         Cardiovascular   cardiovascular exam normal       Rhythm and rate: regular and normal     Pulmonary   pulmonary exam normal        breath sounds clear to auscultation           OUTSIDE LABS:  CBC:   Lab Results   Component Value Date    WBC 6.0 09/06/2022    WBC 5.5 08/15/2022    HGB 13.5 09/06/2022    HGB 13.0 08/15/2022    HCT 42.0  09/06/2022    HCT 40.1 08/15/2022     09/06/2022     08/15/2022     BMP:   Lab Results   Component Value Date     (L) 09/06/2022     (L) 08/15/2022    POTASSIUM 3.8 09/06/2022    POTASSIUM 4.7 08/15/2022    CHLORIDE 98 09/06/2022    CHLORIDE 101 08/15/2022    CO2 29 09/06/2022    CO2 29 08/15/2022    BUN 13 09/06/2022    BUN 14 08/15/2022    CR 0.68 09/06/2022    CR 0.70 08/15/2022     (H) 09/06/2022    GLC 91 08/15/2022     COAGS:   Lab Results   Component Value Date    PTT 25 09/06/2022    INR 0.96 09/06/2022     POC: No results found for: BGM, HCG, HCGS  HEPATIC:   Lab Results   Component Value Date    ALBUMIN 3.8 04/15/2021    PROTTOTAL 7.4 04/15/2021    ALT 50 04/15/2021    AST 19 04/15/2021    ALKPHOS 100 04/15/2021    BILITOTAL 0.3 04/15/2021     OTHER:   Lab Results   Component Value Date    A1C 5.7 (H) 08/15/2022    TOBIAS 9.2 09/06/2022    TSH 3.26 04/15/2021       Anesthesia Plan    ASA Status:  2   NPO Status:  NPO Appropriate    Anesthesia Type: General.     - Airway: ETT   Induction: Intravenous.   Maintenance: Balanced.   Techniques and Equipment:     - Lines/Monitors: Arterial Line, 2nd IV     Consents    Anesthesia Plan(s) and associated risks, benefits, and realistic alternatives discussed. Questions answered and patient/representative(s) expressed understanding.    - Discussed:     - Discussed with:  Patient      - Patient is DNR/DNI Status: No    Use of blood products discussed: Yes.     - Discussed with: Patient.     - Consented: consented to blood products            Reason for refusal: other.     Postoperative Care    Pain management: IV analgesics, Multi-modal analgesia.   PONV prophylaxis: Dexamethasone or Solumedrol, Ondansetron (or other 5HT-3)     Comments:                Robby Keller MD

## 2022-09-09 ENCOUNTER — ANESTHESIA (OUTPATIENT)
Dept: SURGERY | Facility: CLINIC | Age: 67
DRG: 301 | End: 2022-09-09
Payer: COMMERCIAL

## 2022-09-09 ENCOUNTER — APPOINTMENT (OUTPATIENT)
Dept: INTERVENTIONAL RADIOLOGY/VASCULAR | Facility: CLINIC | Age: 67
DRG: 301 | End: 2022-09-09
Attending: RADIOLOGY
Payer: COMMERCIAL

## 2022-09-09 ENCOUNTER — HOSPITAL ENCOUNTER (INPATIENT)
Facility: CLINIC | Age: 67
LOS: 1 days | Discharge: HOME | DRG: 301 | End: 2022-09-10
Attending: RADIOLOGY | Admitting: RADIOLOGY
Payer: COMMERCIAL

## 2022-09-09 DIAGNOSIS — R22.2 PARASPINAL MASS: ICD-10-CM

## 2022-09-09 LAB
ABO/RH(D): NORMAL
ANTIBODY SCREEN: NEGATIVE
GLUCOSE BLDC GLUCOMTR-MCNC: 111 MG/DL (ref 70–99)
SPECIMEN EXPIRATION DATE: NORMAL

## 2022-09-09 PROCEDURE — 75705 ARTERY X-RAYS SPINE: CPT | Mod: 26 | Performed by: RADIOLOGY

## 2022-09-09 PROCEDURE — C1887 CATHETER, GUIDING: HCPCS

## 2022-09-09 PROCEDURE — 36415 COLL VENOUS BLD VENIPUNCTURE: CPT

## 2022-09-09 PROCEDURE — 86901 BLOOD TYPING SEROLOGIC RH(D): CPT

## 2022-09-09 PROCEDURE — C2628 CATHETER, OCCLUSION: HCPCS

## 2022-09-09 PROCEDURE — C1769 GUIDE WIRE: HCPCS

## 2022-09-09 PROCEDURE — 250N000009 HC RX 250

## 2022-09-09 PROCEDURE — 250N000011 HC RX IP 250 OP 636: Performed by: STUDENT IN AN ORGANIZED HEALTH CARE EDUCATION/TRAINING PROGRAM

## 2022-09-09 PROCEDURE — 250N000025 HC SEVOFLURANE, PER MIN

## 2022-09-09 PROCEDURE — 370N000017 HC ANESTHESIA TECHNICAL FEE, PER MIN

## 2022-09-09 PROCEDURE — 250N000009 HC RX 250: Performed by: STUDENT IN AN ORGANIZED HEALTH CARE EDUCATION/TRAINING PROGRAM

## 2022-09-09 PROCEDURE — 75705 ARTERY X-RAYS SPINE: CPT | Mod: XS

## 2022-09-09 PROCEDURE — 250N000013 HC RX MED GY IP 250 OP 250 PS 637: Performed by: STUDENT IN AN ORGANIZED HEALTH CARE EDUCATION/TRAINING PROGRAM

## 2022-09-09 PROCEDURE — 86850 RBC ANTIBODY SCREEN: CPT

## 2022-09-09 PROCEDURE — 75705 ARTERY X-RAYS SPINE: CPT

## 2022-09-09 PROCEDURE — 272N000566 HC SHEATH CR3

## 2022-09-09 PROCEDURE — 36215 PLACE CATHETER IN ARTERY: CPT | Mod: XS | Performed by: RADIOLOGY

## 2022-09-09 PROCEDURE — 272N000192 HC ACCESSORY CR2

## 2022-09-09 PROCEDURE — 258N000003 HC RX IP 258 OP 636: Performed by: STUDENT IN AN ORGANIZED HEALTH CARE EDUCATION/TRAINING PROGRAM

## 2022-09-09 PROCEDURE — 76937 US GUIDE VASCULAR ACCESS: CPT | Mod: 26 | Performed by: RADIOLOGY

## 2022-09-09 PROCEDURE — 250N000011 HC RX IP 250 OP 636

## 2022-09-09 PROCEDURE — 120N000002 HC R&B MED SURG/OB UMMC

## 2022-09-09 PROCEDURE — 36215 PLACE CATHETER IN ARTERY: CPT

## 2022-09-09 PROCEDURE — 272N000567 HC SHEATH CR4

## 2022-09-09 PROCEDURE — 255N000002 HC RX 255 OP 636: Performed by: RADIOLOGY

## 2022-09-09 PROCEDURE — C1760 CLOSURE DEV, VASC: HCPCS

## 2022-09-09 PROCEDURE — 710N000010 HC RECOVERY PHASE 1, LEVEL 2, PER MIN

## 2022-09-09 PROCEDURE — 999N000141 HC STATISTIC PRE-PROCEDURE NURSING ASSESSMENT

## 2022-09-09 PROCEDURE — 258N000003 HC RX IP 258 OP 636

## 2022-09-09 PROCEDURE — 272N000506 HC NEEDLE CR6

## 2022-09-09 PROCEDURE — B01B1ZZ FLUOROSCOPY OF SPINAL CORD USING LOW OSMOLAR CONTRAST: ICD-10-PCS | Performed by: RADIOLOGY

## 2022-09-09 RX ORDER — NALOXONE HYDROCHLORIDE 0.4 MG/ML
0.2 INJECTION, SOLUTION INTRAMUSCULAR; INTRAVENOUS; SUBCUTANEOUS
Status: DISCONTINUED | OUTPATIENT
Start: 2022-09-09 | End: 2022-09-10 | Stop reason: HOSPADM

## 2022-09-09 RX ORDER — HEPARIN SODIUM 200 [USP'U]/100ML
1 INJECTION, SOLUTION INTRAVENOUS CONTINUOUS PRN
Status: DISCONTINUED | OUTPATIENT
Start: 2022-09-09 | End: 2022-09-10 | Stop reason: HOSPADM

## 2022-09-09 RX ORDER — CLOMIPRAMINE HYDROCHLORIDE 50 MG/1
50 CAPSULE ORAL AT BEDTIME
Status: DISCONTINUED | OUTPATIENT
Start: 2022-09-09 | End: 2022-09-10

## 2022-09-09 RX ORDER — FENTANYL CITRATE 50 UG/ML
25-50 INJECTION, SOLUTION INTRAMUSCULAR; INTRAVENOUS EVERY 5 MIN PRN
Status: DISCONTINUED | OUTPATIENT
Start: 2022-09-09 | End: 2022-09-10 | Stop reason: HOSPADM

## 2022-09-09 RX ORDER — ONDANSETRON 2 MG/ML
INJECTION INTRAMUSCULAR; INTRAVENOUS PRN
Status: DISCONTINUED | OUTPATIENT
Start: 2022-09-09 | End: 2022-09-09

## 2022-09-09 RX ORDER — SODIUM CHLORIDE 9 MG/ML
INJECTION, SOLUTION INTRAVENOUS CONTINUOUS
Status: DISCONTINUED | OUTPATIENT
Start: 2022-09-09 | End: 2022-09-10 | Stop reason: HOSPADM

## 2022-09-09 RX ORDER — SODIUM CHLORIDE, SODIUM LACTATE, POTASSIUM CHLORIDE, CALCIUM CHLORIDE 600; 310; 30; 20 MG/100ML; MG/100ML; MG/100ML; MG/100ML
INJECTION, SOLUTION INTRAVENOUS CONTINUOUS
Status: DISCONTINUED | OUTPATIENT
Start: 2022-09-09 | End: 2022-09-10 | Stop reason: HOSPADM

## 2022-09-09 RX ORDER — FENTANYL CITRATE 50 UG/ML
25 INJECTION, SOLUTION INTRAMUSCULAR; INTRAVENOUS EVERY 5 MIN PRN
Status: DISCONTINUED | OUTPATIENT
Start: 2022-09-09 | End: 2022-09-10 | Stop reason: HOSPADM

## 2022-09-09 RX ORDER — FLUMAZENIL 0.1 MG/ML
0.2 INJECTION, SOLUTION INTRAVENOUS
Status: DISCONTINUED | OUTPATIENT
Start: 2022-09-09 | End: 2022-09-10 | Stop reason: HOSPADM

## 2022-09-09 RX ORDER — OXYCODONE HYDROCHLORIDE 5 MG/1
5 TABLET ORAL EVERY 4 HOURS PRN
Status: DISCONTINUED | OUTPATIENT
Start: 2022-09-09 | End: 2022-09-10 | Stop reason: HOSPADM

## 2022-09-09 RX ORDER — PROCHLORPERAZINE MALEATE 5 MG
5 TABLET ORAL EVERY 6 HOURS PRN
Status: DISCONTINUED | OUTPATIENT
Start: 2022-09-09 | End: 2022-09-10 | Stop reason: HOSPADM

## 2022-09-09 RX ORDER — ONDANSETRON 2 MG/ML
4 INJECTION INTRAMUSCULAR; INTRAVENOUS EVERY 6 HOURS PRN
Status: DISCONTINUED | OUTPATIENT
Start: 2022-09-09 | End: 2022-09-10 | Stop reason: HOSPADM

## 2022-09-09 RX ORDER — HYDROMORPHONE HCL IN WATER/PF 6 MG/30 ML
0.2 PATIENT CONTROLLED ANALGESIA SYRINGE INTRAVENOUS EVERY 5 MIN PRN
Status: DISCONTINUED | OUTPATIENT
Start: 2022-09-09 | End: 2022-09-10 | Stop reason: HOSPADM

## 2022-09-09 RX ORDER — ACETAMINOPHEN 325 MG/1
975 TABLET ORAL ONCE
Status: COMPLETED | OUTPATIENT
Start: 2022-09-09 | End: 2022-09-09

## 2022-09-09 RX ORDER — LIDOCAINE HYDROCHLORIDE 20 MG/ML
INJECTION, SOLUTION INFILTRATION; PERINEURAL PRN
Status: DISCONTINUED | OUTPATIENT
Start: 2022-09-09 | End: 2022-09-09

## 2022-09-09 RX ORDER — PROCHLORPERAZINE 25 MG
12.5 SUPPOSITORY, RECTAL RECTAL EVERY 12 HOURS PRN
Status: DISCONTINUED | OUTPATIENT
Start: 2022-09-09 | End: 2022-09-10 | Stop reason: HOSPADM

## 2022-09-09 RX ORDER — IODIXANOL 320 MG/ML
100 INJECTION, SOLUTION INTRAVASCULAR ONCE
Status: COMPLETED | OUTPATIENT
Start: 2022-09-09 | End: 2022-09-09

## 2022-09-09 RX ORDER — LIDOCAINE 40 MG/G
CREAM TOPICAL
Status: DISCONTINUED | OUTPATIENT
Start: 2022-09-09 | End: 2022-09-10 | Stop reason: HOSPADM

## 2022-09-09 RX ORDER — NALOXONE HYDROCHLORIDE 0.4 MG/ML
0.4 INJECTION, SOLUTION INTRAMUSCULAR; INTRAVENOUS; SUBCUTANEOUS
Status: DISCONTINUED | OUTPATIENT
Start: 2022-09-09 | End: 2022-09-10 | Stop reason: HOSPADM

## 2022-09-09 RX ORDER — ONDANSETRON 4 MG/1
4 TABLET, ORALLY DISINTEGRATING ORAL EVERY 30 MIN PRN
Status: DISCONTINUED | OUTPATIENT
Start: 2022-09-09 | End: 2022-09-10 | Stop reason: HOSPADM

## 2022-09-09 RX ORDER — GABAPENTIN 300 MG/1
300 CAPSULE ORAL 2 TIMES DAILY
Status: DISCONTINUED | OUTPATIENT
Start: 2022-09-09 | End: 2022-09-10 | Stop reason: HOSPADM

## 2022-09-09 RX ORDER — LABETALOL HYDROCHLORIDE 5 MG/ML
10 INJECTION, SOLUTION INTRAVENOUS
Status: DISCONTINUED | OUTPATIENT
Start: 2022-09-09 | End: 2022-09-10 | Stop reason: HOSPADM

## 2022-09-09 RX ORDER — ONDANSETRON 4 MG/1
4 TABLET, ORALLY DISINTEGRATING ORAL EVERY 6 HOURS PRN
Status: DISCONTINUED | OUTPATIENT
Start: 2022-09-09 | End: 2022-09-10 | Stop reason: HOSPADM

## 2022-09-09 RX ORDER — FENTANYL CITRATE 50 UG/ML
INJECTION, SOLUTION INTRAMUSCULAR; INTRAVENOUS PRN
Status: DISCONTINUED | OUTPATIENT
Start: 2022-09-09 | End: 2022-09-09

## 2022-09-09 RX ORDER — SODIUM CHLORIDE, SODIUM LACTATE, POTASSIUM CHLORIDE, CALCIUM CHLORIDE 600; 310; 30; 20 MG/100ML; MG/100ML; MG/100ML; MG/100ML
INJECTION, SOLUTION INTRAVENOUS CONTINUOUS PRN
Status: DISCONTINUED | OUTPATIENT
Start: 2022-09-09 | End: 2022-09-09

## 2022-09-09 RX ORDER — ONDANSETRON 2 MG/ML
4 INJECTION INTRAMUSCULAR; INTRAVENOUS EVERY 30 MIN PRN
Status: DISCONTINUED | OUTPATIENT
Start: 2022-09-09 | End: 2022-09-10 | Stop reason: HOSPADM

## 2022-09-09 RX ORDER — DEXAMETHASONE SODIUM PHOSPHATE 4 MG/ML
INJECTION, SOLUTION INTRA-ARTICULAR; INTRALESIONAL; INTRAMUSCULAR; INTRAVENOUS; SOFT TISSUE PRN
Status: DISCONTINUED | OUTPATIENT
Start: 2022-09-09 | End: 2022-09-09

## 2022-09-09 RX ORDER — PAROXETINE 30 MG/1
30 TABLET, FILM COATED ORAL EVERY EVENING
Status: DISCONTINUED | OUTPATIENT
Start: 2022-09-09 | End: 2022-09-10 | Stop reason: HOSPADM

## 2022-09-09 RX ORDER — HEPARIN SODIUM 1000 [USP'U]/ML
INJECTION, SOLUTION INTRAVENOUS; SUBCUTANEOUS PRN
Status: DISCONTINUED | OUTPATIENT
Start: 2022-09-09 | End: 2022-09-09

## 2022-09-09 RX ORDER — CEFAZOLIN SODIUM/WATER 2 G/20 ML
SYRINGE (ML) INTRAVENOUS PRN
Status: DISCONTINUED | OUTPATIENT
Start: 2022-09-09 | End: 2022-09-09

## 2022-09-09 RX ORDER — PROPOFOL 10 MG/ML
INJECTION, EMULSION INTRAVENOUS PRN
Status: DISCONTINUED | OUTPATIENT
Start: 2022-09-09 | End: 2022-09-09

## 2022-09-09 RX ADMIN — MIDAZOLAM 2 MG: 1 INJECTION INTRAMUSCULAR; INTRAVENOUS at 12:27

## 2022-09-09 RX ADMIN — Medication 2 G: at 13:24

## 2022-09-09 RX ADMIN — PHENYLEPHRINE HYDROCHLORIDE 100 MCG: 10 INJECTION INTRAVENOUS at 14:43

## 2022-09-09 RX ADMIN — HYDROMORPHONE HYDROCHLORIDE 0.2 MG: 0.2 INJECTION, SOLUTION INTRAMUSCULAR; INTRAVENOUS; SUBCUTANEOUS at 17:22

## 2022-09-09 RX ADMIN — FENTANYL CITRATE 25 MCG: 50 INJECTION, SOLUTION INTRAMUSCULAR; INTRAVENOUS at 16:35

## 2022-09-09 RX ADMIN — FENTANYL CITRATE 25 MCG: 50 INJECTION, SOLUTION INTRAMUSCULAR; INTRAVENOUS at 16:41

## 2022-09-09 RX ADMIN — Medication 20 MG: at 13:47

## 2022-09-09 RX ADMIN — SUGAMMADEX 200 MG: 100 INJECTION, SOLUTION INTRAVENOUS at 15:51

## 2022-09-09 RX ADMIN — HEPARIN SODIUM 5500 UNITS: 1000 INJECTION INTRAVENOUS; SUBCUTANEOUS at 13:58

## 2022-09-09 RX ADMIN — ACETAMINOPHEN 975 MG: 325 TABLET ORAL at 17:08

## 2022-09-09 RX ADMIN — HEPARIN SODIUM 1000 UNITS: 1000 INJECTION INTRAVENOUS; SUBCUTANEOUS at 14:58

## 2022-09-09 RX ADMIN — FENTANYL CITRATE 25 MCG: 50 INJECTION, SOLUTION INTRAMUSCULAR; INTRAVENOUS at 16:45

## 2022-09-09 RX ADMIN — GABAPENTIN 300 MG: 300 CAPSULE ORAL at 16:43

## 2022-09-09 RX ADMIN — HYDROMORPHONE HYDROCHLORIDE 0.2 MG: 0.2 INJECTION, SOLUTION INTRAMUSCULAR; INTRAVENOUS; SUBCUTANEOUS at 16:52

## 2022-09-09 RX ADMIN — Medication 10 MG: at 14:43

## 2022-09-09 RX ADMIN — LIDOCAINE HYDROCHLORIDE 100 MG: 20 INJECTION, SOLUTION INFILTRATION; PERINEURAL at 12:40

## 2022-09-09 RX ADMIN — FENTANYL CITRATE 100 MCG: 50 INJECTION, SOLUTION INTRAMUSCULAR; INTRAVENOUS at 12:40

## 2022-09-09 RX ADMIN — DEXAMETHASONE SODIUM PHOSPHATE 4 MG: 4 INJECTION, SOLUTION INTRA-ARTICULAR; INTRALESIONAL; INTRAMUSCULAR; INTRAVENOUS; SOFT TISSUE at 13:00

## 2022-09-09 RX ADMIN — SODIUM CHLORIDE: 9 INJECTION, SOLUTION INTRAVENOUS at 16:54

## 2022-09-09 RX ADMIN — ONDANSETRON 4 MG: 2 INJECTION INTRAMUSCULAR; INTRAVENOUS at 15:42

## 2022-09-09 RX ADMIN — PROPOFOL 200 MG: 10 INJECTION, EMULSION INTRAVENOUS at 12:40

## 2022-09-09 RX ADMIN — HEPARIN SODIUM 6 BAG: 200 INJECTION, SOLUTION INTRAVENOUS at 14:59

## 2022-09-09 RX ADMIN — SODIUM CHLORIDE, POTASSIUM CHLORIDE, SODIUM LACTATE AND CALCIUM CHLORIDE: 600; 310; 30; 20 INJECTION, SOLUTION INTRAVENOUS at 12:30

## 2022-09-09 RX ADMIN — FENTANYL CITRATE 25 MCG: 50 INJECTION, SOLUTION INTRAMUSCULAR; INTRAVENOUS at 16:49

## 2022-09-09 RX ADMIN — LIDOCAINE HYDROCHLORIDE 10 ML: 10 INJECTION, SOLUTION EPIDURAL; INFILTRATION; INTRACAUDAL; PERINEURAL at 14:59

## 2022-09-09 RX ADMIN — IODIXANOL 150 ML: 320 INJECTION, SOLUTION INTRAVASCULAR at 15:50

## 2022-09-09 RX ADMIN — Medication 50 MG: at 12:40

## 2022-09-09 RX ADMIN — Medication 10 MG: at 15:30

## 2022-09-09 ASSESSMENT — ACTIVITIES OF DAILY LIVING (ADL)
ADLS_ACUITY_SCORE: 22
ADLS_ACUITY_SCORE: 20
ADLS_ACUITY_SCORE: 22

## 2022-09-09 ASSESSMENT — LIFESTYLE VARIABLES: TOBACCO_USE: 0

## 2022-09-09 ASSESSMENT — COPD QUESTIONNAIRES: COPD: 0

## 2022-09-09 NOTE — PROCEDURES
[facility]     Endovascular Surgical Neuroradiology Post-Procedure Note    Pre-Procedure Diagnosis:  L Paraspinal Vascular Malformation  Post-Procedure Diagnosis:  L Paraspinal Vascular Malformation    Procedure(s):   Diagnostic spinal angiography    Findings:    - As above  - Attempted embolization    Plan:    - Right CFA access, Bed Rest for 2 hours. Closed with StarClose    Primary Surgeon:  Dr. Xavier Lawrence  Secondary Surgeon:  Dr. Xavier Lawrence  Secondary Surgeon Review:    Fellow:  Mike Campoverde  Additional Assistants:      Prior to the start of the procedure and with procedural staff participation, I verbally confirmed: the patient s identity using two indicators, relevant allergies, that the procedure was appropriate and matched the consent or emergent situation, and that the correct equipment/implants were available. Immediately prior to starting the procedure I conducted the Time Out with the procedural staff and re-confirmed the patient s name, procedure, and site/side. (The Joint Commission universal protocol was followed.)  Yes    PRU value: Not applicable    Anesthesia:  Performed by Anesthesia  Medications:  Heparin 6500 U  Puncture site:  Right CFA    Fluoroscopy time (minutes):  108  Radiation dose (mGy):  8568  Contrast amount (mL):  180    Estimated blood loss (mL):  50 ml     Closure:  Device Closed with StarClose device    Disposition:  Will be followed in hospital by the Neuro Endovascular team.        Time of sedation in minutes:  120 minutes from beginning to end of physician one to one monitoring.    GALLO CAMPOVERDE MD

## 2022-09-09 NOTE — ANESTHESIA PROCEDURE NOTES
Airway         Procedure Start/Stop Times: 9/9/2022 12:44 PM  Staff -        Anesthesiologist:  Robby Keller MD       Resident/Fellow: David Vásquez Jr., MD       Performed By: residentIndications and Patient Condition       Indications for airway management: soheila-procedural       Induction type:intravenous       Mask difficulty assessment: 1 - vent by mask    Final Airway Details       Final airway type: endotracheal airway       Successful airway: ETT - single and Oral  Endotracheal Airway Details        ETT size (mm): 7.5       Cuffed: yes       Successful intubation technique: video laryngoscopy       VL Blade Size: MAC D Blade       Grade View of Cords: 1       Adjucts: stylet       Position: Right       Measured from: lips       Secured at (cm): 23       Bite block used: None    Post intubation assessment        Placement verified by: capnometry, equal breath sounds and chest rise        Number of attempts at approach: 2       Secured with: pink tape       Ease of procedure: easy       Dentition: Unchanged    Medication(s) Administered   Medication Administration Time: 9/9/2022 12:44 PM

## 2022-09-09 NOTE — ANESTHESIA CARE TRANSFER NOTE
Patient: Mylene Vasquez    Procedure: Procedure(s):  ANESTHESIA OUT OF OR Bilateral Carotid Cerebral Angiogram, Embolization @1200       Diagnosis: Paraspinal mass [R22.2]  Diagnosis Additional Information: No value filed.    Anesthesia Type:   General     Note:    Oropharynx: oropharynx clear of all foreign objects and spontaneously breathing  Level of Consciousness: awake  Oxygen Supplementation: face mask  Level of Supplemental Oxygen (L/min / FiO2): 10  Independent Airway: airway patency satisfactory and stable  Dentition: dentition unchanged  Vital Signs Stable: post-procedure vital signs reviewed and stable  Report to RN Given: handoff report given  Patient transferred to: PACU    Handoff Report: Identifed the Patient, Identified the Reponsible Provider, Reviewed the pertinent medical history, Discussed the surgical course, Reviewed Intra-OP anesthesia mangement and issues during anesthesia, Set expectations for post-procedure period and Allowed opportunity for questions and acknowledgement of understanding      Vitals:  Vitals Value Taken Time   /83 09/09/22 1608   Temp     Pulse 92 09/09/22 1615   Resp 14    SpO2 96 % 09/09/22 1615   Vitals shown include unvalidated device data.    Electronically Signed By: Sasha Dhillon CRNA, APRN CRNA  September 9, 2022  4:16 PM

## 2022-09-09 NOTE — IR NOTE
Spinal angiogram with Dr. Lawrence, accessed RFA , closure device, StarClose. 2 hours bedrest with right leg straight until 1800

## 2022-09-09 NOTE — H&P
[facility]     Endovascular Surgical Neuroradiology Post-Procedure Note    Pre-Procedure Diagnosis:  L Paraspinal Vascular Malformation  Post-Procedure Diagnosis:  L Paraspinal Vascular Malformation    Procedure(s):   Diagnostic spinal angiography    Findings:    - As above  - Attempted embolization    Plan:    - Right CFA access, Bed Rest for 2 hours. Closed with StarClose    Primary Surgeon:  Dr. Xavier Lawrence  Secondary Surgeon:  Dr. Xavier Lawrence  Secondary Surgeon Review:    Fellow:  Mike Campoverde  Additional Assistants:      Prior to the start of the procedure and with procedural staff participation, I verbally confirmed: the patient s identity using two indicators, relevant allergies, that the procedure was appropriate and matched the consent or emergent situation, and that the correct equipment/implants were available. Immediately prior to starting the procedure I conducted the Time Out with the procedural staff and re-confirmed the patient s name, procedure, and site/side. (The Joint Commission universal protocol was followed.)  Yes    PRU value: Not applicable    Anesthesia:  Performed by Anesthesia  Medications:  Heparin 6500 U  Puncture site:  Right CFA    Fluoroscopy time (minutes):  108  Radiation dose (mGy):  8568  Contrast amount (mL):  180    Estimated blood loss (mL):  50 ml     Closure:  Device Closed with StarClose device    Disposition:  Will be followed in hospital by the Neuro Endovascular team.        Time of sedation in minutes:  120 minutes from beginning to end of physician one to one monitoring.    GALLO CAMPOVERDE MD

## 2022-09-09 NOTE — ANESTHESIA POSTPROCEDURE EVALUATION
Patient: Mylene Vasquez    Procedure: Procedure(s):  ANESTHESIA OUT OF OR Bilateral Carotid Cerebral Angiogram, Embolization @1200       Anesthesia Type:  General    Note:  Disposition: Inpatient   Postop Pain Control: Uneventful            Sign Out: Well controlled pain   PONV: No   Neuro/Psych: Uneventful            Sign Out: Acceptable/Baseline neuro status   Airway/Respiratory: Uneventful            Sign Out: Acceptable/Baseline resp. status   CV/Hemodynamics: Uneventful            Sign Out: Acceptable CV status; No obvious hypovolemia; No obvious fluid overload   Other NRE: NONE   DID A NON-ROUTINE EVENT OCCUR? No           Last vitals:  Vitals Value Taken Time   /78 09/09/22 1800   Temp 36.8  C (98.2  F) 09/09/22 1615   Pulse 101 09/09/22 1806   Resp 16 09/09/22 1800   SpO2 95 % 09/09/22 1829   Vitals shown include unvalidated device data.    Electronically Signed By: CONNOR KAUR MD  September 9, 2022  6:29 PM

## 2022-09-09 NOTE — IR NOTE
Procedure: Spinal Angio (intent to embolize avm with sue)    Fluoro time: 108.0 minutes  mGy: (total)8568 ,[A: 3334] [B: 5234]    Providers made aware that 60 minutes of fluoro time was exceeded.  Providers made aware that 6 Gy of radiation was exceeded.    Patient to be periodically checked for burns, both front and back, due to radiation dose received.  Patient was supine for duration of procedure.

## 2022-09-10 VITALS
BODY MASS INDEX: 27.59 KG/M2 | RESPIRATION RATE: 17 BRPM | TEMPERATURE: 96.7 F | WEIGHT: 147.2 LBS | HEART RATE: 99 BPM | OXYGEN SATURATION: 97 % | SYSTOLIC BLOOD PRESSURE: 156 MMHG | DIASTOLIC BLOOD PRESSURE: 86 MMHG

## 2022-09-10 PROCEDURE — 250N000013 HC RX MED GY IP 250 OP 250 PS 637: Performed by: STUDENT IN AN ORGANIZED HEALTH CARE EDUCATION/TRAINING PROGRAM

## 2022-09-10 RX ORDER — CLOMIPRAMINE HYDROCHLORIDE 50 MG/1
150 CAPSULE ORAL AT BEDTIME
Status: DISCONTINUED | OUTPATIENT
Start: 2022-09-10 | End: 2022-09-10 | Stop reason: HOSPADM

## 2022-09-10 RX ORDER — ACETAMINOPHEN 325 MG/1
650 TABLET ORAL EVERY 4 HOURS PRN
Status: DISCONTINUED | OUTPATIENT
Start: 2022-09-10 | End: 2022-09-10 | Stop reason: HOSPADM

## 2022-09-10 RX ADMIN — ACETAMINOPHEN 650 MG: 325 TABLET, FILM COATED ORAL at 07:07

## 2022-09-10 ASSESSMENT — ACTIVITIES OF DAILY LIVING (ADL)
ADLS_ACUITY_SCORE: 22

## 2022-09-10 NOTE — DISCHARGE SUMMARY
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    Brief Hospital Course:   Patient was admitted to the medical floor unit for overnight observation after procedure.  She had an uneventful course. There were no new problems. Patient was tolerating oral diet and was ambulating independently. Her groin access site was dry and intact without any evidence of hematoma or tenderness. She was provided detailed explanation about her treatment and follow-up plan. She was discharged home in stable condition.     Complications: None.     Other problems addressed during this hospitalization:  None    PHYSICAL EXAMINATION  Vital Signs:  B/P: 156/86, T: 96.7, P: 99, R: 17  General: No evidence of skin burns or rash.  Mentation: Awake, alert & oriented x3  Speech: Normal. No dysarthria or aphasia  Cranial nerve exam: Pupils equal, round and reactive to light bilaterally, visual fields full, extraocular movements intact, Facial sensations intact, face symmetric, hearing normal B/L, Shoulder shrug strong B/L, tongue movements intact  Motor: Strength 5/5 throughout  Sensations: Intact B/L to light touch  Coordination: Finger to nose test intact B/L  Gait: Deferred    National Institutes of Health Stroke Scale (on day of discharge)  NIHSS Total Score 0    mRS 0 - No symptoms.      Medications    Current Discharge Medication List      CONTINUE these medications which have NOT CHANGED     Details   clomiPRAMINE (ANAFRANIL) 50 MG capsule every evening Take three tablets at bedtime      PARoxetine (PAXIL) 30 MG tablet Take by mouth every evening      gabapentin (NEURONTIN) 300 MG capsule Take one tab at night for 3 days.  If able to tolerate, take one tab twice daily for 3 days, then take one tab three times daily.  Qty: 90 capsule, Refills: 4    Associated Diagnoses: Chronic right-sided low back pain with right-sided sciatica; Scoliosis of lumbar spine, unspecified scoliosis type; Facet arthropathy, lumbar; Lumbar degenerative disc disease; Spondylolisthesis, lumbar region      VITAMIN D PO Take by mouth every 7 days             Additional recommendations and follow up:       Reason for your hospital stay    You were admitted for a spinal angiogram     Activity    What you may eat or drink after angiogram:  -- There are no changes in what you should eat or drink after this test except that you should drink at least six to eight 8-ounce glasses of fluid each day for 2 days to flush the contrast (dye) out of your body unless you are on a limited fluids diet.    Moving around after angiogram:  -- After your test: Rest 48 hours and follow the special activity instructions listed.    Special activity restrictions:  -- Limit physical activity for 48 hours.  Rest on a sofa or bed as much as possible.  -- No strenuous activity.  -- No long walks.  -- Avoid climbing stairs if possible.  If you must climb stairs, do it slowly.    Lifting:  -- Do not lift more than 10 pounds for 48 hours.   -- Do not lift more than 20 pounds for 7 days. (A full gallon of water weighs about 8 pounds)    Sexual activity:  -- You can resume sexual activity in 2 days.  Bathing/Swimming:  -- You may shower in 24 hours .  -- You may NOT take a tub bath, swim, or sit in water for 5 days.    The groin puncture site:  Care:  -- Keep the area clean and dry except for during daily shower.  -- Clean the site daily using soap and water while  standing in the shower.  Pat dry thoroughly.  -- Cover the groin site with a bandaid until the skin has closed.   -- When you sneeze, cough or laugh, hold pressure on the puncture site.  -- If you must strain when having a bowel movement, hold gentle pressure to the puncture site.    Anesthesia or sedation information:  You have received medication for your procedure that made you sleepy:  -- You may be sleepy, feel less coordinated or feel weak.  To prevent injury, be careful when you walk, bathe, cook or use electrical devices/tools.  -- You may NOT drive or operate mechanical equipment until 24 hours after your procedure.  You also must stop taking narcotic pain medications before driving.  -- A responsible adult should remain with you for at least 24 hours after your procedure.  You should stay at home and rest today.  -- This may cause you to react differently to alcohol.  Do not drink alcohol for at least 24 hours after your procedure.  -- This may cause you to not think clearly.  Do not make important decisions for 24 hours after your procedure.  -- To prevent burns, do not smoke.    Local anesthesia:  -- You had local anesthesia (numbing medicine) for your procedure.  The numbness should go away a few hours after the procedure.    Your medicines:  -- It is important that you take the medicines on your list.  Work with your health care provider or pharmacist if you have questions about your medicine.    Return to work:  -- You can return to work in 24 hours if your work does not stop you from following your care instructions (such as lifting).  If your work does not allow you to follow the instructions, you should return to work in 48 hours.     Adult [facility] Follow-up and recommended labs and tests    We will establish a 1 month appointment for you to return for treatment of the spinal vascular malformation.     Appointments on [place] and/or [facility] (with [facility] or [facility] provider or service). Call  [phone] if you haven't heard regarding these appointments within 7 days of discharge.     Diet    Follow this diet upon discharge: Regular       Patient was discussed with the attending physician, Dr. Lawrence.    Yves Campoverde MD  ESN fellow  Pager: [phone]

## 2022-09-10 NOTE — PLAN OF CARE
BP (!) 152/95 (BP Location: Left arm)   Pulse 106   Temp 98.6  F (37  C) (Oral)   Resp 16   Wt 66.8 kg (147 lb 3.2 oz)   LMP 03/26/2007   SpO2 97%   BMI 27.59 kg/m        Shift: 7645-3006  Status: Paraspinal mass-bilateral carotid cerebral angiogram embolization, spinal angiography(9/9/22)-POD #1.   Neuro: A&O x 4. Denies numbness and tingling. Neuro's intact.   Resp: WDL. RA.   Cardiac: Hypertensive and tachycardic. Denies cardiac chest pain.   GI/: Regular diet. Voiding spontaneously.   Skin: Warm and dry.   Activity: Up ad colin.  Incision & Drainage: PIV (2) SL.   Pain: 0/10. Reported slight headache, declined pain medication.   Labs: Reviewed.   Changes: No acute changes.   Plan: Continue with POC. Possible discharge today.       Goal Outcome Evaluation: Ongoing progressing.     Plan of Care Reviewed With: patient     Overall Patient Progress: no change

## 2022-09-10 NOTE — PLAN OF CARE
Patient verbalized understanding of discharge educaton. Peripheral IV removed and patient taken to lobby via transport.

## 2022-09-10 NOTE — PROGRESS NOTES
Admitted/transferred from: PACU at 0300  2 RN full   skin assessment completed by Eden Braxton, JENNIFER and Delia PRINCE RN.  Skin assessment finding: issues found Right femoral embolization site.    Interventions/actions: other None   Bedside Emergency Equipment Present:  Suction Regulator: Yes  Suction Canister: Yes  Tubing between Regulator and Canister: Yes  O2 Regulator with Tree: Yes  Ambu Bag: Yes

## 2022-09-12 ENCOUNTER — TELEPHONE (OUTPATIENT)
Dept: NEUROLOGY | Facility: CLINIC | Age: 67
End: 2022-09-12

## 2022-09-12 DIAGNOSIS — Q28.8 SPINAL ARTERIOVENOUS MALFORMATION: Primary | ICD-10-CM

## 2022-09-12 DIAGNOSIS — R22.2 PARASPINAL MASS: ICD-10-CM

## 2022-09-12 DIAGNOSIS — I99.8 OTHER DISORDER OF CIRCULATORY SYSTEM: ICD-10-CM

## 2022-09-12 NOTE — TELEPHONE ENCOUNTER
----- Message from Yves Campoverde MD sent at 9/10/2022 10:22 AM CDT -----  [facility],    Please schedule this patient for a trans-radial spinal angiogram for Para-spinal AVM embolization under [facility] with Dr Lawrence. Will need to be in 4A after treatment. We should plan to bring her in after next 4 weeks.      Thanks,  Yves

## 2022-09-13 ENCOUNTER — TELEPHONE (OUTPATIENT)
Dept: RADIOLOGY | Facility: CLINIC | Age: 67
End: 2022-09-13

## 2022-09-13 ENCOUNTER — TELEPHONE (OUTPATIENT)
Dept: NEUROSURGERY | Facility: CLINIC | Age: 67
End: 2022-09-13

## 2022-09-13 ENCOUNTER — HOSPITAL ENCOUNTER (INPATIENT)
Facility: CLINIC | Age: 67
Setting detail: SURGERY ADMIT
End: 2022-09-13
Payer: COMMERCIAL

## 2022-09-13 NOTE — TELEPHONE ENCOUNTER
Called patient to schedule Procedure with     Date of Procedure: 10/18    Location of Procedure: [facility]    Pre-Op H&P: PCP    Imaging Scheduled:  No    Scheduled COVID-19 Testing and Labs: Yes    Additional comments: Patient is aware of above dates, she will await further instruction from RN and call with any questions. Patient would like a call from RN or  to discuss previous surgery and what was completed.     Anna C. Schoenecker on 9/13/2022 at 9:32 AM

## 2022-09-16 ENCOUNTER — PATIENT OUTREACH (OUTPATIENT)
Dept: PLASTIC SURGERY | Facility: CLINIC | Age: 67
End: 2022-09-16

## 2022-09-16 NOTE — TELEPHONE ENCOUNTER
Left message for patient to return call to [phone].    Per Dr Alcantar and Dr Goldstein's discussion, pt should have a consult with Dr Goldstein.

## 2022-09-19 NOTE — TELEPHONE ENCOUNTER
I spoke with Mylene, she would like to speak with Dr Alcantar about this. She states she was not aware of being referred to plastics and wants to discuss before scheduling.     I told her I would call her back if we are still needed after that discussion.

## 2022-09-20 ENCOUNTER — VIRTUAL VISIT (OUTPATIENT)
Dept: NEUROSURGERY | Facility: CLINIC | Age: 67
End: 2022-09-20
Payer: COMMERCIAL

## 2022-09-20 DIAGNOSIS — Q28.8 SPINAL VASCULAR MALFORMATION: Primary | ICD-10-CM

## 2022-09-20 PROCEDURE — 99207 PR NO CHARGE LOS: CPT | Performed by: NEUROLOGICAL SURGERY

## 2022-09-20 NOTE — PATIENT INSTRUCTIONS
Consultation with Dr. Goldstein   Follow up with Dr. Lawrence after seeing Dr Goldstein      If you have any questions please contact me at [phone], option 3.    Tania Farias, RN, CNRN, SCRN / Satnam Murray RODRIGUEZ  Stroke & Endovascular Care Coordinator    Thank you for choosing [facility] for your health care needs.

## 2022-09-20 NOTE — PROGRESS NOTES
Telephone visit staffed with Dr. Esparza.    History of large left paraspinal vascular lesion. Diagnostic angiogram performed 9/9/2022 demonstrated predominant arterial supply from T10 segmental artery and drainage via azygos system.     Pt has been doing well since procedure, no concerns. Pt has questions about next steps.     Plan:  - Consultation with Dr. Goldstein from plastic surgery for consideration of surgical management  - Depending on discussion with Dr. Goldstein, possible preoperative embolization of lesion.     Yadiel Vasquez MD    I have reviewed the history and agree with the fellow's assessment and plan. I did not speak to the patient.  BARNEY Esparza MD

## 2022-09-20 NOTE — LETTER
Date:November 22, 2022      Provider requested that no letter be sent. Do not send.       [facility]

## 2022-09-20 NOTE — PROGRESS NOTES
Mylene is a 67 year old who is being evaluated via a billable telephone visit.      What phone number would you like to be contacted at? [phone]   How would you like to obtain your AVS? Amina GAO    Phone call duration: 15 minutes   [Current] : current [Never] : never [TextBox_4] : Patient is a 75-year-old male with past medical history significant for hypertension, high cholesterol, GERD, moderate persistent asthma who presents today for follow-up and continuation of his Xolair therapy

## 2022-09-20 NOTE — LETTER
9/20/2022       RE: Mylene Vasquez  [address] [place] [address]     Dear Colleague,    Thank you for referring your patient, Mylene Vasquez, to the [facility]. Please see a copy of my visit note below.    Mylene is a 67 year old who is being evaluated via a billable telephone visit.      What phone number would you like to be contacted at? [phone]   How would you like to obtain your AVS? Amina Lorenzo VF    Phone call duration: 15 minutes    Telephone visit staffed with Dr. Esparza.    History of large left paraspinal vascular lesion. Diagnostic angiogram performed 9/9/2022 demonstrated predominant arterial supply from T10 segmental artery and drainage via azygos system.     Pt has been doing well since procedure, no concerns. Pt has questions about next steps.     Plan:  - Consultation with Dr. Goldstein from plastic surgery for consideration of surgical management  - Depending on discussion with Dr. Goldstein, possible preoperative embolization of lesion.     Yadiel Vasquez MD    I have reviewed the history and agree with the fellow's assessment and plan. I did not speak to the patient.  BARNEY Esparza MD        Again, thank you for allowing me to participate in the care of your patient.      Sincerely,    Montserrat Esparza MD

## 2022-09-22 ENCOUNTER — TELEPHONE (OUTPATIENT)
Dept: NEUROSURGERY | Facility: CLINIC | Age: 67
End: 2022-09-22

## 2022-09-22 ENCOUNTER — TELEPHONE (OUTPATIENT)
Dept: NEUROLOGY | Facility: CLINIC | Age: 67
End: 2022-09-22

## 2022-09-22 NOTE — TELEPHONE ENCOUNTER
"Per Fellow, Dr. Mckeon, patient will need to see Dr. Goldstein to sort out what the lesion is and work with them for percutaneous embo and resection in the same setting.     Patient currently scheduled for embo 10/18/22.     Spoke with patient. Informed of the need to schedule appointment with Dr. Goldstein. States she is \"dealing with a hundred people.\" Feeling frustrated.     RN will work on getting patient an appointment. Patient verbalized understanding. Tania Farias RN 9/22/2022 11:06 AM          "

## 2022-09-23 NOTE — TELEPHONE ENCOUNTER
FUTURE VISIT INFORMATION      FUTURE VISIT INFORMATION:    Date: 9/28/22    Time: 9:15am    [place] [facility] [place]ERRAL INFORMATION:   Referring provider:  Montserrat Esparza MD    Referring providers clinic:  [facility]    Reason for visit/diagnosis  consult paraspinal mass    RECORDS REQUESTED FROM:       Clinic name Comments Records Status Imaging Status   [facility] OV/referral 9/20/22 EPIC

## 2022-09-26 SDOH — HEALTH STABILITY: PHYSICAL HEALTH: ON AVERAGE, HOW MANY DAYS PER WEEK DO YOU ENGAGE IN MODERATE TO STRENUOUS EXERCISE (LIKE A BRISK WALK)?: 5 DAYS

## 2022-09-26 SDOH — HEALTH STABILITY: PHYSICAL HEALTH: ON AVERAGE, HOW MANY MINUTES DO YOU ENGAGE IN EXERCISE AT THIS LEVEL?: 20 MIN

## 2022-09-26 SDOH — ECONOMIC STABILITY: INCOME INSECURITY: IN THE LAST 12 MONTHS, WAS THERE A TIME WHEN YOU WERE NOT ABLE TO PAY THE MORTGAGE OR RENT ON TIME?: NO

## 2022-09-26 SDOH — ECONOMIC STABILITY: INCOME INSECURITY: HOW HARD IS IT FOR YOU TO PAY FOR THE VERY BASICS LIKE FOOD, HOUSING, MEDICAL CARE, AND HEATING?: NOT VERY HARD

## 2022-09-26 SDOH — ECONOMIC STABILITY: FOOD INSECURITY: WITHIN THE PAST 12 MONTHS, THE FOOD YOU BOUGHT JUST DIDN'T LAST AND YOU DIDN'T HAVE MONEY TO GET MORE.: NEVER TRUE

## 2022-09-26 SDOH — ECONOMIC STABILITY: TRANSPORTATION INSECURITY
IN THE PAST 12 MONTHS, HAS THE LACK OF TRANSPORTATION KEPT YOU FROM MEDICAL APPOINTMENTS OR FROM GETTING MEDICATIONS?: NO

## 2022-09-26 SDOH — ECONOMIC STABILITY: TRANSPORTATION INSECURITY
IN THE PAST 12 MONTHS, HAS LACK OF TRANSPORTATION KEPT YOU FROM MEETINGS, WORK, OR FROM GETTING THINGS NEEDED FOR DAILY LIVING?: NO

## 2022-09-26 SDOH — ECONOMIC STABILITY: FOOD INSECURITY: WITHIN THE PAST 12 MONTHS, YOU WORRIED THAT YOUR FOOD WOULD RUN OUT BEFORE YOU GOT MONEY TO BUY MORE.: NEVER TRUE

## 2022-09-26 ASSESSMENT — SOCIAL DETERMINANTS OF HEALTH (SDOH)
HOW OFTEN DO YOU GET TOGETHER WITH FRIENDS OR RELATIVES?: THREE TIMES A WEEK
IN A TYPICAL WEEK, HOW MANY TIMES DO YOU TALK ON THE PHONE WITH FAMILY, FRIENDS, OR NEIGHBORS?: MORE THAN THREE TIMES A WEEK
HOW OFTEN DO YOU ATTEND CHURCH OR RELIGIOUS SERVICES?: MORE THAN 4 TIMES PER YEAR
DO YOU BELONG TO ANY CLUBS OR ORGANIZATIONS SUCH AS CHURCH GROUPS UNIONS, FRATERNAL OR ATHLETIC GROUPS, OR SCHOOL GROUPS?: YES

## 2022-09-26 ASSESSMENT — LIFESTYLE VARIABLES
AUDIT-C TOTAL SCORE: 1
SKIP TO QUESTIONS 9-10: 1
HOW MANY STANDARD DRINKS CONTAINING ALCOHOL DO YOU HAVE ON A TYPICAL DAY: PATIENT DOES NOT DRINK
HOW OFTEN DO YOU HAVE SIX OR MORE DRINKS ON ONE OCCASION: NEVER
HOW OFTEN DO YOU HAVE A DRINK CONTAINING ALCOHOL: MONTHLY OR LESS

## 2022-09-27 ENCOUNTER — TELEPHONE (OUTPATIENT)
Dept: ORTHOPEDICS | Facility: CLINIC | Age: 67
End: 2022-09-27

## 2022-09-27 ENCOUNTER — OFFICE VISIT (OUTPATIENT)
Dept: FAMILY MEDICINE | Facility: CLINIC | Age: 67
End: 2022-09-27
Payer: COMMERCIAL

## 2022-09-27 VITALS
BODY MASS INDEX: 28.89 KG/M2 | TEMPERATURE: 98.8 F | RESPIRATION RATE: 18 BRPM | HEIGHT: 61 IN | SYSTOLIC BLOOD PRESSURE: 112 MMHG | OXYGEN SATURATION: 97 % | DIASTOLIC BLOOD PRESSURE: 84 MMHG | WEIGHT: 153 LBS | HEART RATE: 99 BPM

## 2022-09-27 DIAGNOSIS — M47.816 FACET ARTHROPATHY, LUMBAR: ICD-10-CM

## 2022-09-27 DIAGNOSIS — M54.41 CHRONIC RIGHT-SIDED LOW BACK PAIN WITH RIGHT-SIDED SCIATICA: ICD-10-CM

## 2022-09-27 DIAGNOSIS — Z01.818 PREOP GENERAL PHYSICAL EXAM: Primary | ICD-10-CM

## 2022-09-27 DIAGNOSIS — M43.16 SPONDYLOLISTHESIS, LUMBAR REGION: ICD-10-CM

## 2022-09-27 DIAGNOSIS — M54.17 LUMBOSACRAL RADICULOPATHY: Primary | ICD-10-CM

## 2022-09-27 DIAGNOSIS — M41.9 SCOLIOSIS OF LUMBAR SPINE, UNSPECIFIED SCOLIOSIS TYPE: ICD-10-CM

## 2022-09-27 DIAGNOSIS — G89.29 CHRONIC RIGHT-SIDED LOW BACK PAIN WITH RIGHT-SIDED SCIATICA: ICD-10-CM

## 2022-09-27 DIAGNOSIS — M51.369 LUMBAR DEGENERATIVE DISC DISEASE: ICD-10-CM

## 2022-09-27 LAB
ERYTHROCYTE [DISTWIDTH] IN BLOOD BY AUTOMATED COUNT: 13.8 % (ref 10–15)
HCT VFR BLD AUTO: 39.4 % (ref 35–47)
HGB BLD-MCNC: 13 G/DL (ref 11.7–15.7)
MCH RBC QN AUTO: 28.8 PG (ref 26.5–33)
MCHC RBC AUTO-ENTMCNC: 33 G/DL (ref 31.5–36.5)
MCV RBC AUTO: 87 FL (ref 78–100)
PLATELET # BLD AUTO: 336 10E3/UL (ref 150–450)
RBC # BLD AUTO: 4.51 10E6/UL (ref 3.8–5.2)
WBC # BLD AUTO: 4.9 10E3/UL (ref 4–11)

## 2022-09-27 PROCEDURE — 80048 BASIC METABOLIC PNL TOTAL CA: CPT | Performed by: FAMILY MEDICINE

## 2022-09-27 PROCEDURE — 99214 OFFICE O/P EST MOD 30 MIN: CPT | Performed by: FAMILY MEDICINE

## 2022-09-27 PROCEDURE — 85027 COMPLETE CBC AUTOMATED: CPT | Performed by: FAMILY MEDICINE

## 2022-09-27 PROCEDURE — 36415 COLL VENOUS BLD VENIPUNCTURE: CPT | Performed by: FAMILY MEDICINE

## 2022-09-27 RX ORDER — GABAPENTIN 300 MG/1
CAPSULE ORAL
Qty: 90 CAPSULE | Refills: 4
Start: 2022-09-27 | End: 2023-10-02

## 2022-09-27 NOTE — TELEPHONE ENCOUNTER
Pt called and is trying to schedule appointment for cortisone inj for her low back pack. Please reach out to patient to schedule appt.

## 2022-09-27 NOTE — PATIENT INSTRUCTIONS
Preparing for Your Surgery  Getting started  A nurse will call you to review your health history and instructions. They will give you an arrival time based on your scheduled surgery time. Please be ready to share:    Your doctor's clinic name and phone number    Your medical, surgical and anesthesia history    A list of allergies and sensitivities    A list of medicines, including herbal treatments and over-the-counter drugs    Whether the patient has a legal guardian (ask how to send us the papers in advance)  Please tell us if you're pregnant--or if there's any chance you might be pregnant. Some surgeries may injure a fetus (unborn baby), so they require a pregnancy test. Surgeries that are safe for a fetus don't always need a test, and you can choose whether to have one.   If you have a child who's having surgery, please ask for a copy of Preparing for Your Child's Surgery.    Preparing for surgery    Within 10 to 30 days of surgery: Have a pre-op exam (sometimes called an H&P, or History and Physical). This can be done at a clinic or pre-operative center.  ? If you're having a , you may not need this exam. Talk to your care team.    At your pre-op exam, talk to your care team about all medicines you take. If you need to stop any medicines before surgery, ask when to start taking them again.  ? We do this for your safety. Many medicines can make you bleed too much during surgery. Some change how well surgery (anesthesia) drugs work.    Call your insurance company to let them know you're having surgery. (If you don't have insurance, call [phone].)    Call your clinic if there's any change in your health. This includes signs of a cold or flu (sore throat, runny nose, cough, rash, fever). It also includes a scrape or scratch near the surgery site.    If you have questions on the day of surgery, call your hospital or surgery center.  COVID testing  You may need to be tested for COVID-19 before having  surgery. If so, we will give you instructions (or click here).  Eating and drinking guidelines  For your safety: Unless your surgeon tells you otherwise, follow the guidelines below.    Eat and drink as usual until 8 hours before surgery. After that, no food or milk.    Drink clear liquids until 2 hours before surgery. These are liquids you can see through, like water, Gatorade and Propel Water. You may also have black coffee and tea (no cream or milk).    Nothing by mouth within 2 hours of surgery. This includes gum, candy and breath mints.    If you drink alcohol: Stop drinking it the night before surgery.    If your care team tells you to take medicine on the morning of surgery, it's okay to take it with a sip of water.  Preventing infection    Shower or bathe the night before and morning of your surgery. Follow the instructions your clinic gave you. (If no instructions, use regular soap.)    Don't shave or clip hair near your surgery site. We'll remove the hair if needed.    Don't smoke or vape the morning of surgery. You may chew nicotine gum up to 2 hours before surgery. A nicotine patch is okay.  ? Note: Some surgeries require you to completely quit smoking and nicotine. Check with your surgeon.    Your care team will make every effort to keep you safe from infection. We will:  ? Clean our hands often with soap and water (or an alcohol-based hand rub).  ? Clean the skin at your surgery site with a special soap that kills germs.  ? Give you a special gown to keep you warm. (Cold raises the risk of infection.)  ? Wear special hair covers, masks, gowns and gloves during surgery.  ? Give antibiotic medicine, if prescribed. Not all surgeries need antibiotics.  What to bring on the day of surgery    Photo ID and insurance card    Copy of your health care directive, if you have one    Glasses and hearing aides (bring cases)  ? You can't wear contacts during surgery    Inhaler and eye drops, if you use them (tell us  about these when you arrive)    CPAP machine or breathing device, if you use them    A few personal items, if spending the night    If you have . . .  ? A pacemaker, ICD (cardiac defibrillator) or other implant: Bring the ID card.  ? An implanted stimulator: Bring the remote control.  ? A legal guardian: Bring a copy of the certified (court-stamped) guardianship papers.  Please remove any jewelry, including body piercings. Leave jewelry and other valuables at home.  If you're going home the day of surgery    You must have a responsible adult drive you home. They should stay with you overnight as well.    If you don't have someone to stay with you, and you aren't safe to go home alone, we may keep you overnight. Insurance often won't pay for this.  After surgery  If it's hard to control your pain or you need more pain medicine, please call your surgeon's office.  Questions?   If you have any questions for your care team, list them here: _________________________________________________________________________________________________________________________________________________________________________ ____________________________________ ____________________________________ ____________________________________  For informational purposes only. Not to replace the advice of your health care provider. Copyright   2003, 2019 [facility]. All rights reserved. Clinically reviewed by Shona Deleon MD. [employer] [id number] - REV 07/22.

## 2022-09-27 NOTE — PROGRESS NOTES
I called and spoke with Ms. Mylene Vasquez.  We will get her scheduled for left L5-S1 and S1-S2 transforaminal epidural corticosteroid injections for lumbosacral radiculopathy.    Laurent Nielson MD

## 2022-09-27 NOTE — PROGRESS NOTES
[facility]  [address] [address]  Phone: [phone]  Primary Provider: No Ref-Primary, Physician  Pre-op Performing Provider: DARELL SQUIRES      PREOPERATIVE EVALUATION:  Today's date: 9/27/2022    Mylene Vasquez is a 67 year old female who presents for a preoperative evaluation.    Surgical Information:  Surgery/Procedure: Angiogram/embolization   Surgery Location: [facility]   Surgeon: Xavier Lawrence MD   Surgery Date: 10/18/2022  Time of Surgery: 12:00pm   Where patient plans to recover: At home with family  Fax number for surgical facility: Note does not need to be faxed, will be available electronically in Epic.    Type of Anesthesia Anticipated: General    Assessment & Plan     The proposed surgical procedure is considered LOW risk.    Preop general physical exam  FIT FOR SURGERY   - CBC with platelets  - Basic metabolic panel  (Ca, Cl, CO2, Creat, Gluc, K, Na, BUN)    Chronic right-sided low back pain with right-sided sciatica    - gabapentin (NEURONTIN) 300 MG capsule  Dispense: 90 capsule; Refill: 4    Scoliosis of lumbar spine, unspecified scoliosis type    - gabapentin (NEURONTIN) 300 MG capsule  Dispense: 90 capsule; Refill: 4    Facet arthropathy, lumbar    - gabapentin (NEURONTIN) 300 MG capsule  Dispense: 90 capsule; Refill: 4    Lumbar degenerative disc disease    - gabapentin (NEURONTIN) 300 MG capsule  Dispense: 90 capsule; Refill: 4    Spondylolisthesis, lumbar region    - gabapentin (NEURONTIN) 300 MG capsule  Dispense: 90 capsule; Refill: 4           Risks and Recommendations:  The patient has the following additional risks and recommendations for perioperative complications:   - No identified additional risk factors other than previously addressed    Medication Instructions:  Patient is to take all scheduled medications on the day of surgery    RECOMMENDATION:  APPROVAL GIVEN to  proceed with proposed procedure, without further diagnostic evaluation.        24 minutes spent on the date of the encounter doing chart review, review of outside records, review of test results, interpretation of tests, patient visit and documentation         Subjective     HPI related to upcoming procedure: Ms. 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 The appearance favors a high flow vascular malformation, although other neoplasm is less likely. She now presents for the above procedure.    She had surgery on 9/9/22 and they could not achieve the embolization.    This is another try through another entry point in the left wrist.     Preop Questions 9/26/2022   1. Have you ever had a heart attack or stroke? No   2. Have you ever had surgery on your heart or blood vessels, such as a stent placement, a coronary artery bypass, or surgery on an artery in your head, neck, heart, or legs? UNKNOWN - earlier this month   3. Do you have chest pain with activity? No   4. Do you have a history of  heart failure? No   5. Do you currently have a cold, bronchitis or symptoms of other infection? No   6. Do you have a cough, shortness of breath, or wheezing? No   7. Do you or anyone in your family have previous history of blood clots? No   8. Do you or does anyone in your family have a serious bleeding problem such as prolonged bleeding following surgeries or cuts? No   9. Have you ever had problems with anemia or been told to take iron pills? No   10. Have you had any abnormal blood loss such as black, tarry or bloody stools, or abnormal vaginal bleeding? No   11. Have you ever had a blood transfusion? No   12. Are you willing to have a blood transfusion if it is medically needed before, during, or after your surgery? YES   13. Have you or any of your relatives ever had problems with anesthesia? No   14. Do you have sleep apnea, excessive  snoring or daytime drowsiness? No   15. Do you have any artifical heart valves or other implanted medical devices like a pacemaker, defibrillator, or continuous glucose monitor? No   16. Do you have artificial joints? No   17. Are you allergic to latex? No       Health Care Directive:  Patient does not have a Health Care Directive or Living Will: Discussed advance care planning with patient; however, patient declined at this time.    Preoperative Review of :   reviewed - controlled substances prescribed by other outside provider(s). 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       SOCIAL HISTORY:  Social History     Tobacco Use     Smoking status: Never Smoker     Smokeless tobacco: Never Used   Substance Use Topics     Alcohol use: Not Currently     Comment: very little       Family History   Problem Relation Age of Onset     Osteoporosis Mother      Hypertension Mother      Depression Mother      Anxiety Disorder Mother      Alcohol/Drug Father      Cancer Father         lung cancer     Lung Cancer Father      Other Cancer Father         Lung cancer from smoking     Anxiety Disorder Sister      Anxiety Disorder Sister      Colon Cancer No family hx of      Anesthesia Reaction No family hx of       Deep Vein Thrombosis (DVT) No family hx of      Cardiovascular No family hx of          Review of Systems  Constitutional, neuro, ENT, endocrine, pulmonary, cardiac, gastrointestinal, genitourinary, musculoskeletal, integument and psychiatric systems are negative, except as otherwise noted.    Patient Active Problem List    Diagnosis Date Noted     Paraspinal mass 04/25/2022     Priority: Medium     Facet arthropathy, lumbosacral 04/08/2022     Priority: Medium     Lumbosacral radiculopathy 04/08/2022     Priority: Medium     Anxiety      Priority: Medium     Callus of foot 11/08/2015     Priority: Medium     DDD (degenerative disc disease), lumbosacral 11/08/2015     Priority: Medium     ACP (advance care planning) 03/26/2014     Priority: Medium     Advance Care Planning:   ACP Review and Resources Provided:  Reviewed chart for advance care plan.  Mylene Vasquez has no plan or code status on file. Discussed available resources and provided with information. Confirmed code status reflects current choices pending further ACP discussions.  Confirmed/documented designated decision maker(s). See permanent comments section of demographics in clinical tab.   Added by Vibha Mcdowell on 3/26/2014        Diagnosis updated by automated process. Provider to review and confirm.       Health Care Home 03/26/2014     Priority: Medium     State Tier Level:  Tier 0  Status:  NA  Care Coordinator:      See Letters for [facility] Care Plan  Date:  August 12, 2014           Generalized anxiety disorder 03/26/2014     Priority: Medium     Diagnosis updated by automated process. Provider to review and confirm.        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  If able to tolerate, take one tab twice daily for 3 days, then take one tab three times daily. (Patient taking differently: as needed Take one tab at night for 3 days.  If able to tolerate, take one tab twice daily for 3 days, then take one tab three times daily.) 90 capsule 4     PARoxetine (PAXIL) 30 MG tablet Take by mouth every evening       VITAMIN D PO Take by mouth every 7 days         Allergies   Allergen Reactions     Bactrim [Sulfamethoxazole W/Trimethoprim] Nausea and Vomiting and Diarrhea     Pcn [Penicillins]      itchy        Social History     Tobacco Use     Smoking status: Never Smoker     Smokeless tobacco: Never Used   Substance Use Topics     Alcohol use: Not Currently     Comment: very little     Family History   Problem Relation Age of Onset     Osteoporosis Mother      Hypertension Mother      Depression Mother      Anxiety Disorder Mother      Alcohol/Drug Father      Lung Cancer Father         Lung cancer from smoking     Anxiety Disorder Sister      Anxiety Disorder Sister      Colon Cancer No family hx of      Anesthesia Reaction No family hx of      Deep Vein Thrombosis (DVT) No family hx of      Cardiovascular No family hx of      History   Drug Use Unknown         Objective     /84 (BP Location: Right arm, Patient Position: Sitting, Cuff Size: Adult Regular)   Pulse 99   Temp 98.8  F (37.1  C) (Oral)   Resp 18   Ht 1.556 m (5' 1.25\")   Wt 69.4 kg (153 lb)   LMP 03/26/2007   SpO2 97%   BMI 28.67 kg/m      Physical Exam    GENERAL APPEARANCE: healthy, alert and no distress     EYES: EOMI, PERRL     HENT: ear canals and TM's normal and " nose and mouth without ulcers or lesions     NECK: no adenopathy, no asymmetry, masses, or scars and thyroid normal to palpation     RESP: lungs clear to auscultation - no rales, rhonchi or wheezes     CV: regular rates and rhythm, normal S1 S2, no S3 or S4 and no murmur, click or rub     ABDOMEN:  soft, nontender, no HSM or masses and bowel sounds normal     MS: extremities normal- no gross deformities noted, no evidence of inflammation in joints, FROM in all extremities.     SKIN: no suspicious lesions or rashes     NEURO: Normal strength and tone, sensory exam grossly normal, mentation intact and speech normal     PSYCH: mentation appears normal. and affect normal/bright     LYMPHATICS: No cervical adenopathy    Recent Labs   Lab Test 09/06/22  1111 08/15/22  1030   HGB 13.5 13.0    390   INR 0.96  --    * 132*   POTASSIUM 3.8 4.7   CR 0.68 0.70   A1C  --  5.7*        Diagnostics:  Labs pending at this time.  Results will be reviewed when available.   No EKG required, no history of coronary heart disease, significant arrhythmia, peripheral arterial disease or other structural heart disease.    Revised Cardiac Risk Index (RCRI):  The patient has the following serious cardiovascular risks for perioperative complications:   - No serious cardiac risks = 0 points     RCRI Interpretation: 0 points: Class I (very low risk - 0.4% complication rate)           Signed Electronically by: Lana Hernandez MD  Copy of this evaluation report is provided to requesting physician.

## 2022-09-28 ENCOUNTER — PRE VISIT (OUTPATIENT)
Dept: PLASTIC SURGERY | Facility: CLINIC | Age: 67
End: 2022-09-28

## 2022-09-28 ENCOUNTER — LAB (OUTPATIENT)
Dept: LAB | Facility: CLINIC | Age: 67
End: 2022-09-28

## 2022-09-28 ENCOUNTER — HOSPITAL ENCOUNTER (OUTPATIENT)
Facility: AMBULATORY SURGERY CENTER | Age: 67
Discharge: HOME | End: 2022-09-28
Attending: PHYSICAL MEDICINE & REHABILITATION | Admitting: PHYSICAL MEDICINE & REHABILITATION
Payer: COMMERCIAL

## 2022-09-28 ENCOUNTER — OFFICE VISIT (OUTPATIENT)
Dept: PLASTIC SURGERY | Facility: CLINIC | Age: 67
End: 2022-09-28
Payer: COMMERCIAL

## 2022-09-28 VITALS
OXYGEN SATURATION: 98 % | HEART RATE: 89 BPM | BODY MASS INDEX: 28.71 KG/M2 | WEIGHT: 156 LBS | HEIGHT: 62 IN | TEMPERATURE: 98.3 F | RESPIRATION RATE: 14 BRPM | DIASTOLIC BLOOD PRESSURE: 91 MMHG | SYSTOLIC BLOOD PRESSURE: 151 MMHG

## 2022-09-28 VITALS
BODY MASS INDEX: 28.85 KG/M2 | DIASTOLIC BLOOD PRESSURE: 76 MMHG | HEART RATE: 106 BPM | SYSTOLIC BLOOD PRESSURE: 128 MMHG | WEIGHT: 156.8 LBS | OXYGEN SATURATION: 98 % | HEIGHT: 62 IN

## 2022-09-28 DIAGNOSIS — R22.2 PARASPINAL MASS: Primary | ICD-10-CM

## 2022-09-28 DIAGNOSIS — Z20.822 ENCOUNTER FOR LABORATORY TESTING FOR COVID-19 VIRUS: ICD-10-CM

## 2022-09-28 DIAGNOSIS — M54.17 LUMBOSACRAL RADICULOPATHY: Primary | ICD-10-CM

## 2022-09-28 LAB
ANION GAP SERPL CALCULATED.3IONS-SCNC: 7 MMOL/L (ref 3–14)
BUN SERPL-MCNC: 13 MG/DL (ref 7–30)
CALCIUM SERPL-MCNC: 9.2 MG/DL (ref 8.5–10.1)
CHLORIDE BLD-SCNC: 102 MMOL/L (ref 94–109)
CO2 SERPL-SCNC: 28 MMOL/L (ref 20–32)
CREAT SERPL-MCNC: 0.65 MG/DL (ref 0.52–1.04)
GFR SERPL CREATININE-BSD FRML MDRD: >90 ML/MIN/1.73M2
GLUCOSE BLD-MCNC: 75 MG/DL (ref 70–99)
POTASSIUM BLD-SCNC: 5.2 MMOL/L (ref 3.4–5.3)
SODIUM SERPL-SCNC: 137 MMOL/L (ref 133–144)

## 2022-09-28 PROCEDURE — 99204 OFFICE O/P NEW MOD 45 MIN: CPT | Performed by: PLASTIC SURGERY

## 2022-09-28 PROCEDURE — 64483 NJX AA&/STRD TFRM EPI L/S 1: CPT | Mod: LT

## 2022-09-28 PROCEDURE — 64484 NJX AA&/STRD TFRM EPI L/S EA: CPT | Mod: LT

## 2022-09-28 RX ORDER — LIDOCAINE HYDROCHLORIDE 10 MG/ML
INJECTION, SOLUTION EPIDURAL; INFILTRATION; INTRACAUDAL; PERINEURAL PRN
Status: DISCONTINUED | OUTPATIENT
Start: 2022-09-28 | End: 2022-09-28 | Stop reason: HOSPADM

## 2022-09-28 RX ORDER — DEXAMETHASONE SODIUM PHOSPHATE 10 MG/ML
INJECTION INTRAMUSCULAR; INTRAVENOUS PRN
Status: DISCONTINUED | OUTPATIENT
Start: 2022-09-28 | End: 2022-09-28 | Stop reason: HOSPADM

## 2022-09-28 RX ORDER — IOPAMIDOL 408 MG/ML
INJECTION, SOLUTION INTRATHECAL PRN
Status: DISCONTINUED | OUTPATIENT
Start: 2022-09-28 | End: 2022-09-28 | Stop reason: HOSPADM

## 2022-09-28 ASSESSMENT — PAIN SCALES - GENERAL: PAINLEVEL: NO PAIN (0)

## 2022-09-28 NOTE — NURSING NOTE
"Chief Complaint   Patient presents with     Consult     New pt consult -- paraspinal mass       Vitals:    09/28/22 0907   BP: 128/76   Pulse: 106   SpO2: 98%   Weight: 71.1 kg (156 lb 12.8 oz)   Height: 1.575 m (5' 2\")       Body mass index is 28.68 kg/m .          DORA WOODS EMT    "

## 2022-09-28 NOTE — DISCHARGE INSTRUCTIONS
Home Care Instructions after an Epidural Steroid Pain Injection    A lumbar epidural steroid injection delivers steroid medication directly into the area that may be causing your lower back pain and/or leg pain. A cervical or thoracic epidural steroid injection delivers steroids into the epidural space surrounding spinal nerve roots to help relieve pain in the upper spine/neck.    Activity  -Rest today  -Do not work today  -Resume normal activity tomorrow  -DO NOT shower for 24 hours  -DO NOT remove bandaid for 24 hours    Pain  -You may experience soreness at the injection site for one or two days  -You may use an ice pack for 20 minutes every 2 hours for the first 24 hours  -You may use a heating pad after the first 24 hours  -You may use Tylenol (acetaminophen) every 4 hours or other pain medicines as     directed by your physician    You may experience numbness radiating into your legs or arms (depending on the procedure location). This numbness may last several hours. Until sensation returns to normal; please use caution in walking, climbing stairs, and stepping out of your vehicle, etc.      Common side effects of steroids:  Not everyone will experience corticosteroid side effects. If side effects are experienced, they will gradually subside in the 7-10 day period following an injection. Most common side effects include:  -Flushed face and/or chest  -Feeling of warmth, particularly in the face but could be an overall feeling of warmth  -Increased blood sugar in diabetic patients  -Menstrual irregularities my occur. If taking hormone-based birth control an alternate method of birth control is recommended  -Sleep disturbances and/or mood swings are possible  -Leg cramps    Please contact us if you have:  -Severe pain  -Fever more than 101.5 degrees Fahrenheit  -Signs of infection at the injection site (redness, swelling, or drainage)    FOR PAIN CENTER PATIENTS:  If you have questions, please contact the Pain  Clinic at [phone] Option #1 between the hours of 7:00 am and 3:00 pm Monday through Friday. After office hours you can contact the on call provider by dialing [phone]. If you need immediate attention, we recommend that you go to a hospital emergency room or dial [phone].      FOR PM&R PATIENTS:  For patients seen by the PM and R service, please call [phone]. If you need to fax a pain diary to PM&R the fax number is [phone]. If you are unable to fax, uploading to [employer] is encouraged, then send to provider. If you have procedure scheduling questions please call [phone] Option #2

## 2022-09-28 NOTE — TELEPHONE ENCOUNTER
Pt called and stated that Dr. Nielson advised her to come in today for a procedure. Scheduled pt and advised a covid test would need to be done prior. Pt is aware and will come in early for the covid test (Arrival @ 11 am)

## 2022-09-28 NOTE — PROCEDURES
"PHYSICAL MEDICINE & REHABILITATION / MEDICAL SPINE      PROCEDURE DATE:  Sep 28, 2022      PATIENT NAME:  Mylene Vasquez  MRN:  [id number]  YOB: 1955      PRE-PROCEDURE DIAGNOSIS:  1. Lumbosacral radiculopathy        POST-PROCEDURE DIAGNOSIS:  1. Lumbosacral radiculopathy        PROCEDURE:  Fluoroscopic-guided left L5-S1 and S1-S2 transforaminal epidural steroid injections.  (CPT code/s:  87407, 72679)      PROCEDURE IN DETAIL:  Prior to the procedure, educational material was provided for the patient to review and take home.  After a discussion of the risks, benefits, and alternatives to the procedure, the patient expressed understanding and wished to proceed.  Consent form was signed.  The patient was brought to the fluoroscopy suite and placed in the prone position.  Procedural pause was conducted to verify:  patient identity, procedure to be performed, laterality, site, and patient position.    The skin was sterilely prepped using chlorhexidine and allowed to dry.  The skin was draped in the usual sterile fashion.  After identifying the left L5 and S1 pedicles with fluoroscopy, the skin and subcutaneous tissue were infiltrated with 2 ml 1% preservative-free lidocaine at each location.  Then, 22g 5\" Quincke needles were advanced under fluoroscopic guidance to the posterior aspect of the left L5-S1 and S1-S2 neuroforamens.  Appropriate foraminal depth was determined with a lateral fluoroscopic view, and anterior-posterior visualization confirmed needles were positioned at approximately the 6 o'clock positions relative to the pedicles.  After negative aspiration, Isovue-M 200 (iopamidol) was injected using live fluoroscopy/digital subtraction angiography.  At the S1-S2 level, initial contrast flowed preferentially along the S1 nerve root.  This needle was withdrawn slightly.  New contrast spread was epidural at the S1-S2 location.  A total of 2.2 ml Isovue-M 200 (iopamidol) was used during these " injections.  Contrast spread confirmed appropriate transforaminal spread without evidence of intravascular or intrathecal uptake.  Next, 1 ml 1% lidocaine was injected at each level.  After 90 seconds, a brief assessment of sensation and strength was performed.  There were no gross changes in sensation or strength.  Then, 7.5 mg dexamethasone followed by 1 ml 1% lidocaine was injected at each level.  Following the injections, the needles were withdrawn slightly and flushed with 0.5 ml preservative-free 1% lidocaine as they were fully extracted.    The patient tolerated the procedure well, and there were no apparent complications.  The patient was escorted back to the postprocedure room.  The patient was monitored for side effects.  No reactions were noted.  After appropriate observation, the patient was dismissed from the clinic in good condition.    Preprocedure pain level: 5/10.  Postprocedure pain level: 0/10.      Laurent Nielson MD

## 2022-09-28 NOTE — PROGRESS NOTES
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    HISTORY OF PRESENTING COMPLAINT:  Ms. Vasquez is 67 years old.  Her history goes back at least 10-15 years when she started having back pain and then noticed a slowly enlarging mass in the left paraspinous area that subsequently has been worked up and found to be an arteriovenous malformation in the soft tissues.  It is not directly involving the spinal cord.  She has been referred to me for my recommendations regarding removal at the time of embolization.  The lesion itself does not cause her pain.  She has diffuse back pain from osteoarthritis that has been treated conservatively as well with steroid injections with relief.    PAST MEDICAL HISTORY:  Nil.    PAST SURGICAL HISTORY:  Some kind of bladder surgery.    MEDICATIONS:  Paxil, gabapentin, Anafranil.      ALLERGIES:  Penicillin, itchiness.    SOCIAL HISTORY:  Does not smoke.  Does not drink.  Lives in [place].  Retired.    REVIEW OF SYSTEMS:  Denies chest pain, shortness of breath, MI, CVA, DVT and PE.    PHYSICAL EXAMINATION:  Vital signs stable.  She is afebrile, in no obvious distress.  She is 5 feet 2 inches, 156 pounds, BMI 29 kg/m2.  On examination of her back, she has diffuse fullness in the left paraspinous area.  There is a firmness in that area, nonmobile mass.  MRI showed this to be an AVM. Free from the spinal cord.    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  I had a audrey, detailed discussion with the patient and her  in the presence of my nurse, Hannah Dhillon, who was present from beginning to end.  Discussed with them my incomplete understanding of the etiology in terms of what the differential is and whether an AVM is the definitive answer or are they worried about this being a tumor.  Secondly, the  extent of disease, specifically its relationship to the spinal cord and the potential risk of injuring the spinal cord given the fact that the patient brought up a discussion with her interventional radiologist/neurologist that brought that up.  Thirdly, the reason for removal given the fact she has no symptoms and the fact it has been there for multiple years and has remained stable.  Once I have a better understanding behind the answers to these questions, I can then better assess what needs to be done and the extent of the excision and the potential risks, benefits and alternatives. Obviously, without knowing the answers to these questions, just cutting this whole mass out is going to lead to a lot of muscle damage in the paraspinous area that can affect the overall stability of her back given her back pain issues.  This may be important.  Once I have a better understanding, we can advise the patient more.  I will follow up with the patient as soon as possible.  All their questions were answered.  They were happy with the visit.    Total time spent with chart review, visit itself and post-visit paperwork was 45 minutes.    GREG Goldstein MD    cc:  Afia Alcantar MD  [facility]  [address]

## 2022-09-28 NOTE — LETTER
9/28/2022       RE: Mylene Vasquez  [address] [place] [address]     Dear Colleague,    Thank you for referring your patient, Mylene Vasquez, to the [facility] [place] at [facility]. Please see a copy of my visit note below.    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    HISTORY OF PRESENTING COMPLAINT:  Ms. Vasquez is 67 years old.  Her history goes back at least 10-15 years when she started having back pain and then noticed a slowly enlarging mass in the left paraspinous area that subsequently has been worked up and found to be an arteriovenous malformation in the soft tissues.  It is not directly involving the spinal cord.  She has been referred to me for my recommendations regarding removal at the time of embolization.  The lesion itself does not cause her pain.  She has diffuse back pain from osteoarthritis that has been treated conservatively as well with steroid injections with relief.    PAST MEDICAL HISTORY:  Nil.    PAST SURGICAL HISTORY:  Some kind of bladder surgery.    MEDICATIONS:  Paxil, gabapentin, Anafranil.      ALLERGIES:  Penicillin, itchiness.    SOCIAL HISTORY:  Does not smoke.  Does not drink.  Lives in [place].  Retired.    REVIEW OF SYSTEMS:  Denies chest pain, shortness of breath, MI, CVA, DVT and PE.    PHYSICAL EXAMINATION:  Vital signs stable.  She is afebrile, in no obvious distress.  She is 5 feet 2 inches, 156 pounds, BMI 29 kg/m2.  On examination of her back, she has diffuse fullness in the left paraspinous area.  There is a firmness in that area, nonmobile mass.  MRI showed this to be an AVM. Free from the spinal cord.    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  I had a audrey, detailed  discussion with the patient and her  in the presence of my nurse, Hannah Dhillon, who was present from beginning to end.  Discussed with them my incomplete understanding of the etiology in terms of what the differential is and whether an AVM is the definitive answer or are they worried about this being a tumor.  Secondly, the extent of disease, specifically its relationship to the spinal cord and the potential risk of injuring the spinal cord given the fact that the patient brought up a discussion with her interventional radiologist/neurologist that brought that up.  Thirdly, the reason for removal given the fact she has no symptoms and the fact it has been there for multiple years and has remained stable.  Once I have a better understanding behind the answers to these questions, I can then better assess what needs to be done and the extent of the excision and the potential risks, benefits and alternatives. Obviously, without knowing the answers to these questions, just cutting this whole mass out is going to lead to a lot of muscle damage in the paraspinous area that can affect the overall stability of her back given her back pain issues.  This may be important.  Once I have a better understanding, we can advise the patient more.  I will follow up with the patient as soon as possible.  All their questions were answered.  They were happy with the visit.    Total time spent with chart review, visit itself and post-visit paperwork was 45 minutes.      GREG Goldstein MD      cc:  Afia Alcantar MD  [address]  [place]  [address]

## 2022-10-04 ENCOUNTER — TELEPHONE (OUTPATIENT)
Dept: NEUROLOGY | Facility: CLINIC | Age: 67
End: 2022-10-04

## 2022-10-15 ENCOUNTER — HEALTH MAINTENANCE LETTER (OUTPATIENT)
Age: 67
End: 2022-10-15

## 2022-10-25 ENCOUNTER — TELEPHONE (OUTPATIENT)
Dept: NEUROSURGERY | Facility: CLINIC | Age: 67
End: 2022-10-25

## 2022-10-25 NOTE — TELEPHONE ENCOUNTER
[facility] Call Center    Phone Message:      Pt called, stating she would like an INJECTION at her next appt with MD Nisreen.      Pt would like a CALL BACK to discuss this.  Please CALL pt.  Thank you.    May a detailed message be left on voicemail: Yes     Reason for Call: Other: Cortisone Injection     Action Taken: Message routed to:  Other: Team    Travel Screening: Not Applicable

## 2022-10-26 NOTE — TELEPHONE ENCOUNTER
I called and spoke with Ms. Mylene Vasquez.  She has had some increase in back pain as she has been much more active.  She is no longer noting pain radiating into her left lower extremity.  Ms. Mylene Vasquez has a follow-up appointment in this clinic scheduled for 10/28/2022.  We will revisit her pain at that appointment.  We will also address gabapentin at that time.    Laurent Nielson MD

## 2022-10-28 ENCOUNTER — OFFICE VISIT (OUTPATIENT)
Dept: ORTHOPEDICS | Facility: CLINIC | Age: 67
End: 2022-10-28
Payer: COMMERCIAL

## 2022-10-28 VITALS
HEIGHT: 62 IN | DIASTOLIC BLOOD PRESSURE: 87 MMHG | SYSTOLIC BLOOD PRESSURE: 135 MMHG | BODY MASS INDEX: 28.52 KG/M2 | HEART RATE: 84 BPM | WEIGHT: 155 LBS | OXYGEN SATURATION: 99 %

## 2022-10-28 DIAGNOSIS — M54.41 CHRONIC RIGHT-SIDED LOW BACK PAIN WITH RIGHT-SIDED SCIATICA: ICD-10-CM

## 2022-10-28 DIAGNOSIS — M47.816 FACET ARTHROPATHY, LUMBAR: ICD-10-CM

## 2022-10-28 DIAGNOSIS — M41.9 SCOLIOSIS OF LUMBAR SPINE, UNSPECIFIED SCOLIOSIS TYPE: ICD-10-CM

## 2022-10-28 DIAGNOSIS — M51.369 LUMBAR DEGENERATIVE DISC DISEASE: ICD-10-CM

## 2022-10-28 DIAGNOSIS — M47.817 FACET ARTHROPATHY, LUMBOSACRAL: Primary | ICD-10-CM

## 2022-10-28 DIAGNOSIS — M43.16 SPONDYLOLISTHESIS, LUMBAR REGION: ICD-10-CM

## 2022-10-28 DIAGNOSIS — M54.17 LUMBOSACRAL RADICULOPATHY: ICD-10-CM

## 2022-10-28 DIAGNOSIS — G89.29 CHRONIC RIGHT-SIDED LOW BACK PAIN WITH RIGHT-SIDED SCIATICA: ICD-10-CM

## 2022-10-28 PROCEDURE — 99215 OFFICE O/P EST HI 40 MIN: CPT | Performed by: PHYSICAL MEDICINE & REHABILITATION

## 2022-10-28 RX ORDER — GABAPENTIN 300 MG/1
300 CAPSULE ORAL AT BEDTIME
Qty: 60 CAPSULE | Refills: 0 | Status: SHIPPED | OUTPATIENT
Start: 2022-10-28 | End: 2022-11-01

## 2022-10-28 RX ORDER — GABAPENTIN 300 MG/1
CAPSULE ORAL
Qty: 90 CAPSULE | Refills: 4 | OUTPATIENT
Start: 2022-10-28

## 2022-10-28 ASSESSMENT — PAIN SCALES - GENERAL: PAINLEVEL: MODERATE PAIN (5)

## 2022-10-28 NOTE — PATIENT INSTRUCTIONS
Please schedule fluoroscopic-guided lumbosacral facet joint medial branch blocks with me.  The [facility] Procedure Scheduling Team will call you to schedule your procedure in the next 0-3 days.  You can also call them directly at [phone].

## 2022-10-28 NOTE — PROGRESS NOTES
PHYSICAL MEDICINE & REHABILITATION / MEDICAL SPINE        Date:  Oct 28, 2022    Name:  Mylene Vasquez  YOB: 1955  MRN:  [id number]          CHIEF COMPLAINT:  Low back pain.        HISTORY OF PRESENT ILLNESS:  Ms. Mylene Vasquez is a 67-year-old, right-hand-dominant, female.  She is retired.      Ms. Mylene Vasquez stated she has osteoarthritis in her feet.  She denied any other injuries of her low back, pelvis, hips, thighs, knees, legs, ankles, feet, toes.    Ms. Mylene Vasquez denied any personal or family history of autoimmune diseases, rheumatologic diseases, gout, pseudogout.  She denied any personal or family history of neurologic diseases.  Ms. Vasquez denied any personal or family history of inflammatory bowel diseases.     Ms. Mylene Vasquez was last seen in this clinic in 05/09/2022.  On 09/28/2022, Ms. Vasquez had fluoroscopic-guided left L5-S1 and left S1-S2 transforaminal epidural corticosteroid injections.  She returns to clinic today, 10/28/2022.  Ms. Vasquez is accompanied to today's appointment by her , Mr. Evan Vasquez.    Ms. Mylene Vasquez notes that the pain in her left lower extremity is significantly improved since the left transforaminal epidural steroid injections.  She is able to sleep now at night without pain in her left lower extremity.  Ms. Vasquez complains of bilateral low back pain.  Low back pain is currently rated as 5/10.  Low back pain varies from 3/10 to 9/10.    Ms. Mylene Vasquez's low back pain is intermittent.  Low back pain develops and worsens with standing or walking.  Low back pain is improved with sitting.  Ms. Mylene Vasquez has found benefit from gabapentin.  She is taking gabapentin 300 mg p.o. nightly as needed.  She does not take this every night.        ALLERGIES:  Allergies   Allergen Reactions     Bactrim [Sulfamethoxazole W/Trimethoprim] Nausea and Vomiting and Diarrhea     Pcn [Penicillins]      itchy         MEDICATIONS:  Current Outpatient  Medications   Medication Sig Dispense Refill     clomiPRAMINE (ANAFRANIL) 50 MG capsule every evening Take three tablets at bedtime       gabapentin (NEURONTIN) 300 MG capsule Take one tab at night as needed for pain 90 capsule 4     PARoxetine (PAXIL) 30 MG tablet Take by mouth every evening       VITAMIN D PO Take by mouth every 7 days           PAST MEDICAL HISTORY:  Past Medical History:   Diagnosis Date     Anxiety      Arthritis 7/2012     Chronic midline low back pain with right-sided sciatica      Depressive disorder      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         PAST SURGICAL HISTORY:  Past Surgical History:   Procedure Laterality Date     COLONOSCOPY N/A 11/26/2018    rpt 10 years ;  Surgeon: Ilsa Aguirre MD;  Location:  GI     INJECT EPIDURAL TRANSFORAMINAL LUMBAR / SACRAL SINGLE Left 9/28/2022    Procedure: Left L5-S1 and S1-S2 transforaminal epidural corticosteroid injections.;  Surgeon: Laurent Nielson MD;  Location: [facility] OR     IR SPINAL ANGIOGRAM  09/09/2022     URETHROPLASTY  1968         FAMILY HISTORY:  Family History   Problem Relation Age of Onset     Osteoporosis Mother      Hypertension Mother      Depression Mother      Anxiety Disorder Mother      Alcohol/Drug Father      Lung Cancer Father         Lung cancer from smoking     Anxiety Disorder Sister      Anxiety Disorder Sister      Colon Cancer No family hx of      Anesthesia Reaction No family hx of      Deep Vein Thrombosis (DVT) No family hx of      Cardiovascular No family hx of          SOCIAL HISTORY:  Social History     Socioeconomic History     Marital status:      Spouse name: Jayden     Number of children: 2     Years of education: Not on file     Highest education level: Not on file   Occupational History     Occupation: retired   Tobacco Use     Smoking status: Never     Smokeless tobacco: Never   Vaping Use     Vaping Use: Never used   Substance and Sexual Activity     Alcohol use: Not Currently     Comment: very little  "    Drug use: Never     Sexual activity: Yes     Partners: Male   Other Topics Concern     Parent/sibling w/ CABG, MI or angioplasty before 65F 55M? No   Social History Narrative     Not on file     Social Determinants of Health     Financial Resource Strain: Low Risk      Difficulty of Paying Living Expenses: Not very hard   Food Insecurity: No Food Insecurity     Worried About Running Out of Food in the Last Year: Never true     Ran Out of Food in the Last Year: Never true   Transportation Needs: No Transportation Needs     Lack of Transportation (Medical): No     Lack of Transportation (Non-Medical): No   Physical Activity: Insufficiently Active     Days of Exercise per Week: 5 days     Minutes of Exercise per Session: 20 min   Stress: No Stress Concern Present     Feeling of Stress : Only a little   Social Connections: Socially Integrated     Frequency of Communication with Friends and Family: More than three times a week     Frequency of Social Gatherings with Friends and Family: Three times a week     Attends [employer] Services: More than 4 times per year     Active Member of Clubs or Organizations: Yes     Attends Club or Organization Meetings: Not on file     Marital Status:    Intimate Partner Violence: Not on file   Housing Stability: Low Risk      Unable to Pay for Housing in the Last Year: No     Number of Places Lived in the Last Year: 1     Unstable Housing in the Last Year: No         PHYSICAL EXAMINATION:  Vitals:    10/28/22 0800   BP: 135/87   Pulse: 84   SpO2: 99%   Weight: 70.3 kg (155 lb)   Height: 1.575 m (5' 2\")       GENERAL:  No acute distress.  Pleasant and cooperative.   PSYCH:  Normal mood and affect.  HEAD:  Normocephalic.  SPEECH:  No dysarthria.  EYES:  No scleral icterus.  Wearing glasses.  EARS:  Hearing is intact to spoken voice.  NOSE/MOUTH:  Wearing a face mask.  LUNGS:  No respiratory distress.  No increased work of breathing.    BALANCE AND GAIT: The patient has a " reciprocal gait pattern without antalgia.  She is able heel walk, toe walk, tandem walk without difficulty.  Double leg squat is performed with quadriceps dominant pattern slight dynamic knee valgus bilaterally.    INSPECTION:  There is no erythema, ecchymosis, deformity, asymmetry, or abnormality of the low back.    LUMBOPELVIC PALPATION:  Lumbar Spinous Processes: Mild tenderness L5.  Lumbar Paraspinals:  Right mild to moderate tenderness L5.  Left mild to moderate tenderness L4-L5.  Posterior Superior Iliac Spine:  Right mild tenderness.  Left mild tenderness.  Iliac Crest:  Right not tender.  Left not tender.  Sacroiliac Joint:  Right not tender.  Left not tender.  Hip External Rotators:  Right not tender.  Left not tender.  Greater Trochanter:  Right not tender.  Left not tender.    THORACOLUMBAR RANGE OF MOTION:  Forward flexion (85 ): Mildly reduced range of motion, does not cause pain, does not cause radiating pain..  Extension (30 ): Mildly reduced range of motion, increases low back pain, does not cause radiating pain.  Lateral bending right (30 ):  Mildly reduced range of motion, increases low back pain, does not cause radiating pain.  Lateral bending left (30 ):  Mildly reduced range of motion, increases low back pain, does not cause radiating pain.  Twisting right with extension:  Mildly reduced range of motion, increases low back pain, does not cause radiating pain.  Twisting left with extension:  Mildly reduced range of motion, increases low back pain, does not cause radiating pain.  Sacroiliac joint dysfunction:  Right -.  Left -.        IMAGING:  MRI LUMBAR SPINE WITHOUT AND WITH CONTRAST   5/6/2022 8:13 AM      HISTORY: Paraspinal mass.      TECHNIQUE: Multiplanar multisequence MRI of the lumbar spine without and with 7 mL Gadavist.      COMPARISON: Lumbar spine MRI dated 4/18/2022. Correlation also made with thoracic spine MRI of 5/6/2022.      FINDINGS: There is transitional anatomy at the  lumbosacral junction. In keeping with nomenclature used on previous lumbar spine MRI report of 4/18/2022, I will presume that there are five lumbar vertebral bodies with a transitional partially lumbarized S1 vertebra. There is a rudimentary S1-S2 intervertebral disc.     Unchanged chronic Schmorl's nodes along the L1 inferior endplate and involving the L3 superior endplate. No new vertebral body height loss identified. There is unchanged minimal degenerative anterolisthesis of L4 on L5. Sagittal alignment otherwise appears normal. Minimal Modic type I degenerative endplate signal changes at L1-L2 and L5-S1. Nonspecific edema-like marrow signal change and enhancement centered about the right greater than left L4-L5 facet joints, as before. Although nonspecific, this may represent reactive marrow signal changes in the setting of degenerative facet arthropathy, possibly with an active/inflammatory component. Clinical correlation suggested. Bone marrow signal otherwise appears unremarkable. There is a normal appearance of the distal spinal cord with the conus terminating at L2. 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 Diffuse intervertebral disc desiccation.     As before, there is partial visualization of an irregular somewhat heterogeneous enhancing mass centered in the left posterior paraspinous/erector spinae musculature extending from the lower thoracic region down to the level of L2. As before, multiple prominent flow voids are seen within this mass. Please see separate report from thoracic spine MRI of same day for additional details. Otherwise, the visualized paraspinous soft tissues appear unremarkable. Mild degenerative changes of the left sacroiliac joint anteriorly are noted.     There has been no significant change in multilevel degenerative disc disease, posterior/posterolateral endplate spurring, and degenerative facet disease compared to the prior exam, with varying degrees of spinal canal/lateral  recess and neural foraminal stenosis, as detailed on the prior MRI report in a level by level fashion. No high-grade spinal canal stenosis is identified. Mild multilevel lower lumbar lateral recess narrowing, as before. Unchanged severe right L5-S1 neural foraminal stenosis with lesser degrees of neural foraminal narrowing elsewhere.    IMPRESSION:  1. Redemonstration of an irregular enhancing mass centered in the left posterior paraspinous/erector spinae musculature with multiple prominent internal flow voids extending from the level of T10 down to the level of L2. Please see separate report from the thoracic spine MRI of same day for additional details. Primary differential considerations include a vascular malformation with possible arteriovenous shunting component, or a highly vascularized neoplasm. Consider cross-sectional or conventional diagnostic spinal angiography. Neurointerventional radiology/neuro-endovascular specialty consultation is suggested.  2. Unchanged multilevel degenerative findings in the lumbar spine, as described. Unchanged severe right L5-S1 neural foraminal stenosis. No high-grade central spinal canal stenosis.         ASSESSMENT/PLAN:  Ms. Mylene Vasquez is a 67-year-old, right-hand-dominant, female.  Symptoms of left lumbosacral radiculopathy (L5, S1) have been significantly improved by recent lumbar left lumbosacral transforaminal epidural corticosteroid injections.    Ms. Mylene Vasquez has symptomatic lumbosacral facet arthropathy.  Discussed diagnosis, pathophysiology, and treatment options with Ms. and Mr. Vasquez.  Discussed the options of doing nothing/living with it, physical therapy, chiropractic care, oral medications such as gabapentin and pregabalin, lumbosacral facet joint medial branch blocks with following radiofrequency ablations.  Ms. Mylene Vasquez will be set up for medial branch blocks of the bilateral L4-L5 and L5-S1 facet joints.        Total Time on encounter:  43  minutes were spent on one more or more of the following:  discussion with patient, history, exam, coordinating care, treatment goals, record review, documenting clinical information, and/or data review.      Lauernt Nielson MD

## 2022-10-28 NOTE — LETTER
10/28/2022         RE: Mylene Vasquez  [address] [place] [address]        Dear Colleague,    Thank you for referring your patient, Mylene Vasquez, to the [facility]. Please see a copy of my visit note below.    PHYSICAL MEDICINE & REHABILITATION / MEDICAL SPINE        Date:  Oct 28, 2022    Name:  Mylene Vasquez  YOB: 1955  MRN:  [id number]          CHIEF COMPLAINT:  Low back pain.        HISTORY OF PRESENT ILLNESS:  Ms. Mylene Vasquez is a 67-year-old, right-hand-dominant, female.  She is retired.      Ms. Mylene Vasquez stated she has osteoarthritis in her feet.  She denied any other injuries of her low back, pelvis, hips, thighs, knees, legs, ankles, feet, toes.    Ms. Mylene Vasquez denied any personal or family history of autoimmune diseases, rheumatologic diseases, gout, pseudogout.  She denied any personal or family history of neurologic diseases.  Ms. Vasquez denied any personal or family history of inflammatory bowel diseases.     Ms. Mylene Vasquez was last seen in this clinic in 05/09/2022.  On 09/28/2022, Ms. Vasquez had fluoroscopic-guided left L5-S1 and left S1-S2 transforaminal epidural corticosteroid injections.  She returns to clinic today, 10/28/2022.  Ms. Vasquez is accompanied to today's appointment by her , Mr. Evan Vasquez.    Ms. Mylene Vasquez notes that the pain in her left lower extremity is significantly improved since the left transforaminal epidural steroid injections.  She is able to sleep now at night without pain in her left lower extremity.  Ms. Vasquez complains of bilateral low back pain.  Low back pain is currently rated as 5/10.  Low back pain varies from 3/10 to 9/10.    Ms. Mylene Vasquez's low back pain is intermittent.  Low back pain develops and worsens with standing or walking.  Low back pain is improved with sitting.  Ms. Mylene Vasquez has found benefit from gabapentin.  She is taking gabapentin 300 mg p.o. nightly as  needed.  She does not take this every night.        ALLERGIES:  Allergies   Allergen Reactions     Bactrim [Sulfamethoxazole W/Trimethoprim] Nausea and Vomiting and Diarrhea     Pcn [Penicillins]      itchy         MEDICATIONS:  Current Outpatient Medications   Medication Sig Dispense Refill     clomiPRAMINE (ANAFRANIL) 50 MG capsule every evening Take three tablets at bedtime       gabapentin (NEURONTIN) 300 MG capsule Take one tab at night as needed for pain 90 capsule 4     PARoxetine (PAXIL) 30 MG tablet Take by mouth every evening       VITAMIN D PO Take by mouth every 7 days           PAST MEDICAL HISTORY:  Past Medical History:   Diagnosis Date     Anxiety      Arthritis 7/2012     Chronic midline low back pain with right-sided sciatica      Depressive disorder      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         PAST SURGICAL HISTORY:  Past Surgical History:   Procedure Laterality Date     COLONOSCOPY N/A 11/26/2018    rpt 10 years ;  Surgeon: Ilsa Aguirre MD;  Location:  GI     INJECT EPIDURAL TRANSFORAMINAL LUMBAR / SACRAL SINGLE Left 9/28/2022    Procedure: Left L5-S1 and S1-S2 transforaminal epidural corticosteroid injections.;  Surgeon: Laurent Nielson MD;  Location: [facility] OR     IR SPINAL ANGIOGRAM  09/09/2022     URETHROPLASTY  1968         FAMILY HISTORY:  Family History   Problem Relation Age of Onset     Osteoporosis Mother      Hypertension Mother      Depression Mother      Anxiety Disorder Mother      Alcohol/Drug Father      Lung Cancer Father         Lung cancer from smoking     Anxiety Disorder Sister      Anxiety Disorder Sister      Colon Cancer No family hx of      Anesthesia Reaction No family hx of      Deep Vein Thrombosis (DVT) No family hx of      Cardiovascular No family hx of          SOCIAL HISTORY:  Social History     Socioeconomic History     Marital status:      Spouse name: Jayden     Number of children: 2     Years of education: Not on file     Highest education level: Not on  "file   Occupational History     Occupation: retired   Tobacco Use     Smoking status: Never     Smokeless tobacco: Never   Vaping Use     Vaping Use: Never used   Substance and Sexual Activity     Alcohol use: Not Currently     Comment: very little     Drug use: Never     Sexual activity: Yes     Partners: Male   Other Topics Concern     Parent/sibling w/ CABG, MI or angioplasty before 65F 55M? No   Social History Narrative     Not on file     Social Determinants of Health     Financial Resource Strain: Low Risk      Difficulty of Paying Living Expenses: Not very hard   Food Insecurity: No Food Insecurity     Worried About Running Out of Food in the Last Year: Never true     Ran Out of Food in the Last Year: Never true   Transportation Needs: No Transportation Needs     Lack of Transportation (Medical): No     Lack of Transportation (Non-Medical): No   Physical Activity: Insufficiently Active     Days of Exercise per Week: 5 days     Minutes of Exercise per Session: 20 min   Stress: No Stress Concern Present     Feeling of Stress : Only a little   Social Connections: Socially Integrated     Frequency of Communication with Friends and Family: More than three times a week     Frequency of Social Gatherings with Friends and Family: Three times a week     Attends [employer] Services: More than 4 times per year     Active Member of Clubs or Organizations: Yes     Attends Club or Organization Meetings: Not on file     Marital Status:    Intimate Partner Violence: Not on file   Housing Stability: Low Risk      Unable to Pay for Housing in the Last Year: No     Number of Places Lived in the Last Year: 1     Unstable Housing in the Last Year: No         PHYSICAL EXAMINATION:  Vitals:    10/28/22 0800   BP: 135/87   Pulse: 84   SpO2: 99%   Weight: 70.3 kg (155 lb)   Height: 1.575 m (5' 2\")       GENERAL:  No acute distress.  Pleasant and cooperative.   PSYCH:  Normal mood and affect.  HEAD:  Normocephalic.  SPEECH:  No " dysarthria.  EYES:  No scleral icterus.  Wearing glasses.  EARS:  Hearing is intact to spoken voice.  NOSE/MOUTH:  Wearing a face mask.  LUNGS:  No respiratory distress.  No increased work of breathing.    BALANCE AND GAIT: The patient has a reciprocal gait pattern without antalgia.  She is able heel walk, toe walk, tandem walk without difficulty.  Double leg squat is performed with quadriceps dominant pattern slight dynamic knee valgus bilaterally.    INSPECTION:  There is no erythema, ecchymosis, deformity, asymmetry, or abnormality of the low back.    LUMBOPELVIC PALPATION:  Lumbar Spinous Processes: Mild tenderness L5.  Lumbar Paraspinals:  Right mild to moderate tenderness L5.  Left mild to moderate tenderness L4-L5.  Posterior Superior Iliac Spine:  Right mild tenderness.  Left mild tenderness.  Iliac Crest:  Right not tender.  Left not tender.  Sacroiliac Joint:  Right not tender.  Left not tender.  Hip External Rotators:  Right not tender.  Left not tender.  Greater Trochanter:  Right not tender.  Left not tender.    THORACOLUMBAR RANGE OF MOTION:  Forward flexion (85 ): Mildly reduced range of motion, does not cause pain, does not cause radiating pain..  Extension (30 ): Mildly reduced range of motion, increases low back pain, does not cause radiating pain.  Lateral bending right (30 ):  Mildly reduced range of motion, increases low back pain, does not cause radiating pain.  Lateral bending left (30 ):  Mildly reduced range of motion, increases low back pain, does not cause radiating pain.  Twisting right with extension:  Mildly reduced range of motion, increases low back pain, does not cause radiating pain.  Twisting left with extension:  Mildly reduced range of motion, increases low back pain, does not cause radiating pain.  Sacroiliac joint dysfunction:  Right -.  Left -.        IMAGING:  MRI LUMBAR SPINE WITHOUT AND WITH CONTRAST   5/6/2022 8:13 AM      HISTORY: Paraspinal mass.      TECHNIQUE:  Multiplanar multisequence MRI of the lumbar spine without and with 7 mL Gadavist.      COMPARISON: Lumbar spine MRI dated 4/18/2022. Correlation also made with thoracic spine MRI of 5/6/2022.      FINDINGS: There is transitional anatomy at the lumbosacral junction. In keeping with nomenclature used on previous lumbar spine MRI report of 4/18/2022, I will presume that there are five lumbar vertebral bodies with a transitional partially lumbarized S1 vertebra. There is a rudimentary S1-S2 intervertebral disc.     Unchanged chronic Schmorl's nodes along the L1 inferior endplate and involving the L3 superior endplate. No new vertebral body height loss identified. There is unchanged minimal degenerative anterolisthesis of L4 on L5. Sagittal alignment otherwise appears normal. Minimal Modic type I degenerative endplate signal changes at L1-L2 and L5-S1. Nonspecific edema-like marrow signal change and enhancement centered about the right greater than left L4-L5 facet joints, as before. Although nonspecific, this may represent reactive marrow signal changes in the setting of degenerative facet arthropathy, possibly with an active/inflammatory component. Clinical correlation suggested. Bone marrow signal otherwise appears unremarkable. There is a normal appearance of the distal spinal cord with the conus terminating at L2. 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 Diffuse intervertebral disc desiccation.     As before, there is partial visualization of an irregular somewhat heterogeneous enhancing mass centered in the left posterior paraspinous/erector spinae musculature extending from the lower thoracic region down to the level of L2. As before, multiple prominent flow voids are seen within this mass. Please see separate report from thoracic spine MRI of same day for additional details. Otherwise, the visualized paraspinous soft tissues appear unremarkable. Mild degenerative changes of the left sacroiliac joint  anteriorly are noted.     There has been no significant change in multilevel degenerative disc disease, posterior/posterolateral endplate spurring, and degenerative facet disease compared to the prior exam, with varying degrees of spinal canal/lateral recess and neural foraminal stenosis, as detailed on the prior MRI report in a level by level fashion. No high-grade spinal canal stenosis is identified. Mild multilevel lower lumbar lateral recess narrowing, as before. Unchanged severe right L5-S1 neural foraminal stenosis with lesser degrees of neural foraminal narrowing elsewhere.    IMPRESSION:  1. Redemonstration of an irregular enhancing mass centered in the left posterior paraspinous/erector spinae musculature with multiple prominent internal flow voids extending from the level of T10 down to the level of L2. Please see separate report from the thoracic spine MRI of same day for additional details. Primary differential considerations include a vascular malformation with possible arteriovenous shunting component, or a highly vascularized neoplasm. Consider cross-sectional or conventional diagnostic spinal angiography. Neurointerventional radiology/neuro-endovascular specialty consultation is suggested.  2. Unchanged multilevel degenerative findings in the lumbar spine, as described. Unchanged severe right L5-S1 neural foraminal stenosis. No high-grade central spinal canal stenosis.         ASSESSMENT/PLAN:  Ms. Mylene Vasquez is a 67-year-old, right-hand-dominant, female.  Symptoms of left lumbosacral radiculopathy (L5, S1) have been significantly improved by recent lumbar left lumbosacral transforaminal epidural corticosteroid injections.    Ms. Mylene Vasquez has symptomatic lumbosacral facet arthropathy.  Discussed diagnosis, pathophysiology, and treatment options with Ms. and Mr. Vasquez.  Discussed the options of doing nothing/living with it, physical therapy, chiropractic care, oral medications such as  gabapentin and pregabalin, lumbosacral facet joint medial branch blocks with following radiofrequency ablations.  Ms. Mylene Vasquez will be set up for medial branch blocks of the bilateral L4-L5 and L5-S1 facet joints.        Total Time on encounter:  43 minutes were spent on one more or more of the following:  discussion with patient, history, exam, coordinating care, treatment goals, record review, documenting clinical information, and/or data review.      Laurent Nielson MD          Again, thank you for allowing me to participate in the care of your patient.        Sincerely,        Laurent Nielson MD

## 2022-10-28 NOTE — LETTER
Date:October 31, 2022      Patient was self referred, no letter generated. Do not send.        [facility] Information

## 2022-10-28 NOTE — TELEPHONE ENCOUNTER
Reason for Call:  Medication or medication refill:    Do you use a [facility]?  Name of the pharmacy and phone number for the current request:     [facility] in [facility]    Name of the medication requested:     gabapentin (NEURONTIN) 300 MG capsule     Other request: NA    Can we leave a detailed message on this number? YES    Phone number patient can be reached at: Cell number on file:    Telephone Information:   Mobile [phone]       Best Time: anytime      Call taken on 10/28/2022 at 2:18 PM by Arely Espinal

## 2022-11-01 ENCOUNTER — MYC REFILL (OUTPATIENT)
Dept: ORTHOPEDICS | Facility: CLINIC | Age: 67
End: 2022-11-01

## 2022-11-01 DIAGNOSIS — M54.17 LUMBOSACRAL RADICULOPATHY: ICD-10-CM

## 2022-11-01 DIAGNOSIS — M47.817 FACET ARTHROPATHY, LUMBOSACRAL: ICD-10-CM

## 2022-11-01 RX ORDER — GABAPENTIN 300 MG/1
300 CAPSULE ORAL AT BEDTIME
Qty: 60 CAPSULE | Refills: 0 | Status: SHIPPED | OUTPATIENT
Start: 2022-11-01 | End: 2023-08-29

## 2022-11-07 ENCOUNTER — MYC MEDICAL ADVICE (OUTPATIENT)
Dept: ORTHOPEDICS | Facility: CLINIC | Age: 67
End: 2022-11-07

## 2022-11-07 ENCOUNTER — HOSPITAL ENCOUNTER (OUTPATIENT)
Dept: GENERAL RADIOLOGY | Facility: CLINIC | Age: 67
Discharge: HOME | End: 2022-11-07
Attending: PHYSICAL MEDICINE & REHABILITATION | Admitting: PHYSICAL MEDICINE & REHABILITATION
Payer: COMMERCIAL

## 2022-11-07 VITALS
SYSTOLIC BLOOD PRESSURE: 156 MMHG | DIASTOLIC BLOOD PRESSURE: 105 MMHG | RESPIRATION RATE: 18 BRPM | OXYGEN SATURATION: 100 %

## 2022-11-07 DIAGNOSIS — M47.817 FACET ARTHROPATHY, LUMBOSACRAL: Primary | ICD-10-CM

## 2022-11-07 PROCEDURE — 64493 INJ PARAVERT F JNT L/S 1 LEV: CPT | Mod: 50 | Performed by: PHYSICAL MEDICINE & REHABILITATION

## 2022-11-07 PROCEDURE — 64493 INJ PARAVERT F JNT L/S 1 LEV: CPT | Mod: 50

## 2022-11-07 PROCEDURE — 64494 INJ PARAVERT F JNT L/S 2 LEV: CPT | Mod: 50 | Performed by: PHYSICAL MEDICINE & REHABILITATION

## 2022-11-07 PROCEDURE — 255N000002 HC RX 255 OP 636: Performed by: PHYSICAL MEDICINE & REHABILITATION

## 2022-11-07 PROCEDURE — 250N000009 HC RX 250: Performed by: PHYSICAL MEDICINE & REHABILITATION

## 2022-11-07 RX ORDER — IOPAMIDOL 408 MG/ML
10 INJECTION, SOLUTION INTRATHECAL ONCE
Status: COMPLETED | OUTPATIENT
Start: 2022-11-07 | End: 2022-11-07

## 2022-11-07 RX ORDER — LIDOCAINE HYDROCHLORIDE 10 MG/ML
5 INJECTION, SOLUTION EPIDURAL; INFILTRATION; INTRACAUDAL; PERINEURAL ONCE
Status: COMPLETED | OUTPATIENT
Start: 2022-11-07 | End: 2022-11-07

## 2022-11-07 RX ORDER — BUPIVACAINE HYDROCHLORIDE 5 MG/ML
3 INJECTION, SOLUTION EPIDURAL; INTRACAUDAL ONCE
Status: COMPLETED | OUTPATIENT
Start: 2022-11-07 | End: 2022-11-07

## 2022-11-07 RX ADMIN — LIDOCAINE HYDROCHLORIDE 2.4 ML: 10 INJECTION, SOLUTION EPIDURAL; INFILTRATION; INTRACAUDAL; PERINEURAL at 14:05

## 2022-11-07 RX ADMIN — BUPIVACAINE HYDROCHLORIDE 15 MG: 5 INJECTION, SOLUTION EPIDURAL; INTRACAUDAL; PERINEURAL at 14:14

## 2022-11-07 RX ADMIN — IOPAMIDOL 1.8 ML: 408 INJECTION, SOLUTION INTRATHECAL at 14:13

## 2022-11-07 NOTE — PROGRESS NOTES
Assisted MD Nielson in fluoroscopic-guided bilateral lumbosacral facet joint medial branch blocks. Medications given per MAR. Pain prior to procedure 5/10 bilaterally. Pain post procedure 0/10. Pt tolerated procedure well. Injection site covered with dressing. Dressing clean, dry and intact at time of discharge. Discharge instructions given and all questions answered. Pt left ambulatory in stable condition.

## 2022-11-08 ENCOUNTER — HOSPITAL ENCOUNTER (OUTPATIENT)
Dept: MAMMOGRAPHY | Facility: CLINIC | Age: 67
Discharge: HOME | End: 2022-11-08
Attending: OBSTETRICS & GYNECOLOGY | Admitting: OBSTETRICS & GYNECOLOGY
Payer: COMMERCIAL

## 2022-11-08 DIAGNOSIS — Z12.31 VISIT FOR SCREENING MAMMOGRAM: ICD-10-CM

## 2022-11-08 PROCEDURE — 77067 SCR MAMMO BI INCL CAD: CPT

## 2022-11-09 NOTE — TELEPHONE ENCOUNTER
On 11/07/2022, Ms. Mylene Vasquez had diagnostic medial branch / dorsal ramus blocks of the bilateral L4-L5 and L5-S1 facet joints.  Ms. Vasquez reported at least 80% pain improvement lasting at least 6 hours after the injections.  Ms. Vasquez will be scheduled for confirmatory medial branch / dorsal ramus blocks of the bilateral L4-L5 and L5-S1 facet joints.    Laurent Montes MD        To: Mylene Vasquez      From: Laurent Montes MD      Created: 11/9/2022 8:44 AM        Ms. Mylene Vasquez,     That is great news.  We will get you scheduled for confirmatory medial branch / dorsal ramus blocks of the bilateral L4-L5 and L5-S1 facet joints.     Laurent Montes MD            To: [facility]      From: Mylenejoaquin Cunninghamon      Created: 11/7/2022 10:19 PM        *-*-*This message was handled on 11/9/2022 8:44 AM by LAURENT MONTES*-*-*    Here are my questions and answers     Mylene León

## 2022-11-15 ENCOUNTER — VIRTUAL VISIT (OUTPATIENT)
Dept: NEUROSURGERY | Facility: CLINIC | Age: 67
End: 2022-11-15
Payer: COMMERCIAL

## 2022-11-15 ENCOUNTER — TELEPHONE (OUTPATIENT)
Dept: NEUROLOGY | Facility: CLINIC | Age: 67
End: 2022-11-15

## 2022-11-15 DIAGNOSIS — Q28.8 SPINAL VASCULAR MALFORMATION: Primary | ICD-10-CM

## 2022-11-15 PROCEDURE — 99214 OFFICE O/P EST MOD 30 MIN: CPT | Mod: 95 | Performed by: RADIOLOGY

## 2022-11-15 NOTE — PROGRESS NOTES
Mylene is a 67 year old who is being evaluated via a billable video visit.      She is here to discuss further regarding her left paraspinous mass, this mass was seen to have several flow-voids on an MRI of the lumbar spine.  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    He had subsequently seen Dr. Perez from plastic surgery and has also been seen by Dr. Alcantar from interventional radiology.  The mass does not cause her any pain.  The real question is whether this mass is definitely an AVM or if it could be a slow-growing vascular tumor mass.  I discussed with her regarding this.  The only true way to figure this out is to perform a biopsy of the mass.  In order to safely perform the biopsy of the mass, the mass needs to be partially embolized so that blood flow can be reduced enough to permit biopsy.  I emphasized that complete embolization may result in the mass becoming very hard and uncomfortable, and could necessitate surgical removal of the mass.  Set surgical removal would be accompanied by significant weakness due to the extensive dissection of the paraspinous muscles that will be required.    Therefore at this time, possible embolization followed by a biopsy appears to be the best course of action.  In case the biopsy confirms that this is indeed an AVM, no further treatment is necessary for this mass in my opinion at this time.  If the biopsy were to confirm that this is not an AVM, we will need to discuss regarding treatment depending on what the pathology comes out to be.    I spoke to the patient regarding these issues extensively.  I also spoke to her about the possibility that the biopsy could lead to infection or that even though the assumed that partial embolization lead to a safe biopsy that could still be bleeding from the biopsy which necessitates  further embolization.  Both the patient and her  understand these scenarios very well.  They may obtain a second opinion.  I encouraged them to do so.    If we do proceed along these lines it would be in spring.  I think it is fine to wait that long given that this mass has been around for a while.    I spent about 30 minutes with the patient and her  in this video visit.  We will await the patient's decision, Tania will reach out to her today and the patient will let us know when there is a decision.

## 2022-11-15 NOTE — LETTER
11/15/2022       RE: Mylene Vasquez  [address] [place] [address]     Dear Colleague,    Thank you for referring your patient, Mylene Vasquez, to the [facility] [place] at [facility]. Please see a copy of my visit note below.    Mylene is a 67 year old who is being evaluated via a billable video visit.      She is here to discuss further regarding her left paraspinous mass, this mass was seen to have several flow-voids on an MRI of the lumbar spine.  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    He had subsequently seen Dr. Perez from plastic surgery and has also been seen by Dr. Alcantar from interventional radiology.  The mass does not cause her any pain.  The real question is whether this mass is definitely an AVM or if it could be a slow-growing vascular tumor mass.  I discussed with her regarding this.  The only true way to figure this out is to perform a biopsy of the mass.  In order to safely perform the biopsy of the mass, the mass needs to be partially embolized so that blood flow can be reduced enough to permit biopsy.  I emphasized that complete embolization may result in the mass becoming very hard and uncomfortable, and could necessitate surgical removal of the mass.  Set surgical removal would be accompanied by significant weakness due to the extensive dissection of the paraspinous muscles that will be required.    Therefore at this time, possible embolization followed by a biopsy appears to be the best course of action.  In case the biopsy confirms that this is indeed an AVM, no further treatment is necessary for this mass in my opinion at this time.  If the biopsy were to confirm that this is not an AVM, we will need to discuss regarding treatment depending on what the  pathology comes out to be.    I spoke to the patient regarding these issues extensively.  I also spoke to her about the possibility that the biopsy could lead to infection or that even though the assumed that partial embolization lead to a safe biopsy that could still be bleeding from the biopsy which necessitates further embolization.  Both the patient and her  understand these scenarios very well.  They may obtain a second opinion.  I encouraged them to do so.    If we do proceed along these lines it would be in spring.  I think it is fine to wait that long given that this mass has been around for a while.    I spent about 30 minutes with the patient and her  in this video visit.  We will await the patient's decision, Tania will reach out to her today and the patient will let us know when there is a decision.       Sincerely,    Xavier Lawrence MD

## 2022-11-15 NOTE — NURSING NOTE
Chief Complaint   Patient presents with     Video Visit     2 month follow up- Pt states she has questions

## 2022-11-15 NOTE — TELEPHONE ENCOUNTER
Per visit with Dr. Lawrence today, 11/15/22, patient may proceed with spinal angio with partial embolization and biopsy in the spring.     Spoke with patient. Provided RN contact number. Patient states she is uncertain whether she will proceed in the spring. She will give it some thought. Patient will call with any questions or concerns. RN will follow-up with patient in Feb-March (message sent). Tania Farias RN 11/15/2022 9:24 AM

## 2022-11-21 NOTE — PATIENT INSTRUCTIONS
We will plan to follow-up with you in the spring regarding proceeding with embolization and biopsy.    Stroke & Endovascular RN Care Coordinators:    Satnam Gao, JENNIFER Farias, RN, CNRN, SCRN    If you have any questions please contact the RN Care Coordinators at [phone], option 3.     After business hours call the  at [phone] and have the Neuro-Interventional Fellow paged.    Thank you for choosing [facility] for your health care needs.

## 2022-12-05 ENCOUNTER — HOSPITAL ENCOUNTER (OUTPATIENT)
Dept: GENERAL RADIOLOGY | Facility: CLINIC | Age: 67
Discharge: HOME | End: 2022-12-05
Attending: PHYSICAL MEDICINE & REHABILITATION | Admitting: PHYSICAL MEDICINE & REHABILITATION
Payer: COMMERCIAL

## 2022-12-05 VITALS — SYSTOLIC BLOOD PRESSURE: 162 MMHG | RESPIRATION RATE: 18 BRPM | HEART RATE: 80 BPM | DIASTOLIC BLOOD PRESSURE: 95 MMHG

## 2022-12-05 DIAGNOSIS — M47.817 FACET ARTHROPATHY, LUMBOSACRAL: Primary | ICD-10-CM

## 2022-12-05 PROCEDURE — 255N000002 HC RX 255 OP 636: Performed by: PHYSICAL MEDICINE & REHABILITATION

## 2022-12-05 PROCEDURE — 250N000009 HC RX 250

## 2022-12-05 PROCEDURE — 250N000011 HC RX IP 250 OP 636

## 2022-12-05 PROCEDURE — 64493 INJ PARAVERT F JNT L/S 1 LEV: CPT | Mod: 50 | Performed by: PHYSICAL MEDICINE & REHABILITATION

## 2022-12-05 PROCEDURE — 64493 INJ PARAVERT F JNT L/S 1 LEV: CPT | Mod: 50

## 2022-12-05 PROCEDURE — 64494 INJ PARAVERT F JNT L/S 2 LEV: CPT | Mod: 50 | Performed by: PHYSICAL MEDICINE & REHABILITATION

## 2022-12-05 RX ORDER — IOPAMIDOL 408 MG/ML
10 INJECTION, SOLUTION INTRATHECAL ONCE
Status: COMPLETED | OUTPATIENT
Start: 2022-12-05 | End: 2022-12-05

## 2022-12-05 RX ORDER — LIDOCAINE HYDROCHLORIDE 10 MG/ML
5 INJECTION, SOLUTION EPIDURAL; INFILTRATION; INTRACAUDAL; PERINEURAL ONCE
Status: COMPLETED | OUTPATIENT
Start: 2022-12-05 | End: 2022-12-05

## 2022-12-05 RX ORDER — LIDOCAINE HYDROCHLORIDE 10 MG/ML
INJECTION, SOLUTION EPIDURAL; INFILTRATION; INTRACAUDAL; PERINEURAL
Status: COMPLETED
Start: 2022-12-05 | End: 2022-12-05

## 2022-12-05 RX ADMIN — LIDOCAINE HYDROCHLORIDE 2 ML: 10 INJECTION, SOLUTION EPIDURAL; INFILTRATION; INTRACAUDAL; PERINEURAL at 14:07

## 2022-12-05 RX ADMIN — IOPAMIDOL 3 ML: 408 INJECTION, SOLUTION INTRATHECAL at 14:18

## 2022-12-05 RX ADMIN — LIDOCAINE HYDROCHLORIDE 3 ML: 20 INJECTION, SOLUTION EPIDURAL; INFILTRATION; INTRACAUDAL; PERINEURAL at 14:08

## 2022-12-05 NOTE — PROGRESS NOTES
Assisted MD Nielson in bilateral medial branch block. Medications given per MAR. Pain prior to procedure 5/10. Pain post procedure 0/10. Pt tolerated procedure well. Injection site covered with dressing. Dressing clean, dry and intact at time of discharge. Discharge instructions given and all questions answered. Pt left ambulatory in stable condition.

## 2022-12-06 ENCOUNTER — MYC MEDICAL ADVICE (OUTPATIENT)
Dept: ORTHOPEDICS | Facility: CLINIC | Age: 67
End: 2022-12-06

## 2022-12-07 DIAGNOSIS — M47.817 FACET ARTHROPATHY, LUMBOSACRAL: Primary | ICD-10-CM

## 2022-12-07 NOTE — TELEPHONE ENCOUNTER
On 11/07/2022, Ms. Mylene Vasquez had diagnostic medial branch / dorsal ramus blocks of the bilateral L4-L5 and L5-S1 facet joints.  Ms. Vasquez reported at least 80% pain improvement lasting at least 6 hours after the injections.    On 12/05/2022, Ms. Mylene Vasquez had confirmatory medial branch / dorsal ramus blocks of the bilateral L4-L5 and L5-S1 facet joints.  Ms. Vasquez reported at least 80% pain improvement lasting at least 6 hours after the injections.    Ms. Mylene Vasquez will be scheduled for radiofrequency ablations / neurotomies of the bilateral L4-L5 and L5-S1 facet joints.    Laurent Montes MD        To: Mylene Vasquez      From: Laurent Montes MD      Created: 12/7/2022 1:32 PM        Ms. Mylene Vasquez,     That is great news.  We will get you scheduled for radiofrequency ablations / neurotomies of the bilateral L4-L5 and L5-S1 facet joints.     Laurent Montes MD            To: [facility]      From: Mylene Vasquez      Created: 12/6/2022 10:28 AM        *-*-*This message was handled on 12/7/2022 1:32 PM by LAURENT MONTES*-*-*    Here is my form from yesterday  Thanks  Mylene

## 2022-12-07 NOTE — PROGRESS NOTES
On 11/07/2022, Ms. Mylene Vasquez had diagnostic medial branch / dorsal ramus blocks of the bilateral L4-L5 and L5-S1 facet joints.  Ms. Vasquez reported at least 80% pain improvement lasting at least 6 hours after the injections.    On 12/05/2022, Ms. Mylene Vasquez had confirmatory medial branch / dorsal ramus blocks of the bilateral L4-L5 and L5-S1 facet joints.  Ms. Vasquez reported at least 80% pain improvement lasting at least 6 hours after the injections.    Ms. Mylene Vasquez will be scheduled for radiofrequency ablations / neurotomies of the bilateral L4-L5 and L5-S1 facet joints.    Laurent Nielson MD

## 2022-12-09 NOTE — TELEPHONE ENCOUNTER
Patient is scheduled for procedure with Dr. Nielson    Spoke with: pt    Date of Procedure: 01-04-23    [place] [facility] [place]    Informed patient they will need an adult  yes    Pre-procedure COVID-19 Test: N/A    Additional comments: Arrival @ 11 am     Patient is aware pre-op RN will call 2-3 days prior to procedure with arrival time and instructions. yes      Iveth Juarez on 12/9/2022 at 10:15 AM

## 2023-01-11 ENCOUNTER — ANCILLARY PROCEDURE (OUTPATIENT)
Dept: RADIOLOGY | Facility: AMBULATORY SURGERY CENTER | Age: 68
End: 2023-01-11
Attending: PHYSICAL MEDICINE & REHABILITATION
Payer: COMMERCIAL

## 2023-01-11 ENCOUNTER — HOSPITAL ENCOUNTER (OUTPATIENT)
Facility: AMBULATORY SURGERY CENTER | Age: 68
Discharge: HOME | End: 2023-01-11
Attending: PHYSICAL MEDICINE & REHABILITATION | Admitting: PHYSICAL MEDICINE & REHABILITATION
Payer: COMMERCIAL

## 2023-01-11 VITALS
DIASTOLIC BLOOD PRESSURE: 103 MMHG | HEART RATE: 92 BPM | TEMPERATURE: 97.6 F | OXYGEN SATURATION: 98 % | RESPIRATION RATE: 16 BRPM | SYSTOLIC BLOOD PRESSURE: 157 MMHG

## 2023-01-11 DIAGNOSIS — R52 PAIN: ICD-10-CM

## 2023-01-11 DIAGNOSIS — M47.817 FACET ARTHROPATHY, LUMBOSACRAL: Primary | ICD-10-CM

## 2023-01-11 PROCEDURE — 64636 DESTROY L/S FACET JNT ADDL: CPT | Mod: 50 | Performed by: PHYSICAL MEDICINE & REHABILITATION

## 2023-01-11 PROCEDURE — 64635 DESTROY LUMB/SAC FACET JNT: CPT | Mod: RT

## 2023-01-11 PROCEDURE — 64635 DESTROY LUMB/SAC FACET JNT: CPT | Mod: 50 | Performed by: PHYSICAL MEDICINE & REHABILITATION

## 2023-01-11 RX ORDER — LIDOCAINE HYDROCHLORIDE 10 MG/ML
INJECTION, SOLUTION EPIDURAL; INFILTRATION; INTRACAUDAL; PERINEURAL DAILY PRN
Status: DISCONTINUED | OUTPATIENT
Start: 2023-01-11 | End: 2023-01-11 | Stop reason: HOSPADM

## 2023-01-11 RX ORDER — LIDOCAINE HYDROCHLORIDE 40 MG/ML
INJECTION, SOLUTION RETROBULBAR DAILY PRN
Status: DISCONTINUED | OUTPATIENT
Start: 2023-01-11 | End: 2023-01-11 | Stop reason: HOSPADM

## 2023-01-11 NOTE — DISCHARGE INSTRUCTIONS
"Home Care Instructions after a Radio Frequency Ablation      Activity  -Rest today  -Do not work today  -Resume normal activity in 2-3 days  -No heavy lifting, turning or twisting for 24 hours  -DO NOT shower or soak in tub for 24 hours  -DO NOT remove bandaid for 24 hours    Pain  -You may experience soreness at the injection site for one or two days  -You may use an ice pack for 20 minutes every 2 hours for the first 24 hours, longer if needed for comfort, do not use heat  -You may use Tylenol (acetaminophen) every 4 hours or other pain medicines as directed by your physician  -If other pain medications are prescribed by your physician, please follow dosing instructions carefully.    What to expect  You may experience numbness radiating into your legs or arms (depending on the procedure location). This numbness may last several hours. Until sensation returns to normal, please use caution in walking, climbing stairs, and stepping out of your vehicle, etc. Relief of your initial symptoms can take up to 4 weeks to feel better, this is normal and due to the healing process. The procedure site may feel like a deep burn. Using ice will greatly minimize this discomfort. Do not use numbing creams or patches over your injection sites immediately following your procedure. Please keep injection sites covered, clean and dry for 24 hours.      DID YOU RECEIVE STEROIDS TODAY?  {YES / NO:[id number]::\"Yes\"}    Common side effects of steroids:  Not everyone will experience corticosteroid side effects. If side effects are experienced, they will gradually subside in the 7-10 day period following an injection. Most common side effects include:  -Flushed face and/or chest  -Feeling of warmth, particularly in the face but could be an overall feeling of warmth  -Increased blood sugar in diabetic patients  -Menstrual irregularities my occur. If taking hormone-based birth control an alternate method of birth control is recommended  -Sleep " disturbances and/or mood swings are possible  -Leg cramps      Please contact us if you have:  -Severe pain  -Fever more than 101.5 degrees Fahrenheit  -Signs of infection at the injection site (redness, swelling, or drainage)    FOR PM&R PATIENTS:  For patients seen by the PM and R service, please call [phone]. (Monday through Friday 8a-5p.  After business hours and weekends call [phone] and ask for the PM and R doctor on call). If you need to fax a pain diary to PM&R the fax number is [phone]. If you are unable to fax, uploading to [facility] is encouraged, then send to provider. If you have procedure scheduling questions please call [phone] Option #2.

## 2023-01-11 NOTE — OP NOTE
PHYSICAL MEDICINE & REHABILITATION / MEDICAL SPINE      PROCEDURE DATE:  01/11/2023      PATIENT NAME:  Mylene Vasquez  MRN:  [id number]  YOB: 1955      PRE-PROCEDURE DIAGNOSIS:  1. Facet arthropathy, lumbosacral        POST-PROCEDURE DIAGNOSIS:  1. Facet arthropathy, lumbosacral        PROCEDURE:  Fluoroscopic-guided medial branch / dorsal ramus radiofrequency ablations / neurotomies of the bilateral L4-L5 and L5-S1 facet joints.  (CPT code/s:  48192, 25227)      INDICATIONS FOR PROCEDURE:   Ms. Mylene Vasquez is a 67 year old female with a clinical picture consistent with bilateral lumbosacral facet arthropathy as determined after two rounds of medial branch / dorsal ramus blocks with local anesthetic, extensive conversation with the patient, and evaluation of the patient's pain diaries after each medial branch / dorsal ramus block.      PROCEDURE IN DETAIL:  Prior to the procedure, educational material was provided for the patient to review and take home.  After a discussion of the risks, benefits, and alternatives to the procedure, the patient expressed understanding and wished to proceed.  Consent form was signed.  The patient was brought to the fluoroscopy suite and placed in the prone position.  Procedural pause was conducted to verify:  patient identity, procedure to be performed, laterality, site, and patient position.    The skin was sterilely prepped using chlorhexidine and allowed to dry.  The skin was draped in the usual sterile fashion.  After identifying the junctions of the transverse processes / sacral ala and superior articular processes for the medial branches / dorsal rami of the bilateral L4-L5 and L5-S1 facet joints with fluoroscopy, the skin was infiltrated with 1.5 ml 1% preservative-free lidocaine at each level.  Then, 18-gauge, 100 mm SMK needles with 10 mm active tips were advanced to the junctions of the transverse processes / sacral ala and superior articular processes  using fluoroscopic guidance.  Proper needle positioning was confirmed using multiple fluoroscopic views.      Next, sensory stimulation was performed at 50 Hz and up to 1 V with reproduction of sensation at 0.3 V.  Motor stimulation was then performed at 2 Hz and up to 2 V without any lower extremity stimulation observed (which was confirmed by the patient's self report).  Once the SMK needle position was confirmed and after negative aspiration, 1 ml 4% preservative-free lidocaine was injected at each level.    Radiofrequency ablation / neurotomy lesioning was carried out utilizing the following parameters:  80 C for 90 seconds.  After the first lesioning was completed, the SMK needles were rotated / withdrawn and noted to be still in the correct positions at the corresponding levels.  The second lesioning was then carried out at these positions utilizing the same parameters:  80 C for 90 seconds.  Following lesioning, the needles were removed.  The needle insertion sites were dressed appropriately.     The patient tolerated the procedure well, and there were no apparent complications.  The patient was escorted back to the postprocedure room.  The patient was monitored for side effects.  No reactions were noted.  After appropriate observation, the patient was dismissed from the postprocedure room in good condition.    Preprocedure pain level: 1/10.  Postprocedure pain level: 0/10.      Laurent Nielson MD

## 2023-04-17 ENCOUNTER — APPOINTMENT (OUTPATIENT)
Dept: URBAN - METROPOLITAN AREA CLINIC 253 | Age: 68
Setting detail: DERMATOLOGY
End: 2023-04-20

## 2023-04-17 VITALS — RESPIRATION RATE: 14 BRPM | HEIGHT: 62 IN | WEIGHT: 150 LBS

## 2023-04-17 DIAGNOSIS — L81.4 OTHER MELANIN HYPERPIGMENTATION: ICD-10-CM

## 2023-04-17 DIAGNOSIS — B07.8 OTHER VIRAL WARTS: ICD-10-CM

## 2023-04-17 DIAGNOSIS — L82.1 OTHER SEBORRHEIC KERATOSIS: ICD-10-CM

## 2023-04-17 DIAGNOSIS — D18.0 HEMANGIOMA: ICD-10-CM

## 2023-04-17 DIAGNOSIS — D22 MELANOCYTIC NEVI: ICD-10-CM

## 2023-04-17 DIAGNOSIS — Z71.89 OTHER SPECIFIED COUNSELING: ICD-10-CM

## 2023-04-17 DIAGNOSIS — D17 BENIGN LIPOMATOUS NEOPLASM: ICD-10-CM

## 2023-04-17 PROBLEM — D22.5 MELANOCYTIC NEVI OF TRUNK: Status: ACTIVE | Noted: 2023-04-17

## 2023-04-17 PROBLEM — D48.5 NEOPLASM OF UNCERTAIN BEHAVIOR OF SKIN: Status: ACTIVE | Noted: 2023-04-17

## 2023-04-17 PROBLEM — D18.01 HEMANGIOMA OF SKIN AND SUBCUTANEOUS TISSUE: Status: ACTIVE | Noted: 2023-04-17

## 2023-04-17 PROCEDURE — 99213 OFFICE O/P EST LOW 20 MIN: CPT | Mod: 25

## 2023-04-17 PROCEDURE — OTHER COUNSELING: OTHER

## 2023-04-17 PROCEDURE — 17110 DESTRUCT B9 LESION 1-14: CPT

## 2023-04-17 PROCEDURE — OTHER ADDITIONAL NOTES: OTHER

## 2023-04-17 PROCEDURE — OTHER MIPS QUALITY: OTHER

## 2023-04-17 PROCEDURE — OTHER LIQUID NITROGEN: OTHER

## 2023-04-17 ASSESSMENT — LOCATION ZONE DERM
LOCATION ZONE: ARM
LOCATION ZONE: FINGER
LOCATION ZONE: TRUNK

## 2023-04-17 ASSESSMENT — LOCATION DETAILED DESCRIPTION DERM
LOCATION DETAILED: LEFT ANTERIOR DISTAL UPPER ARM
LOCATION DETAILED: LEFT MEDIAL SUPERIOR CHEST
LOCATION DETAILED: INFERIOR THORACIC SPINE
LOCATION DETAILED: RIGHT MID RADIAL DORSAL INDEX FINGER

## 2023-04-17 ASSESSMENT — LOCATION SIMPLE DESCRIPTION DERM
LOCATION SIMPLE: UPPER BACK
LOCATION SIMPLE: LEFT UPPER ARM
LOCATION SIMPLE: CHEST
LOCATION SIMPLE: RIGHT INDEX FINGER

## 2023-04-17 NOTE — PROCEDURE: LIQUID NITROGEN
Show Aperture Variable?: Yes
Post-Care Instructions: I reviewed with the patient in detail post-care instructions. Patient is to wear sunprotection, and avoid picking at any of the treated lesions. Pt may apply Vaseline to crusted or scabbing areas.
Add 52 Modifier (Optional): no
Consent: The patient's consent was obtained including but not limited to risks of crusting, scabbing, blistering, scarring, darker or lighter pigmentary change, recurrence, incomplete removal and infection.
Duration Of Freeze Thaw-Cycle (Seconds): 2
Detail Level: Detailed
Medical Necessity Clause: This procedure was medically necessary because the lesions that were treated were:
Medical Necessity Information: It is in your best interest to select a reason for this procedure from the list below. All of these items fulfill various CMS LCD requirements except the new and changing color options.
Spray Paint Text: The liquid nitrogen was applied to the skin utilizing a spray paint frosting technique.
Number Of Freeze-Thaw Cycles: 3 freeze-thaw cycles

## 2023-04-17 NOTE — PROCEDURE: MIPS QUALITY
Quality 111:Pneumonia Vaccination Status For Older Adults: Pneumococcal vaccine (PPSV23) was not administered on or after patient’s 60th birthday and before the end of the measurement period, reason not otherwise specified
Quality 431: Preventive Care And Screening: Unhealthy Alcohol Use - Screening: Patient not identified as an unhealthy alcohol user when screened for unhealthy alcohol use using a systematic screening method
Quality 110: Preventive Care And Screening: Influenza Immunization: Influenza immunization was not ordered or administered, reason not given
Detail Level: Detailed
Quality 130: Documentation Of Current Medications In The Medical Record: Current Medications Documented
Quality 226: Preventive Care And Screening: Tobacco Use: Screening And Cessation Intervention: Patient screened for tobacco use and is an ex/non-smoker

## 2023-04-17 NOTE — PROCEDURE: ADDITIONAL NOTES
Additional Notes: Discussed that patient should get an ultrasound before excision.
Render Risk Assessment In Note?: no
Detail Level: Zone

## 2023-06-11 ENCOUNTER — HEALTH MAINTENANCE LETTER (OUTPATIENT)
Age: 68
End: 2023-06-11

## 2023-08-22 ENCOUNTER — TELEPHONE (OUTPATIENT)
Dept: ORTHOPEDICS | Facility: CLINIC | Age: 68
End: 2023-08-22
Payer: COMMERCIAL

## 2023-08-22 DIAGNOSIS — R22.2 PARASPINAL MASS: Primary | ICD-10-CM

## 2023-08-22 NOTE — TELEPHONE ENCOUNTER
[facility] Call Center    Phone Message    May a detailed message be left on voicemail: yes     Reason for Call: Patient has a malformation and needs an MRI w/Contrast Please give patient a call to discuss    Action Taken: [facility] [[id number]]    Travel Screening: Not Applicable

## 2023-08-22 NOTE — TELEPHONE ENCOUNTER
Ms. 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  Ms. Vasquez states that she was told to obtain follow-up imaging of this annually.  I am forwarding this message to Xavier Lawrence MD and LIZZIE Goldstein MD to order the appropriate follow-up imaging and to follow-up with Ms. 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    Laurent Nielson MD

## 2023-08-25 NOTE — TELEPHONE ENCOUNTER
Orders entered per Dr. Lawrence,     Please confirm that you want an MRA spinal canal and MRI T Spine w&wo.       Yes please lets get all of those.  BDJ       Message sent to scheduling.     Radha Junior RN 8/25/2023 8:37 AM

## 2023-08-29 ENCOUNTER — MYC REFILL (OUTPATIENT)
Dept: ORTHOPEDICS | Facility: CLINIC | Age: 68
End: 2023-08-29
Payer: COMMERCIAL

## 2023-08-29 DIAGNOSIS — M54.17 LUMBOSACRAL RADICULOPATHY: ICD-10-CM

## 2023-08-29 DIAGNOSIS — M47.817 FACET ARTHROPATHY, LUMBOSACRAL: ICD-10-CM

## 2023-08-30 RX ORDER — GABAPENTIN 300 MG/1
300 CAPSULE ORAL AT BEDTIME
Qty: 60 CAPSULE | Refills: 0 | Status: SHIPPED | OUTPATIENT
Start: 2023-08-30 | End: 2023-10-02

## 2023-08-30 NOTE — TELEPHONE ENCOUNTER
For Ms. Mylene DONALD Vasquez, signed prescription for gabapentin 300 mg p.o. nightly, dispense 60-day supply.    Laurent Nielson MD

## 2023-09-11 ENCOUNTER — HOSPITAL ENCOUNTER (OUTPATIENT)
Dept: MRI IMAGING | Facility: CLINIC | Age: 68
Discharge: HOME | End: 2023-09-11
Attending: RADIOLOGY | Admitting: RADIOLOGY
Payer: COMMERCIAL

## 2023-09-11 DIAGNOSIS — R22.2 PARASPINAL MASS: ICD-10-CM

## 2023-09-11 PROCEDURE — A9585 GADOBUTROL INJECTION: HCPCS | Performed by: RADIOLOGY

## 2023-09-11 PROCEDURE — 255N000002 HC RX 255 OP 636: Performed by: RADIOLOGY

## 2023-09-11 PROCEDURE — 72157 MRI CHEST SPINE W/O & W/DYE: CPT

## 2023-09-11 RX ORDER — GADOBUTROL 604.72 MG/ML
7 INJECTION INTRAVENOUS ONCE
Status: COMPLETED | OUTPATIENT
Start: 2023-09-11 | End: 2023-09-11

## 2023-09-11 RX ADMIN — GADOBUTROL 7 ML: 604.72 INJECTION INTRAVENOUS at 08:57

## 2023-09-26 ENCOUNTER — ANCILLARY PROCEDURE (OUTPATIENT)
Dept: MRI IMAGING | Facility: CLINIC | Age: 68
End: 2023-09-26
Attending: RADIOLOGY
Payer: COMMERCIAL

## 2023-09-26 DIAGNOSIS — R22.2 PARASPINAL MASS: ICD-10-CM

## 2023-09-26 PROCEDURE — 72159 MR ANGIO SPINE W/O&W/DYE: CPT | Performed by: STUDENT IN AN ORGANIZED HEALTH CARE EDUCATION/TRAINING PROGRAM

## 2023-09-26 PROCEDURE — A9585 GADOBUTROL INJECTION: HCPCS | Mod: JZ | Performed by: STUDENT IN AN ORGANIZED HEALTH CARE EDUCATION/TRAINING PROGRAM

## 2023-09-26 RX ORDER — GADOBUTROL 604.72 MG/ML
7.5 INJECTION INTRAVENOUS ONCE
Status: COMPLETED | OUTPATIENT
Start: 2023-09-26 | End: 2023-09-26

## 2023-09-26 RX ADMIN — GADOBUTROL 7.5 ML: 604.72 INJECTION INTRAVENOUS at 11:38

## 2023-10-02 ENCOUNTER — OFFICE VISIT (OUTPATIENT)
Dept: ORTHOPEDICS | Facility: CLINIC | Age: 68
End: 2023-10-02
Payer: COMMERCIAL

## 2023-10-02 VITALS
DIASTOLIC BLOOD PRESSURE: 96 MMHG | BODY MASS INDEX: 27.6 KG/M2 | SYSTOLIC BLOOD PRESSURE: 149 MMHG | HEIGHT: 62 IN | WEIGHT: 150 LBS | HEART RATE: 97 BPM | OXYGEN SATURATION: 99 %

## 2023-10-02 DIAGNOSIS — M54.17 LUMBOSACRAL RADICULOPATHY: Primary | ICD-10-CM

## 2023-10-02 DIAGNOSIS — M51.379 DDD (DEGENERATIVE DISC DISEASE), LUMBOSACRAL: ICD-10-CM

## 2023-10-02 DIAGNOSIS — R22.2 PARASPINAL MASS: ICD-10-CM

## 2023-10-02 DIAGNOSIS — M47.817 FACET ARTHROPATHY, LUMBOSACRAL: ICD-10-CM

## 2023-10-02 PROCEDURE — 99215 OFFICE O/P EST HI 40 MIN: CPT | Performed by: PHYSICAL MEDICINE & REHABILITATION

## 2023-10-02 RX ORDER — GABAPENTIN 300 MG/1
300 CAPSULE ORAL AT BEDTIME
Qty: 180 CAPSULE | Refills: 0 | Status: SHIPPED | OUTPATIENT
Start: 2023-10-02 | End: 2024-03-30

## 2023-10-02 ASSESSMENT — PAIN SCALES - GENERAL: PAINLEVEL: MILD PAIN (3)

## 2023-10-02 NOTE — PATIENT INSTRUCTIONS
Please schedule a follow-up appointment as needed.  You can schedule this at the , or you can call [phone].

## 2023-10-02 NOTE — LETTER
10/2/2023         RE: Mylene Vasquez  [address] [place] [address]        Dear Colleague,    Thank you for referring your patient, Mylene Vasquez, to the [facility]. Please see a copy of my visit note below.    PHYSICAL MEDICINE & REHABILITATION / MEDICAL SPINE        Date:  Oct 2, 2023    Name:  Mylene Vasquez  YOB: 1955  MRN:  [id number]          CHIEF COMPLAINT:  Low back pain.        HISTORY OF PRESENT ILLNESS:  Ms. Mylene Vasquez is a 68-year-old, right-hand-dominant, female.  She is retired.      Ms. Mylene Vasquez stated she has osteoarthritis in her feet.  She denied any other injuries of her low back, pelvis, hips, thighs, knees, legs, ankles, feet, toes.     Ms. Mylene Vasquez denied any personal or family history of autoimmune diseases, rheumatologic diseases, gout, pseudogout.  She denied any personal or family history of neurologic diseases.  Ms. Vasquez denied any personal or family history of inflammatory bowel diseases.    Ms. Mylene Vasquez  was last seen in this clinic on 10/28/2022.  On 01/11/2023, she had fluoroscopic-guided medial branch / dorsal ramus radiofrequency ablations / neurotomies of the bilateral L4-L5 and L5-S1 facet joints.  Ms. Vasquez returns to clinic today, 10/02/2023.  She is accompanied to today's appointment by her , Mr. Evan Vasquez.  They have 2 children and 5 grandchildren.  Ms. Vasquez walks for exercise.  They walk around the [facility].    Ms. 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  She does not notice any pain from this.    Ms. Mylene Vasquez reports that her low back pain was 80% improved with the radiofrequency ablations / neurotomies of the bilateral L4-L5 and L5-S1 facet joints.  Ms. Vasquez develops bilateral low back pain after 10 minutes of standing.  There is no radiation of this pain.  This pain is intermittent.  Ms. Vasquez feels that she is  able to do what she wants again.  She no longer finds her back pain limiting.    Ms. Mylene Vasquez notes for the past 2 years she develops pain in her left buttock that extends into her left thigh not past the knee.  There is numbness and tingling in this distribution.  The symptoms are improved with gabapentin 300 mg p.o. nightly.  This pain is rated as 2-3/10.  This pain has not affected by how Ms. Mylene Vasquez lies down at night.  Ms. Mylene Vasquez's left buttock and thigh pain sometimes keeps her awake and sometimes wakes her.    Ms. Mylene Vasquez's left buttock and left thigh pain does not change with coughing or sneezing.  Driving and hitting a pothole does not change her left buttock/thigh pain.    Ms. Mylene Vasquez has not tried acupuncture, chiropractor treatments, injections, massage, physical therapy.  She is currently taking gabapentin 300 mg nightly.    Ms. Mylene Vasquez denies any weakness.  She denies any give way episodes of her lower extremities.  She notes tripping/stumbling, which she attributes to not lifting her feet; she is not sure if this is more on one side than the other.  Ms. Vasquez denies any falls.  She does not use any assistive devices for ambulation.  Ms. Mylene Vasquez denies any other numbness, tingling, pins-and-needles.  She denies any saddle anesthesia, bowel incontinence, bladder incontinence.  Ms. Vasquez denies of bilateral extremities become weak and tired with walking.    Ms. Mylene Vasquez denies any prior or recent injuries of her low back, pelvis, hips, thighs, knees, legs, ankles, feet, toes.        ALLERGIES:  Allergies   Allergen Reactions    Bactrim [Sulfamethoxazole-Trimethoprim] Nausea and Vomiting and Diarrhea    Pcn [Penicillins]      itchy         MEDICATIONS:  Current Outpatient Medications   Medication Sig Dispense Refill    clomiPRAMINE (ANAFRANIL) 50 MG capsule every evening Take three tablets at bedtime      gabapentin (NEURONTIN) 300 MG capsule Take 1  capsule (300 mg) by mouth At Bedtime for 180 days 180 capsule 0    PARoxetine (PAXIL) 30 MG tablet Take by mouth every evening      VITAMIN D PO Take by mouth every 7 days           PAST MEDICAL HISTORY:  Past Medical History:   Diagnosis Date    Anxiety     Arthritis 7/2012    Chronic midline low back pain with right-sided sciatica     Depressive disorder     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         PAST SURGICAL HISTORY:  Past Surgical History:   Procedure Laterality Date    COLONOSCOPY N/A 11/26/2018    rpt 10 years ;  Surgeon: Ilsa Aguirre MD;  Location: [facility]    DESTRUCTION OF PARAVERTEBRAL FACET LUMBAR / SACRAL SINGLE Bilateral 1/11/2023    Procedure: radiofrequency ablations / neurotomies of the bilateral L4-L5 and L5-S1 facet joints;  Surgeon: Laurent Nielson MD;  Location: [facility] OR    INJECT EPIDURAL TRANSFORAMINAL LUMBAR / SACRAL SINGLE Left 9/28/2022    Procedure: Left L5-S1 and S1-S2 transforaminal epidural corticosteroid injections.;  Surgeon: Laurent Nielson MD;  Location: [facility] OR    IR SPINAL ANGIOGRAM  09/09/2022    URETHROPLASTY  1968         FAMILY HISTORY:  Family History   Problem Relation Age of Onset    Osteoporosis Mother     Hypertension Mother     Depression Mother     Anxiety Disorder Mother     Alcohol/Drug Father     Lung Cancer Father         Lung cancer from smoking    Anxiety Disorder Sister     Anxiety Disorder Sister     Colon Cancer No family hx of     Anesthesia Reaction No family hx of     Deep Vein Thrombosis (DVT) No family hx of     Cardiovascular No family hx of          SOCIAL HISTORY:  Social History     Socioeconomic History    Marital status:      Spouse name: Jayden    Number of children: 2    Years of education: Not on file    Highest education level: Not on file   Occupational History    Occupation: retired   Tobacco Use    Smoking status: Never    Smokeless tobacco: Never   Vaping Use    Vaping Use: Never used   Substance and Sexual Activity    Alcohol use: Not  Currently     Comment: very little    Drug use: Never    Sexual activity: Yes     Partners: Male   Other Topics Concern    Parent/sibling w/ CABG, MI or angioplasty before 65F 55M? No   Social History Narrative    Not on file     Social Determinants of Health     Financial Resource Strain: Low Risk  (9/26/2022)    Overall Financial Resource Strain (CARDIA)     Difficulty of Paying Living Expenses: Not very hard   Food Insecurity: No Food Insecurity (9/26/2022)    Hunger Vital Sign     Worried About Running Out of Food in the Last Year: Never true     Ran Out of Food in the Last Year: Never true   Transportation Needs: No Transportation Needs (9/26/2022)    PRAPARE - Transportation     Lack of Transportation (Medical): No     Lack of Transportation (Non-Medical): No   Physical Activity: Insufficiently Active (9/26/2022)    Exercise Vital Sign     Days of Exercise per Week: 5 days     Minutes of Exercise per Session: 20 min   Stress: No Stress Concern Present (9/26/2022)    [employer] [place] [employer] - Occupational Stress Questionnaire     Feeling of Stress : Only a little   Social Connections: Socially Integrated (9/26/2022)    Social Connection and Isolation Panel [NHANES]     Frequency of Communication with Friends and Family: More than three times a week     Frequency of Social Gatherings with Friends and Family: Three times a week     Attends Yazidism Services: More than 4 times per year     Active Member of Clubs or Organizations: Yes     Attends Club or Organization Meetings: Not on file     Marital Status:    Interpersonal Safety: Not on file   Housing Stability: Low Risk  (9/26/2022)    Housing Stability Vital Sign     Unable to Pay for Housing in the Last Year: No     Number of Places Lived in the Last Year: 1     Unstable Housing in the Last Year: No         PHYSICAL EXAMINATION:  Vitals:    10/02/23 1455   BP: (!) 149/96   Pulse: 97   SpO2: 99%   Weight: 68 kg (150 lb)   Height: 1.575  "m (5' 2\")       GENERAL:  No acute distress.  Pleasant and cooperative.   PSYCH:  Normal mood and affect.  HEAD:  Normocephalic.  SPEECH:  No dysarthria.  EYES:  No scleral icterus.  Wearing glasses.  EARS:  Hearing is intact to spoken voice.  NOSE:  Midline, symmetric, no rhinorrhea.  LUNGS:  No respiratory distress.  No increased work of breathing.  VASCULAR/PULSES:  Posterior tibial:  Right 2+.  Left 2+.  Dorsalis pedis:  Right 2+.  Left 2+.  LOWER EXTREMITIES: No clubbing, cyanosis, or edema bilaterally.    BALANCE AND GAIT: The patient has a reciprocal gait pattern without antalgia.  She is able to walk, heel walk, tandem walk without difficulty.  Double leg squat is performed with quadriceps dominant pattern and slight dynamic knee valgus bilaterally.    INSPECTION:  There is no erythema, ecchymosis, deformity, asymmetry, or abnormality of the low back.  There is an increased prominence of the left paraspinal musculature at the thoracolumbar junction.    LUMBOPELVIC PALPATION:  Lumbar Spinous Processes:  not tender.  Lumbar Paraspinals:  Right not tender.  Left not tender.  Posterior Superior Iliac Spine:  Right not tender.  Left not tender.  Superior Cluneal Nerves:  Right not tender.  Left not tender.  Iliac Crest:  Right not tender.  Left not tender.  Sacroiliac Joint:  Right not tender.  Left not tender.  Hip External Rotators:  Right not tender.  Left not tender.  Ischial Tuberosity:  Right not tender.  Left not tender.  Greater Trochanter:  Right not tender.  Left not tender.  Coccyx:  not tender.    THORACOLUMBAR RANGE OF MOTION:  Forward flexion (85 ): Mildly reduced range of motion, does not cause pain, does not cause radiating pain.  Extension (30 ):  Mildly reduced range of motion, does not cause pain, does not cause radiating pain.  Lateral bending right (30 ):  Mildly reduced range of motion, does not cause pain, does not cause radiating pain.  Lateral bending left (30 ):  Mildly reduced range of " motion, does not cause pain, does not cause radiating pain.  Twisting right with extension:  Mildly reduced range of motion, does not cause pain, does not cause radiating pain.  Twisting left with extension:  Mildly reduced range of motion, does not cause pain, does not cause radiating pain.    SACROILIAC RANGE OF MOTION:  Standing flexion:  Right -.  Left -.  Seated flexion:  Right -.  Left -.  One-leg stork (Gillet):  Right -.  Left -.  Sacroiliac joint dysfunction:  Right -.  Left -.    HIP RANGE OF MOTION:  Flexion (110 ):  Right 110 .  Left 110 .  Extension (30 ):  Right 30 .  Left 30 .  External rotation (45 ):  Right 45 .  Left 45 .  Internal rotation (35 ):  Right 35 .  Left 35 .    KNEE RANGE OF MOTION:  Extension (0 ):  Right 0 .  Left 0 .  Flexion (135 ):  Right 140 .  Left 140 .    STRENGTH:  Hip flexion:  Right 5/5.  Left 5/5.  Hip abduction:  Right 5/5.  Left 4+/5.  Hip external rotation:  Right 5/5.  Left 4+/5.  Hip extension:  Right 5/5.  Left 4+/5.  Knee extension:  Right 5/5.  Left 5/5.  Knee flexion:  Right 5/5.  Left 4/5.  Ankle dorsiflexion:  Right 5/5.  Left 5/5.  Great toe dorsiflexion:  Right 5/5.  Left 5/5.  Ankle plantar flexion:  Right 5/5.  Left 5/5.    SENSATION:  Intact to light touch along right L2, L3, L4, L5, S1, S2.  Intact to light touch along left L2, L3, L4, L5, S1, S2.    REFLEXES:  Patellar (L2-L4):  Right 2+.  Left 2+.  Medial hamstrings (L5-S1):  Right 2+.  Left 2+.  Achilles (S1-S2):  Right 2+.  Left 2+.  Babinski:  Right downgoing.  Left downgoing.  Forced ankle dorsiflexion (clonus):  Right 0 beats.  Left 0 beats.    LUMBOPELVIC SPECIAL TESTS:  Gapping / Distraction:  Right -.  Left -.  Log roll:  Right -.  Left -.  Straight-leg raise (Lasegue):  Right -.  Left -.  Crossed-straight leg raise:  Right -.  Left -.  Resisted straight leg raise:  Right -.  Left -.  MARLENI (Param):  Right -.  Left -.  FADIR:  Right -.  Left -.  Hip scour:  Right -.  Left -.  Lateral sacral  compression:  Right -.  Left -.  Clamshell:  Right -.  Left -.  Femoral nerve stretch:  Right -.  Left -.  Crossed femoral nerve stretch:  Right -.  Left -.  Ely s:  Right +.  Left +.  Yeoman s:  Right -.  Left -.  Midline sacral thrust:  Right -.  Left -.  Noble compression:  Right -.  Left -.        IMAGING:  MRI THORACIC SPINE WITHOUT CONTRAST  9/11/2023 9:30 AM      HISTORY: Paraspinal mass.     TECHNIQUE: Multiplanar multisequence MRI of the thoracic spine without contrast.     COMPARISON: MRI thoracic spine dated 5/26/2022 and 5/6/2022. Conventional diagnostic spinal angiography dated 9/9/2022.     FINDINGS: There is redemonstration of an irregular enhancing mass centered in the left posterior paraspinous musculature centered at the levels of T10-L1. As before, multiple prominent flow voids within the lesion are present. The lesion does not appear to be significantly changed in size compared to MRI exam of 5/6/2022, measuring approximately 44 mm transverse by 31 mm AP by up to 82 mm craniocaudal within the field of view. The lesion is again not entirely specific by MR imaging features, but may represent a vascular malformation given findings from previous diagnostic spinal angiogram.     Exaggeration of the thoracic kyphosis, as before. Sagittal alignment otherwise normal. Unchanged prominent Schmorl's node along the L1 inferior endplate. Shallow Schmorl's node/degenerative endplate irregularities elsewhere, as before. No new vertebral body height loss. Mild Modic type I degenerative endplate signal change at T7-T8 and T9-T10. Minimal Modic type I change along the posterior aspect of the inferior endplate of L1. Unchanged nonspecific small ovoid relatively circumscribed-appearing T2/STIR hyperintense enhancing lesion in the left T9 lamina measuring up to approximately 6-7 mm (series 9 image 42). Bone marrow signal otherwise appears normal.     Normal appearance of the thoracic spinal cord. There is mild  multilevel degenerative disc height loss. Small multilevel disc bulges are present. Unchanged central disc herniation at T4-T5. Unchanged shallow central protrusion at the T6-T7 level. Small central protrusion at T8-T9 appears unchanged. No significant spinal canal stenosis. Mild scattered degenerative facet disease. No high-grade neural foraminal narrowing seen.    IMPRESSION:  1. No significant interval change in size of the previously demonstrated irregular enhancing mass centered in the left posterior paraspinous musculature at the T10-L1 levels compared to prior MRI of 5/6/2022. Please see the body of the report for additional details.  2. Multilevel degenerative changes, as described. No high-grade spinal canal or neural foraminal stenosis.         ASSESSMENT/PLAN:  Ms. Mylene Vasquez is a 68-year-old, right-hand-dominant, female.  She does continue of some symptoms of a chronic left lumbosacral radiculopathy (L5, S1).  Discussed diagnosis, pathophysiology, and treatment options with Ms. and Mr. Vasquez.  Discussed the options of doing nothing/living with it, physical therapy, chiropractic care, oral medications such as gabapentin or pregabalin, lumbosacral epidural corticosteroid injection, referral to neurosurgery.  Ms. Vasquez is having good pain relief with gabapentin 300 mg p.o. nightly and would like to continue this medication.  She was given a 6-month prescription for this.    Ms. Mylene Vasquez has lumbosacral facet arthropathy that has been improved with radiofrequency ablations / neurotomies of the bilateral L4-L5 and L5-S1 facet joints.  She estimates that her low back pain is 80% improved from the radiofrequency ablation / neurotomies.  She is finding that she is able to do much more and is doing what she wants to do again.    Ms. Mylene Vasquez would like to follow-up in this clinic as needed.        Total Time on encounter:  58 minutes were spent on one more or more of the following:  discussion  with patient, history, exam, coordinating care, treatment goals, record review, documenting clinical information, and/or data review.      Laurent Nielson MD

## 2023-10-02 NOTE — PROGRESS NOTES
PHYSICAL MEDICINE & REHABILITATION / MEDICAL SPINE        Date:  Oct 2, 2023    Name:  Mylene Vasquez  YOB: 1955  MRN:  [id number]          CHIEF COMPLAINT:  Low back pain.        HISTORY OF PRESENT ILLNESS:  Ms. Mylene Vasquez is a 68-year-old, right-hand-dominant, female.  She is retired.      Ms. Mylene Vasquez stated she has osteoarthritis in her feet.  She denied any other injuries of her low back, pelvis, hips, thighs, knees, legs, ankles, feet, toes.     Ms. Mylene Vasquez denied any personal or family history of autoimmune diseases, rheumatologic diseases, gout, pseudogout.  She denied any personal or family history of neurologic diseases.  Ms. Vasquez denied any personal or family history of inflammatory bowel diseases.    Ms. Mylene Vasquez  was last seen in this clinic on 10/28/2022.  On 01/11/2023, she had fluoroscopic-guided medial branch / dorsal ramus radiofrequency ablations / neurotomies of the bilateral L4-L5 and L5-S1 facet joints.  Ms. Vasquez returns to clinic today, 10/02/2023.  She is accompanied to today's appointment by her , Mr. Evan Vasquez.  They have 2 children and 5 grandchildren.  Ms. Vasquez walks for exercise.  They walk around the [facility].    Ms. 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  She does not notice any pain from this.    Ms. Mylene Vasquez reports that her low back pain was 80% improved with the radiofrequency ablations / neurotomies of the bilateral L4-L5 and L5-S1 facet joints.  Ms. Vasquez develops bilateral low back pain after 10 minutes of standing.  There is no radiation of this pain.  This pain is intermittent.  Ms. Vasquez feels that she is able to do what she wants again.  She no longer finds her back pain limiting.    Ms. Mylene Vasquez notes for the past 2 years she develops pain in her left buttock that extends into her left thigh not past the knee.  There is numbness and tingling  in this distribution.  The symptoms are improved with gabapentin 300 mg p.o. nightly.  This pain is rated as 2-3/10.  This pain has not affected by how Ms. Mylene Vasquez lies down at night.  Ms. Mylene Vasquez's left buttock and thigh pain sometimes keeps her awake and sometimes wakes her.    Ms. Mylene Vasquez's left buttock and left thigh pain does not change with coughing or sneezing.  Driving and hitting a pothole does not change her left buttock/thigh pain.    Ms. Mylene Vasquez has not tried acupuncture, chiropractor treatments, injections, massage, physical therapy.  She is currently taking gabapentin 300 mg nightly.    Ms. Mylene Vasquez denies any weakness.  She denies any give way episodes of her lower extremities.  She notes tripping/stumbling, which she attributes to not lifting her feet; she is not sure if this is more on one side than the other.  Ms. Vasquez denies any falls.  She does not use any assistive devices for ambulation.  Ms. Mylene Vasquez denies any other numbness, tingling, pins-and-needles.  She denies any saddle anesthesia, bowel incontinence, bladder incontinence.  Ms. Vasquez denies of bilateral extremities become weak and tired with walking.    Ms. Mylene Vasquez denies any prior or recent injuries of her low back, pelvis, hips, thighs, knees, legs, ankles, feet, toes.        ALLERGIES:  Allergies   Allergen Reactions    Bactrim [Sulfamethoxazole-Trimethoprim] Nausea and Vomiting and Diarrhea    Pcn [Penicillins]      itchy         MEDICATIONS:  Current Outpatient Medications   Medication Sig Dispense Refill    clomiPRAMINE (ANAFRANIL) 50 MG capsule every evening Take three tablets at bedtime      gabapentin (NEURONTIN) 300 MG capsule Take 1 capsule (300 mg) by mouth At Bedtime for 180 days 180 capsule 0    PARoxetine (PAXIL) 30 MG tablet Take by mouth every evening      VITAMIN D PO Take by mouth every 7 days           PAST MEDICAL HISTORY:  Past Medical History:   Diagnosis Date     Anxiety     Arthritis 7/2012    Chronic midline low back pain with right-sided sciatica     Depressive disorder     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         PAST SURGICAL HISTORY:  Past Surgical History:   Procedure Laterality Date    COLONOSCOPY N/A 11/26/2018    rpt 10 years ;  Surgeon: Ilsa Aguirre MD;  Location: [facility]    DESTRUCTION OF PARAVERTEBRAL FACET LUMBAR / SACRAL SINGLE Bilateral 1/11/2023    Procedure: radiofrequency ablations / neurotomies of the bilateral L4-L5 and L5-S1 facet joints;  Surgeon: Laurent Nielson MD;  Location: [facility] OR    INJECT EPIDURAL TRANSFORAMINAL LUMBAR / SACRAL SINGLE Left 9/28/2022    Procedure: Left L5-S1 and S1-S2 transforaminal epidural corticosteroid injections.;  Surgeon: Laurent Nielson MD;  Location: [facility] OR    IR SPINAL ANGIOGRAM  09/09/2022    URETHROPLASTY  1968         FAMILY HISTORY:  Family History   Problem Relation Age of Onset    Osteoporosis Mother     Hypertension Mother     Depression Mother     Anxiety Disorder Mother     Alcohol/Drug Father     Lung Cancer Father         Lung cancer from smoking    Anxiety Disorder Sister     Anxiety Disorder Sister     Colon Cancer No family hx of     Anesthesia Reaction No family hx of     Deep Vein Thrombosis (DVT) No family hx of     Cardiovascular No family hx of          SOCIAL HISTORY:  Social History     Socioeconomic History    Marital status:      Spouse name: Jayden    Number of children: 2    Years of education: Not on file    Highest education level: Not on file   Occupational History    Occupation: retired   Tobacco Use    Smoking status: Never    Smokeless tobacco: Never   Vaping Use    Vaping Use: Never used   Substance and Sexual Activity    Alcohol use: Not Currently     Comment: very little    Drug use: Never    Sexual activity: Yes     Partners: Male   Other Topics Concern    Parent/sibling w/ CABG, MI or angioplasty before 65F 55M? No   Social History Narrative    Not on file     Social Determinants of  "Health     Financial Resource Strain: Low Risk  (9/26/2022)    Overall Financial Resource Strain (CARDIA)     Difficulty of Paying Living Expenses: Not very hard   Food Insecurity: No Food Insecurity (9/26/2022)    Hunger Vital Sign     Worried About Running Out of Food in the Last Year: Never true     Ran Out of Food in the Last Year: Never true   Transportation Needs: No Transportation Needs (9/26/2022)    PRAPARE - Transportation     Lack of Transportation (Medical): No     Lack of Transportation (Non-Medical): No   Physical Activity: Insufficiently Active (9/26/2022)    Exercise Vital Sign     Days of Exercise per Week: 5 days     Minutes of Exercise per Session: 20 min   Stress: No Stress Concern Present (9/26/2022)    [place] [employer] - Occupational Stress Questionnaire     Feeling of Stress : Only a little   Social Connections: Socially Integrated (9/26/2022)    Social Connection and Isolation Panel [NHANES]     Frequency of Communication with Friends and Family: More than three times a week     Frequency of Social Gatherings with Friends and Family: Three times a week     Attends Jewish Services: More than 4 times per year     Active Member of Clubs or Organizations: Yes     Attends Club or Organization Meetings: Not on file     Marital Status:    Interpersonal Safety: Not on file   Housing Stability: Low Risk  (9/26/2022)    Housing Stability Vital Sign     Unable to Pay for Housing in the Last Year: No     Number of Places Lived in the Last Year: 1     Unstable Housing in the Last Year: No         PHYSICAL EXAMINATION:  Vitals:    10/02/23 1455   BP: (!) 149/96   Pulse: 97   SpO2: 99%   Weight: 68 kg (150 lb)   Height: 1.575 m (5' 2\")       GENERAL:  No acute distress.  Pleasant and cooperative.   PSYCH:  Normal mood and affect.  HEAD:  Normocephalic.  SPEECH:  No dysarthria.  EYES:  No scleral icterus.  Wearing glasses.  EARS:  Hearing is intact to spoken voice.  NOSE:  " Midline, symmetric, no rhinorrhea.  LUNGS:  No respiratory distress.  No increased work of breathing.  VASCULAR/PULSES:  Posterior tibial:  Right 2+.  Left 2+.  Dorsalis pedis:  Right 2+.  Left 2+.  LOWER EXTREMITIES: No clubbing, cyanosis, or edema bilaterally.    BALANCE AND GAIT: The patient has a reciprocal gait pattern without antalgia.  She is able to walk, heel walk, tandem walk without difficulty.  Double leg squat is performed with quadriceps dominant pattern and slight dynamic knee valgus bilaterally.    INSPECTION:  There is no erythema, ecchymosis, deformity, asymmetry, or abnormality of the low back.  There is an increased prominence of the left paraspinal musculature at the thoracolumbar junction.    LUMBOPELVIC PALPATION:  Lumbar Spinous Processes:  not tender.  Lumbar Paraspinals:  Right not tender.  Left not tender.  Posterior Superior Iliac Spine:  Right not tender.  Left not tender.  Superior Cluneal Nerves:  Right not tender.  Left not tender.  Iliac Crest:  Right not tender.  Left not tender.  Sacroiliac Joint:  Right not tender.  Left not tender.  Hip External Rotators:  Right not tender.  Left not tender.  Ischial Tuberosity:  Right not tender.  Left not tender.  Greater Trochanter:  Right not tender.  Left not tender.  Coccyx:  not tender.    THORACOLUMBAR RANGE OF MOTION:  Forward flexion (85 ): Mildly reduced range of motion, does not cause pain, does not cause radiating pain.  Extension (30 ):  Mildly reduced range of motion, does not cause pain, does not cause radiating pain.  Lateral bending right (30 ):  Mildly reduced range of motion, does not cause pain, does not cause radiating pain.  Lateral bending left (30 ):  Mildly reduced range of motion, does not cause pain, does not cause radiating pain.  Twisting right with extension:  Mildly reduced range of motion, does not cause pain, does not cause radiating pain.  Twisting left with extension:  Mildly reduced range of motion, does not  cause pain, does not cause radiating pain.    SACROILIAC RANGE OF MOTION:  Standing flexion:  Right -.  Left -.  Seated flexion:  Right -.  Left -.  One-leg stork (Gillet):  Right -.  Left -.  Sacroiliac joint dysfunction:  Right -.  Left -.    HIP RANGE OF MOTION:  Flexion (110 ):  Right 110 .  Left 110 .  Extension (30 ):  Right 30 .  Left 30 .  External rotation (45 ):  Right 45 .  Left 45 .  Internal rotation (35 ):  Right 35 .  Left 35 .    KNEE RANGE OF MOTION:  Extension (0 ):  Right 0 .  Left 0 .  Flexion (135 ):  Right 140 .  Left 140 .    STRENGTH:  Hip flexion:  Right 5/5.  Left 5/5.  Hip abduction:  Right 5/5.  Left 4+/5.  Hip external rotation:  Right 5/5.  Left 4+/5.  Hip extension:  Right 5/5.  Left 4+/5.  Knee extension:  Right 5/5.  Left 5/5.  Knee flexion:  Right 5/5.  Left 4/5.  Ankle dorsiflexion:  Right 5/5.  Left 5/5.  Great toe dorsiflexion:  Right 5/5.  Left 5/5.  Ankle plantar flexion:  Right 5/5.  Left 5/5.    SENSATION:  Intact to light touch along right L2, L3, L4, L5, S1, S2.  Intact to light touch along left L2, L3, L4, L5, S1, S2.    REFLEXES:  Patellar (L2-L4):  Right 2+.  Left 2+.  Medial hamstrings (L5-S1):  Right 2+.  Left 2+.  Achilles (S1-S2):  Right 2+.  Left 2+.  Babinski:  Right downgoing.  Left downgoing.  Forced ankle dorsiflexion (clonus):  Right 0 beats.  Left 0 beats.    LUMBOPELVIC SPECIAL TESTS:  Gapping / Distraction:  Right -.  Left -.  Log roll:  Right -.  Left -.  Straight-leg raise (Lasegue):  Right -.  Left -.  Crossed-straight leg raise:  Right -.  Left -.  Resisted straight leg raise:  Right -.  Left -.  FABERE (Param):  Right -.  Left -.  FADIR:  Right -.  Left -.  Hip scour:  Right -.  Left -.  Lateral sacral compression:  Right -.  Left -.  Clamshell:  Right -.  Left -.  Femoral nerve stretch:  Right -.  Left -.  Crossed femoral nerve stretch:  Right -.  Left -.  Ely s:  Right +.  Left +.  Yeoman s:  Right -.  Left -.  Midline sacral thrust:  Right -.  Left  -.  Gold compression:  Right -.  Left -.        IMAGING:  MRI THORACIC SPINE WITHOUT CONTRAST  9/11/2023 9:30 AM      HISTORY: Paraspinal mass.     TECHNIQUE: Multiplanar multisequence MRI of the thoracic spine without contrast.     COMPARISON: MRI thoracic spine dated 5/26/2022 and 5/6/2022. Conventional diagnostic spinal angiography dated 9/9/2022.     FINDINGS: There is redemonstration of an irregular enhancing mass centered in the left posterior paraspinous musculature centered at the levels of T10-L1. As before, multiple prominent flow voids within the lesion are present. The lesion does not appear to be significantly changed in size compared to MRI exam of 5/6/2022, measuring approximately 44 mm transverse by 31 mm AP by up to 82 mm craniocaudal within the field of view. The lesion is again not entirely specific by MR imaging features, but may represent a vascular malformation given findings from previous diagnostic spinal angiogram.     Exaggeration of the thoracic kyphosis, as before. Sagittal alignment otherwise normal. Unchanged prominent Schmorl's node along the L1 inferior endplate. Shallow Schmorl's node/degenerative endplate irregularities elsewhere, as before. No new vertebral body height loss. Mild Modic type I degenerative endplate signal change at T7-T8 and T9-T10. Minimal Modic type I change along the posterior aspect of the inferior endplate of L1. Unchanged nonspecific small ovoid relatively circumscribed-appearing T2/STIR hyperintense enhancing lesion in the left T9 lamina measuring up to approximately 6-7 mm (series 9 image 42). Bone marrow signal otherwise appears normal.     Normal appearance of the thoracic spinal cord. There is mild multilevel degenerative disc height loss. Small multilevel disc bulges are present. Unchanged central disc herniation at T4-T5. Unchanged shallow central protrusion at the T6-T7 level. Small central protrusion at T8-T9 appears unchanged. No significant spinal  canal stenosis. Mild scattered degenerative facet disease. No high-grade neural foraminal narrowing seen.    IMPRESSION:  1. No significant interval change in size of the previously demonstrated irregular enhancing mass centered in the left posterior paraspinous musculature at the T10-L1 levels compared to prior MRI of 5/6/2022. Please see the body of the report for additional details.  2. Multilevel degenerative changes, as described. No high-grade spinal canal or neural foraminal stenosis.         ASSESSMENT/PLAN:  Ms. Mylene Vasquez is a 68-year-old, right-hand-dominant, female.  She does continue of some symptoms of a chronic left lumbosacral radiculopathy (L5, S1).  Discussed diagnosis, pathophysiology, and treatment options with Ms. and Mr. Vasquez.  Discussed the options of doing nothing/living with it, physical therapy, chiropractic care, oral medications such as gabapentin or pregabalin, lumbosacral epidural corticosteroid injection, referral to neurosurgery.  Ms. Vasquez is having good pain relief with gabapentin 300 mg p.o. nightly and would like to continue this medication.  She was given a 6-month prescription for this.    Ms. Mylene Vasquez has lumbosacral facet arthropathy that has been improved with radiofrequency ablations / neurotomies of the bilateral L4-L5 and L5-S1 facet joints.  She estimates that her low back pain is 80% improved from the radiofrequency ablation / neurotomies.  She is finding that she is able to do much more and is doing what she wants to do again.    Ms. Mylene Vasquez would like to follow-up in this clinic as needed.        Total Time on encounter:  58 minutes were spent on one more or more of the following:  discussion with patient, history, exam, coordinating care, treatment goals, record review, documenting clinical information, and/or data review.      Laurent Nielson MD

## 2023-10-03 ENCOUNTER — VIRTUAL VISIT (OUTPATIENT)
Dept: NEUROSURGERY | Facility: CLINIC | Age: 68
End: 2023-10-03
Payer: COMMERCIAL

## 2023-10-03 DIAGNOSIS — Q28.8 AVM (ARTERIOVENOUS MALFORMATION) SPINE: Primary | ICD-10-CM

## 2023-10-03 PROCEDURE — 99441 PR PHYSICIAN TELEPHONE EVALUATION 5-10 MIN: CPT | Mod: 95 | Performed by: RADIOLOGY

## 2023-10-03 NOTE — NURSING NOTE
Is the patient currently in the state of [place]? YES    Visit mode:TELEPHONE    If the visit is dropped, the patient can be reconnected by: TELEPHONE VISIT: Phone number: [phone]    Will anyone else be joining the visit? NO  (If patient encounters technical issues they should call [phone])    How would you like to obtain your AVS? MyChart    Are changes needed to the allergy or medication list? Pt stated no med changes    Reason for visit: Telephone (Spinal vascular malformation)    Sheyla SERRANO

## 2023-10-03 NOTE — PROGRESS NOTES
Virtual Visit Details    Type of service:  Telephone Visit   Phone call duration: 9 minutes       Subjective:: Mylene Vasquez is a 68 year old female with a past medical history of left Paraspinous mass with flow voids on MRI. 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 Discussions about biopsy with embolization prior to reduce bleeding were had last year.   She denies any weakness, numbness, or tingling, she has some back pain but it isn't bad. She takes Gabapentin that is helping. She denies any issues of incontinence of retention.       Past medical history:   Past Medical History:   Diagnosis Date    Anxiety     Arthritis 7/2012    Chronic midline low back pain with right-sided sciatica     Depressive disorder     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        Current outpatient Medication list:   Current Outpatient Medications:     clomiPRAMINE (ANAFRANIL) 50 MG capsule, every evening Take three tablets at bedtime, Disp: , Rfl:     gabapentin (NEURONTIN) 300 MG capsule, Take 1 capsule (300 mg) by mouth At Bedtime for 180 days, Disp: 180 capsule, Rfl: 0    PARoxetine (PAXIL) 30 MG tablet, Take by mouth every evening, Disp: , Rfl:     VITAMIN D PO, Take by mouth every 7 days, Disp: , Rfl:       Family history:   Family History   Problem Relation Age of Onset    Osteoporosis Mother     Hypertension Mother     Depression Mother     Anxiety Disorder Mother     Alcohol/Drug Father     Lung Cancer Father         Lung cancer from smoking    Anxiety Disorder Sister     Anxiety Disorder Sister     Colon Cancer No family hx of     Anesthesia Reaction No family hx of     Deep Vein Thrombosis (DVT) No family hx of     Cardiovascular No family hx of        Social history:   Social History     Tobacco Use    Smoking status: Never    Smokeless tobacco: Never   Vaping Use    Vaping Use: Never used   Substance Use Topics    Alcohol use: Not  Currently     Comment: very little    Drug use: Never       Allergies:    Allergies   Allergen Reactions    Bactrim [Sulfamethoxazole-Trimethoprim] Nausea and Vomiting and Diarrhea    Pcn [Penicillins]      itchy       Review of Systems: 5 point ROS performed and negative except for detailed above    Objective:    Telephone visit: Physical is limited due to telephone visit, patient is awake, conversational and has had no additional complaints or concerns.       Imaging Reviewed:      MRI/MRA T spine 9/26/23: Left posterior paraspinous musculature arteriovenous  malformation with arterial supply from left T10-T11 intercostal  arteries and venous drainage to worsening left azygous venous system  with a small draining vein towards the left renal vein.      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 It has remained overall stable in size since last year. Discussions about different treatment routes involving biopsy and embolization prior to were had last year. She has remained asymptomatic besides some mild back pain that is controlled with Gabapentin and denies any weakness, numbness, urinary incontinence or retention. Given the stability of the lesion and lack of symptoms continued monitoring is recommended at this time.     Thank you for this referral, for any questions or concerns please don't hesitate to contact us.     Plan:  - Repeat MRA/MRI T spine in 1 year  - Return to clinic after repeat imaging    Emily Winkler MD  Endovascular Surgical Neuroradiology Fellow  [facility]  [phone]    Endovascular Surgical Neuroradiology staff is Dr. Lawrence

## 2023-10-03 NOTE — LETTER
10/3/2023       RE: Mylene Vasquez  [address] [place] [address]     Dear Colleague,    Thank you for referring your patient, Mylene Vasquez, to the [facility] [place] at [facility]. Please see a copy of my visit note below.    Virtual Visit Details    Type of service:  Telephone Visit   Phone call duration: 9 minutes       Subjective:: Mylene Vasquez is a 68 year old female with a past medical history of left Paraspinous mass with flow voids on MRI. 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 Discussions about biopsy with embolization prior to reduce bleeding were had last year.   She denies any weakness, numbness, or tingling, she has some back pain but it isn't bad. She takes Gabapentin that is helping. She denies any issues of incontinence of retention.       Past medical history:   Past Medical History:   Diagnosis Date    Anxiety     Arthritis 7/2012    Chronic midline low back pain with right-sided sciatica     Depressive disorder     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        Current outpatient Medication list:   Current Outpatient Medications:     clomiPRAMINE (ANAFRANIL) 50 MG capsule, every evening Take three tablets at bedtime, Disp: , Rfl:     gabapentin (NEURONTIN) 300 MG capsule, Take 1 capsule (300 mg) by mouth At Bedtime for 180 days, Disp: 180 capsule, Rfl: 0    PARoxetine (PAXIL) 30 MG tablet, Take by mouth every evening, Disp: , Rfl:     VITAMIN D PO, Take by mouth every 7 days, Disp: , Rfl:       Family history:   Family History   Problem Relation Age of Onset    Osteoporosis Mother     Hypertension Mother     Depression Mother     Anxiety Disorder Mother     Alcohol/Drug Father     Lung Cancer Father         Lung cancer from smoking    Anxiety Disorder Sister     Anxiety Disorder Sister     Colon Cancer No family hx of      Anesthesia Reaction No family hx of     Deep Vein Thrombosis (DVT) No family hx of     Cardiovascular No family hx of        Social history:   Social History     Tobacco Use    Smoking status: Never    Smokeless tobacco: Never   Vaping Use    Vaping Use: Never used   Substance Use Topics    Alcohol use: Not Currently     Comment: very little    Drug use: Never       Allergies:    Allergies   Allergen Reactions    Bactrim [Sulfamethoxazole-Trimethoprim] Nausea and Vomiting and Diarrhea    Pcn [Penicillins]      itchy       Review of Systems: 5 point ROS performed and negative except for detailed above    Objective:    Telephone visit: Physical is limited due to telephone visit, patient is awake, conversational and has had no additional complaints or concerns.       Imaging Reviewed:      MRI/MRA T spine 9/26/23: Left posterior paraspinous musculature arteriovenous  malformation with arterial supply from left T10-T11 intercostal  arteries and venous drainage to worsening left azygous venous system  with a small draining vein towards the left renal vein.      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 It has remained overall stable in size since last year. Discussions about different treatment routes involving biopsy and embolization prior to were had last year. She has remained asymptomatic besides some mild back pain that is controlled with Gabapentin and denies any weakness, numbness, urinary incontinence or retention. Given the stability of the lesion and lack of symptoms continued monitoring is recommended at this time.     Thank you for this referral, for any questions or concerns please don't hesitate to contact us.     Plan:  - Repeat MRA/MRI T spine in 1 year  - Return to clinic after repeat imaging      Endovascular Surgical Neuroradiology staff is Dr. Lawrence      Attestation signed by Howard  Xavier Brunson MD at 10/4/2023  9:06 AM:  I have personally spoken to the patient on October 3, 2023  and I agree with the note by Dr. Winkler                On October 4, 2023      Again, thank you for allowing me to participate in the care of your patient.      Sincerely,    Xavier Lawrence MD

## 2023-10-03 NOTE — NURSING NOTE
Is the patient currently in the state of [place]? YES    Visit mode:VIDEO    If the visit is dropped, the patient can be reconnected by: TELEPHONE VISIT: Phone number:   Telephone Information:   Mobile [phone]   Mobile [phone]       Will anyone else be joining the visit? NO  (If patient encounters technical issues they should call [phone])    How would you like to obtain your AVS? MyChart    Are changes needed to the allergy or medication list? Pt stated no med changes    Reason for visit: Telephone (Spinal vascular malformation)    Sheyla SERRANO

## 2023-10-03 NOTE — PATIENT INSTRUCTIONS
Please follow up with Dr. Lawrence in 1 year with MRA prior.     Stroke & Endovascular RN Care Coordinators:    Radha Junior, RN, BSN  Tania Farias, RN, CNRN, SCRN    If you have any questions please contact the RN Care Coordinators at [phone], option 1.     After business hours call the  at [phone] and have the Neuro-Interventional Fellow paged.    Thank you for choosing [facility] for your health care needs.

## 2023-11-09 ENCOUNTER — HOSPITAL ENCOUNTER (OUTPATIENT)
Dept: MAMMOGRAPHY | Facility: CLINIC | Age: 68
Discharge: HOME | End: 2023-11-09
Attending: OBSTETRICS & GYNECOLOGY | Admitting: OBSTETRICS & GYNECOLOGY
Payer: COMMERCIAL

## 2023-11-09 DIAGNOSIS — Z12.31 VISIT FOR SCREENING MAMMOGRAM: ICD-10-CM

## 2023-11-09 PROCEDURE — 77067 SCR MAMMO BI INCL CAD: CPT

## 2024-04-22 ENCOUNTER — APPOINTMENT (OUTPATIENT)
Dept: URBAN - METROPOLITAN AREA CLINIC 253 | Age: 69
Setting detail: DERMATOLOGY
End: 2024-04-27

## 2024-04-22 VITALS — HEIGHT: 62 IN | RESPIRATION RATE: 14 BRPM | WEIGHT: 145 LBS

## 2024-04-22 DIAGNOSIS — D22 MELANOCYTIC NEVI: ICD-10-CM

## 2024-04-22 DIAGNOSIS — L82.1 OTHER SEBORRHEIC KERATOSIS: ICD-10-CM

## 2024-04-22 DIAGNOSIS — L71.0 PERIORAL DERMATITIS: ICD-10-CM

## 2024-04-22 DIAGNOSIS — L82.0 INFLAMED SEBORRHEIC KERATOSIS: ICD-10-CM

## 2024-04-22 DIAGNOSIS — Z71.89 OTHER SPECIFIED COUNSELING: ICD-10-CM

## 2024-04-22 DIAGNOSIS — D18.0 HEMANGIOMA: ICD-10-CM

## 2024-04-22 DIAGNOSIS — L81.4 OTHER MELANIN HYPERPIGMENTATION: ICD-10-CM

## 2024-04-22 PROBLEM — D18.01 HEMANGIOMA OF SKIN AND SUBCUTANEOUS TISSUE: Status: ACTIVE | Noted: 2024-04-22

## 2024-04-22 PROBLEM — D22.5 MELANOCYTIC NEVI OF TRUNK: Status: ACTIVE | Noted: 2024-04-22

## 2024-04-22 PROCEDURE — OTHER ADDITIONAL NOTES: OTHER

## 2024-04-22 PROCEDURE — OTHER LIQUID NITROGEN: OTHER

## 2024-04-22 PROCEDURE — OTHER MIPS QUALITY: OTHER

## 2024-04-22 PROCEDURE — 99213 OFFICE O/P EST LOW 20 MIN: CPT | Mod: 25

## 2024-04-22 PROCEDURE — OTHER COUNSELING: OTHER

## 2024-04-22 PROCEDURE — OTHER PRESCRIPTION: OTHER

## 2024-04-22 PROCEDURE — 17110 DESTRUCT B9 LESION 1-14: CPT

## 2024-04-22 RX ORDER — MINOCYCLINE HYDROCHLORIDE 100 MG/1
100MG CAPSULE ORAL AS DIRECTED
Qty: 42 | Refills: 0 | Status: ERX | COMMUNITY
Start: 2024-04-22

## 2024-04-22 RX ORDER — METRONIDAZOLE 7.5 MG/G
0.75% CREAM TOPICAL BID
Qty: 45 | Refills: 2 | Status: ERX | COMMUNITY
Start: 2024-04-22

## 2024-04-22 ASSESSMENT — LOCATION DETAILED DESCRIPTION DERM
LOCATION DETAILED: RIGHT PROXIMAL PRETIBIAL REGION
LOCATION DETAILED: LEFT MEDIAL SUPERIOR CHEST
LOCATION DETAILED: LEFT INFERIOR CENTRAL MALAR CHEEK
LOCATION DETAILED: RIGHT INFERIOR LATERAL NECK
LOCATION DETAILED: SUPERIOR LUMBAR SPINE
LOCATION DETAILED: LEFT PROXIMAL PRETIBIAL REGION
LOCATION DETAILED: INFERIOR THORACIC SPINE

## 2024-04-22 ASSESSMENT — LOCATION SIMPLE DESCRIPTION DERM
LOCATION SIMPLE: LEFT PRETIBIAL REGION
LOCATION SIMPLE: RIGHT ANTERIOR NECK
LOCATION SIMPLE: UPPER BACK
LOCATION SIMPLE: RIGHT PRETIBIAL REGION
LOCATION SIMPLE: LOWER BACK
LOCATION SIMPLE: CHEST
LOCATION SIMPLE: LEFT CHEEK

## 2024-04-22 ASSESSMENT — LOCATION ZONE DERM
LOCATION ZONE: FACE
LOCATION ZONE: LEG
LOCATION ZONE: TRUNK
LOCATION ZONE: NECK

## 2024-04-22 NOTE — HPI: FULL BODY SKIN EXAMINATION
What Is The Reason For Today's Visit?: Full Body Skin Examination
What Is The Reason For Today's Visit? (Being Monitored For X): concerning skin lesions on an annual basis
Additional History: She has a dark lesion on the chest that she would like to remove.\\nShe has pimples around the mouth that popped up within the last 6 weeks or so. The area gets more red when she cleanses her face.

## 2024-04-22 NOTE — PROCEDURE: ADDITIONAL NOTES
Render Risk Assessment In Note?: no
Additional Notes: Discussed potential triggers, including toothpaste, wearing masks, stress, etc. \\nInstructed patient to avoid any active ingredients such as retinols until the condition resolves. Instructed patient to avoid scrubbing or exfoliating the face until the condition resolves. \\nInformed patient that she can begin with topical treatment alone but it may take a long time to resolve. Recommended topical treatment in addition to an oral antibiotic for faster improvement/resolution.\\nInformed patient that chlorine could be an irritant. She can continue swimming as desired.
Detail Level: Zone

## 2024-06-20 ENCOUNTER — TELEPHONE (OUTPATIENT)
Dept: FAMILY MEDICINE | Facility: CLINIC | Age: 69
End: 2024-06-20
Payer: COMMERCIAL

## 2024-06-20 NOTE — TELEPHONE ENCOUNTER
Patient Quality Outreach    Patient is due for the following:   Physical Annual Wellness Visit      Topic Date Due    Diptheria Tetanus Pertussis (DTAP/TDAP/TD) Vaccine (2 - Td or Tdap) 05/30/2011    Zoster (Shingles) Vaccine (3 of 3) 11/13/2020    Pneumococcal Vaccine (1 of 1 - PCV) Never done    COVID-19 Vaccine (4 - 2023-24 season) 09/01/2023       Next Steps:   Schedule a Annual Wellness Visit    Type of outreach:    Sent [employer] message.    Next Steps:  Reach out within 90 days via Phone.    Max number of attempts reached: No. Will try again in 90 days if patient still on fail list.    Questions for provider review:    None           Suzanna Rios MA

## 2024-08-04 ENCOUNTER — HEALTH MAINTENANCE LETTER (OUTPATIENT)
Age: 69
End: 2024-08-04

## 2024-09-21 ENCOUNTER — TELEPHONE (OUTPATIENT)
Dept: RADIOLOGY | Facility: CLINIC | Age: 69
End: 2024-09-21

## 2024-09-21 NOTE — TELEPHONE ENCOUNTER
Patient Contacted she will call back and schedule the following:    [facility]  Provider: Howard  Appointment type: return vascular  Appointment mode: any  Return date: 10/3/24    Specialty phone number: [phone]    Is Imaging Needed: Yes - MR/MRA  Imaging Phone Number to provide to patient: [phone]    Additional Notes: 1 yr follow up, MRI/MRA prior

## 2024-09-26 ENCOUNTER — RX ONLY (RX ONLY)
Age: 69
End: 2024-09-26

## 2024-09-26 ENCOUNTER — APPOINTMENT (OUTPATIENT)
Dept: URBAN - METROPOLITAN AREA CLINIC 253 | Age: 69
Setting detail: DERMATOLOGY
End: 2024-09-30

## 2024-09-26 VITALS — RESPIRATION RATE: 14 BRPM | WEIGHT: 150 LBS | HEIGHT: 62 IN

## 2024-09-26 DIAGNOSIS — L71.0 PERIORAL DERMATITIS: ICD-10-CM

## 2024-09-26 PROCEDURE — OTHER MIPS QUALITY: OTHER

## 2024-09-26 PROCEDURE — 99213 OFFICE O/P EST LOW 20 MIN: CPT

## 2024-09-26 PROCEDURE — OTHER COUNSELING: OTHER

## 2024-09-26 PROCEDURE — OTHER ADDITIONAL NOTES: OTHER

## 2024-09-26 PROCEDURE — OTHER PRESCRIPTION MEDICATION MANAGEMENT: OTHER

## 2024-09-26 RX ORDER — MINOCYCLINE HYDROCHLORIDE 100 MG/1
100MG CAPSULE ORAL AS DIRECTED
Qty: 42 | Refills: 0 | Status: ERX

## 2024-09-26 ASSESSMENT — LOCATION SIMPLE DESCRIPTION DERM: LOCATION SIMPLE: LEFT CHEEK

## 2024-09-26 ASSESSMENT — LOCATION DETAILED DESCRIPTION DERM: LOCATION DETAILED: LEFT INFERIOR CENTRAL MALAR CHEEK

## 2024-09-26 ASSESSMENT — LOCATION ZONE DERM: LOCATION ZONE: FACE

## 2024-09-26 NOTE — PROCEDURE: PRESCRIPTION MEDICATION MANAGEMENT
Continue Regimen: Metronidazole gel BID
Render In Strict Bullet Format?: No
Detail Level: Zone
Initiate Treatment: Minocycline 100 mg BID x 2 weeks, QD x 2 weeks (refills sent today)

## 2024-09-26 NOTE — PROCEDURE: ADDITIONAL NOTES
Render Risk Assessment In Note?: no
Additional Notes: Reassured her that this is not contagious.
Detail Level: Zone
Cold/Sinus

## 2024-10-23 ENCOUNTER — HOSPITAL ENCOUNTER (OUTPATIENT)
Dept: MRI IMAGING | Facility: CLINIC | Age: 69
Discharge: HOME | End: 2024-10-23
Attending: STUDENT IN AN ORGANIZED HEALTH CARE EDUCATION/TRAINING PROGRAM
Payer: COMMERCIAL

## 2024-10-23 DIAGNOSIS — Q28.8 AVM (ARTERIOVENOUS MALFORMATION) SPINE: ICD-10-CM

## 2024-10-23 PROCEDURE — 72157 MRI CHEST SPINE W/O & W/DYE: CPT

## 2024-10-23 PROCEDURE — 72157 MRI CHEST SPINE W/O & W/DYE: CPT | Mod: 26 | Performed by: RADIOLOGY

## 2024-10-23 PROCEDURE — 255N000002 HC RX 255 OP 636: Performed by: STUDENT IN AN ORGANIZED HEALTH CARE EDUCATION/TRAINING PROGRAM

## 2024-10-23 PROCEDURE — 72159 MR ANGIO SPINE W/O&W/DYE: CPT

## 2024-10-23 PROCEDURE — A9585 GADOBUTROL INJECTION: HCPCS | Performed by: STUDENT IN AN ORGANIZED HEALTH CARE EDUCATION/TRAINING PROGRAM

## 2024-10-23 RX ORDER — GADOBUTROL 604.72 MG/ML
7.5 INJECTION INTRAVENOUS ONCE
Status: COMPLETED | OUTPATIENT
Start: 2024-10-23 | End: 2024-10-23

## 2024-10-23 RX ADMIN — GADOBUTROL 7.5 ML: 604.72 INJECTION INTRAVENOUS at 10:12

## 2024-10-29 ENCOUNTER — VIRTUAL VISIT (OUTPATIENT)
Dept: NEUROSURGERY | Facility: CLINIC | Age: 69
End: 2024-10-29
Payer: COMMERCIAL

## 2024-10-29 DIAGNOSIS — Q28.8 AVM (ARTERIOVENOUS MALFORMATION) SPINE: Primary | ICD-10-CM

## 2024-10-29 PROCEDURE — 99441 PR PHYSICIAN TELEPHONE EVALUATION 5-10 MIN: CPT | Mod: 95 | Performed by: RADIOLOGY

## 2024-10-29 NOTE — LETTER
10/29/2024       RE: Mylene Vasquez  [address] [place] [address]     Dear Colleague,    Thank you for referring your patient, Mylene Vasquez, to the [facility] [place] at [facility]. Please see a copy of my visit note below.    Virtual Visit Details    Type of service:  Telephone Visit   Phone call duration: 9 minutes       Subjective:: Mylene Vasquez is a 68 year old female with a past medical history of left Paraspinous mass with flow voids on MRI. 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 Discussions about biopsy with embolization prior to reduce bleeding were had last year.   She denies any weakness, numbness, or tingling, she has some back pain but it isn't bad. She takes Gabapentin that is helping. She denies any issues of incontinence of retention.     Interval history:  Accompanied by  Jayden Vasquez, she has been doing well. She has no new symptoms or concerns, no weakness, tingling, numbness, no bowel or bladder loss.     Past medical history:   Past Medical History:   Diagnosis Date     Anxiety      Arthritis 7/2012     Chronic midline low back pain with right-sided sciatica      Depressive disorder      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        Current outpatient Medication list:   Current Outpatient Medications:      clomiPRAMINE (ANAFRANIL) 50 MG capsule, every evening Take three tablets at bedtime, Disp: , Rfl:      gabapentin (NEURONTIN) 300 MG capsule, Take 1 capsule (300 mg) by mouth At Bedtime for 180 days, Disp: 180 capsule, Rfl: 0     PARoxetine (PAXIL) 30 MG tablet, Take by mouth every evening, Disp: , Rfl:      VITAMIN D PO, Take by mouth every 7 days, Disp: , Rfl:       Family history:   Family History   Problem Relation Age of Onset     Osteoporosis Mother      Hypertension Mother      Depression Mother       Anxiety Disorder Mother      Alcohol/Drug Father      Lung Cancer Father         Lung cancer from smoking     Anxiety Disorder Sister      Anxiety Disorder Sister      Colon Cancer No family hx of      Anesthesia Reaction No family hx of      Deep Vein Thrombosis (DVT) No family hx of      Cardiovascular No family hx of        Social history:   Social History     Tobacco Use     Smoking status: Never     Smokeless tobacco: Never   Vaping Use     Vaping status: Never Used   Substance Use Topics     Alcohol use: Not Currently     Comment: very little     Drug use: Never       Allergies:    Allergies   Allergen Reactions     Bactrim [Sulfamethoxazole-Trimethoprim] Nausea and Vomiting and Diarrhea     Pcn [Penicillins]      itchy       Review of Systems: 5 point ROS performed and negative except for detailed above    Objective:  Constitutional: Awake, no acute distress  HENT: normcephalic,   Respiratory: normal rate, no acute distress  Neuro:  Mental status: awake, oriented   CN: EOMI, face movements symmetric, no dysarthria, hearing intact  Motor: 5/5 strength in upper extremities bilaterally        Imaging Reviewed:  MRI/MRA T spine 2024 1. Stable left posterior paraspinous arteriovenous malformation  compared to 9/26/2023.  2. Stable mild multilevel degenerative changes without high-grade  neural foraminal narrowing or spinal canal stenosis.        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 It has remained overall stable in size since last year. Discussions about different treatment routes involving biopsy and embolization prior to were had last year. She has remained asymptomatic besides some mild back pain that is controlled with Gabapentin and denies any weakness, numbness, urinary incontinence or retention. Given the stability of the lesion and lack of symptoms continued monitoring is recommended at this time.      Thank you for this referral, for any questions or concerns please don't hesitate to contact us.     Plan:  - Repeat MRA/MRI T spine in 1 year  - Return to clinic after repeat imaging    Emily Winkler MD  Endovascular Surgical Neuroradiology Fellow  [facility]  [phone]    Endovascular Surgical Neuroradiology staff is Dr. Lawrence                Attestation signed by Xavier Lawrence MD at 10/29/2024  8:54 AM:  I personally spoke with the patient and I agree with the note by Dr. Winkler dated 10/29/24      Again, thank you for allowing me to participate in the care of your patient.      Sincerely,    Xavier Lawrence MD

## 2024-10-29 NOTE — PATIENT INSTRUCTIONS
Please follow-up with Dr. Lawrence in 2 years with MRI/MRA T Spine    Stroke & Endovascular RN Care Coordinators:    Radha Antunez, RN, BSN  Tania Farias, RN, CNRN, SCRN    If you have any questions please contact the RN Care Coordinators at [phone], option 1.    After business hours call the  at [phone] and have the Neuro-Interventional Fellow paged.    Thank you for choosing [facility] for your health care needs.

## 2024-10-29 NOTE — PROGRESS NOTES
Virtual Visit Details    Type of service:  Telephone Visit   Phone call duration: 9 minutes       Subjective:: Mylene Vasquez is a 68 year old female with a past medical history of left Paraspinous mass with flow voids on MRI. 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 Discussions about biopsy with embolization prior to reduce bleeding were had last year.   She denies any weakness, numbness, or tingling, she has some back pain but it isn't bad. She takes Gabapentin that is helping. She denies any issues of incontinence of retention.     Interval history:  Accompanied by  Jayden Vasquez, she has been doing well. She has no new symptoms or concerns, no weakness, tingling, numbness, no bowel or bladder loss.     Past medical history:   Past Medical History:   Diagnosis Date    Anxiety     Arthritis 7/2012    Chronic midline low back pain with right-sided sciatica     Depressive disorder     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        Current outpatient Medication list:   Current Outpatient Medications:     clomiPRAMINE (ANAFRANIL) 50 MG capsule, every evening Take three tablets at bedtime, Disp: , Rfl:     gabapentin (NEURONTIN) 300 MG capsule, Take 1 capsule (300 mg) by mouth At Bedtime for 180 days, Disp: 180 capsule, Rfl: 0    PARoxetine (PAXIL) 30 MG tablet, Take by mouth every evening, Disp: , Rfl:     VITAMIN D PO, Take by mouth every 7 days, Disp: , Rfl:       Family history:   Family History   Problem Relation Age of Onset    Osteoporosis Mother     Hypertension Mother     Depression Mother     Anxiety Disorder Mother     Alcohol/Drug Father     Lung Cancer Father         Lung cancer from smoking    Anxiety Disorder Sister     Anxiety Disorder Sister     Colon Cancer No family hx of     Anesthesia Reaction No family hx of     Deep Vein Thrombosis (DVT) No family hx of     Cardiovascular No family hx of        Social  history:   Social History     Tobacco Use    Smoking status: Never    Smokeless tobacco: Never   Vaping Use    Vaping status: Never Used   Substance Use Topics    Alcohol use: Not Currently     Comment: very little    Drug use: Never       Allergies:    Allergies   Allergen Reactions    Bactrim [Sulfamethoxazole-Trimethoprim] Nausea and Vomiting and Diarrhea    Pcn [Penicillins]      itchy       Review of Systems: 5 point ROS performed and negative except for detailed above    Objective:  Constitutional: Awake, no acute distress  HENT: normcephalic,   Respiratory: normal rate, no acute distress  Neuro:  Mental status: awake, oriented   CN: EOMI, face movements symmetric, no dysarthria, hearing intact  Motor: 5/5 strength in upper extremities bilaterally        Imaging Reviewed:  MRI/MRA T spine 2024 1. Stable left posterior paraspinous arteriovenous malformation  compared to 9/26/2023.  2. Stable mild multilevel degenerative changes without high-grade  neural foraminal narrowing or spinal canal stenosis.        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 It has remained overall stable in size since last year. Discussions about different treatment routes involving biopsy and embolization prior to were had last year. She has remained asymptomatic besides some mild back pain that is controlled with Gabapentin and denies any weakness, numbness, urinary incontinence or retention. Given the stability of the lesion and lack of symptoms continued monitoring is recommended at this time.     Thank you for this referral, for any questions or concerns please don't hesitate to contact us.     Plan:  - Repeat MRA/MRI T spine in 1 year  - Return to clinic after repeat imaging    Emily Winkler MD  Endovascular Surgical Neuroradiology Fellow  [facility]  [phone]    Endovascular Surgical Neuroradiology staff is   Howard

## 2024-11-12 ENCOUNTER — HOSPITAL ENCOUNTER (OUTPATIENT)
Dept: MAMMOGRAPHY | Facility: CLINIC | Age: 69
Discharge: HOME | End: 2024-11-12
Attending: OBSTETRICS & GYNECOLOGY | Admitting: OBSTETRICS & GYNECOLOGY
Payer: COMMERCIAL

## 2024-11-12 DIAGNOSIS — Z12.31 VISIT FOR SCREENING MAMMOGRAM: ICD-10-CM

## 2024-11-12 PROCEDURE — 77063 BREAST TOMOSYNTHESIS BI: CPT

## 2024-12-04 ENCOUNTER — LAB REQUISITION (OUTPATIENT)
Dept: LAB | Facility: CLINIC | Age: 69
End: 2024-12-04
Payer: COMMERCIAL

## 2024-12-04 DIAGNOSIS — R30.0 DYSURIA: ICD-10-CM

## 2024-12-04 PROCEDURE — 87186 SC STD MICRODIL/AGAR DIL: CPT | Mod: ORL | Performed by: NURSE PRACTITIONER

## 2024-12-04 PROCEDURE — 87086 URINE CULTURE/COLONY COUNT: CPT | Mod: ORL | Performed by: NURSE PRACTITIONER

## 2024-12-05 LAB — BACTERIA UR CULT: ABNORMAL

## 2025-04-23 ENCOUNTER — APPOINTMENT (OUTPATIENT)
Dept: URBAN - METROPOLITAN AREA CLINIC 253 | Age: 70
Setting detail: DERMATOLOGY
End: 2025-04-23

## 2025-04-23 VITALS — HEIGHT: 62 IN | RESPIRATION RATE: 14 BRPM | WEIGHT: 150 LBS

## 2025-04-23 DIAGNOSIS — D22 MELANOCYTIC NEVI: ICD-10-CM

## 2025-04-23 DIAGNOSIS — L81.4 OTHER MELANIN HYPERPIGMENTATION: ICD-10-CM

## 2025-04-23 DIAGNOSIS — D18.0 HEMANGIOMA: ICD-10-CM

## 2025-04-23 DIAGNOSIS — Z71.89 OTHER SPECIFIED COUNSELING: ICD-10-CM

## 2025-04-23 DIAGNOSIS — L82.1 OTHER SEBORRHEIC KERATOSIS: ICD-10-CM

## 2025-04-23 DIAGNOSIS — D17 BENIGN LIPOMATOUS NEOPLASM: ICD-10-CM

## 2025-04-23 PROBLEM — D18.01 HEMANGIOMA OF SKIN AND SUBCUTANEOUS TISSUE: Status: ACTIVE | Noted: 2025-04-23

## 2025-04-23 PROBLEM — D17.22 BENIGN LIPOMATOUS NEOPLASM OF SKIN AND SUBCUTANEOUS TISSUE OF LEFT ARM: Status: ACTIVE | Noted: 2025-04-23

## 2025-04-23 PROBLEM — D22.5 MELANOCYTIC NEVI OF TRUNK: Status: ACTIVE | Noted: 2025-04-23

## 2025-04-23 PROCEDURE — OTHER MIPS QUALITY: OTHER

## 2025-04-23 PROCEDURE — 99213 OFFICE O/P EST LOW 20 MIN: CPT

## 2025-04-23 PROCEDURE — OTHER COUNSELING: OTHER

## 2025-04-23 ASSESSMENT — LOCATION SIMPLE DESCRIPTION DERM
LOCATION SIMPLE: UPPER BACK
LOCATION SIMPLE: LOWER BACK
LOCATION SIMPLE: LEFT UPPER ARM

## 2025-04-23 ASSESSMENT — LOCATION ZONE DERM
LOCATION ZONE: ARM
LOCATION ZONE: TRUNK

## 2025-04-23 ASSESSMENT — LOCATION DETAILED DESCRIPTION DERM
LOCATION DETAILED: LEFT ANTERIOR PROXIMAL UPPER ARM
LOCATION DETAILED: INFERIOR THORACIC SPINE
LOCATION DETAILED: SUPERIOR LUMBAR SPINE

## 2025-04-23 NOTE — PROCEDURE: COUNSELING
Detail Level: Generalized
Detail Level: Detailed
Patient Specific Counseling (Will Not Stick From Patient To Patient): She is having right rotator cuff surgery on May 25, 2025.
Patient Specific Counseling (Will Not Stick From Patient To Patient): Discussed excision if it becomes symptomatic.

## 2025-04-23 NOTE — HPI: FULL BODY SKIN EXAMINATION
How Severe Are Your Spot(S)?: mild
What Type Of Note Output Would You Prefer (Optional)?: Standard Output
What Is The Reason For Today's Visit?: Full Body Skin Examination
What Is The Reason For Today's Visit? (Being Monitored For X): concerning skin lesions on an annual basis
Additional History: She has no concerns today.

## 2025-04-29 ENCOUNTER — OFFICE VISIT (OUTPATIENT)
Dept: INTERNAL MEDICINE | Facility: CLINIC | Age: 70
End: 2025-04-29
Payer: COMMERCIAL

## 2025-04-29 VITALS
WEIGHT: 153 LBS | TEMPERATURE: 97 F | HEART RATE: 101 BPM | SYSTOLIC BLOOD PRESSURE: 120 MMHG | DIASTOLIC BLOOD PRESSURE: 82 MMHG | OXYGEN SATURATION: 97 % | BODY MASS INDEX: 28.89 KG/M2 | RESPIRATION RATE: 16 BRPM | HEIGHT: 61 IN

## 2025-04-29 DIAGNOSIS — F41.1 GENERALIZED ANXIETY DISORDER: ICD-10-CM

## 2025-04-29 DIAGNOSIS — M75.101 NONTRAUMATIC TEAR OF RIGHT ROTATOR CUFF, UNSPECIFIED TEAR EXTENT: ICD-10-CM

## 2025-04-29 DIAGNOSIS — Z01.818 PRE-OP EXAM: Primary | ICD-10-CM

## 2025-04-29 DIAGNOSIS — M54.17 LUMBOSACRAL RADICULOPATHY: ICD-10-CM

## 2025-04-29 LAB
ERYTHROCYTE [DISTWIDTH] IN BLOOD BY AUTOMATED COUNT: 13 % (ref 10–15)
HCT VFR BLD AUTO: 38.7 % (ref 35–47)
HGB BLD-MCNC: 13.1 G/DL (ref 11.7–15.7)
MCH RBC QN AUTO: 28.8 PG (ref 26.5–33)
MCHC RBC AUTO-ENTMCNC: 33.9 G/DL (ref 31.5–36.5)
MCV RBC AUTO: 85 FL (ref 78–100)
PLATELET # BLD AUTO: 312 10E3/UL (ref 150–450)
RBC # BLD AUTO: 4.55 10E6/UL (ref 3.8–5.2)
WBC # BLD AUTO: 6.5 10E3/UL (ref 4–11)

## 2025-04-29 PROCEDURE — 3079F DIAST BP 80-89 MM HG: CPT

## 2025-04-29 PROCEDURE — 80048 BASIC METABOLIC PNL TOTAL CA: CPT

## 2025-04-29 PROCEDURE — 99214 OFFICE O/P EST MOD 30 MIN: CPT

## 2025-04-29 PROCEDURE — 36415 COLL VENOUS BLD VENIPUNCTURE: CPT

## 2025-04-29 PROCEDURE — 3074F SYST BP LT 130 MM HG: CPT

## 2025-04-29 PROCEDURE — 85027 COMPLETE CBC AUTOMATED: CPT

## 2025-04-29 RX ORDER — GABAPENTIN 300 MG/1
300 TABLET, FILM COATED ORAL
COMMUNITY

## 2025-04-29 NOTE — PROGRESS NOTES
Preoperative Evaluation  [facility]  [address]  [address]  [facility] [address]  Phone: [phone]  Primary Provider: Charley Mathis MD  Pre-op Performing Provider: FRANKLIN Allison CNP  Apr 29, 2025 4/29/2025   Surgical Information   What procedure is being done? rotater cuff surgery   Facility or Hospital where procedure/surgery will be performed: [facility]   Who is doing the procedure / surgery? maria brenner md   Date of surgery / procedure: may 20, 2025   Time of surgery / procedure: to be determined   Where do you plan to recover after surgery? at home with family     Fax number for surgical facility: [phone]    Assessment & Plan     The proposed surgical procedure is considered INTERMEDIATE risk.    (Z01.818) Pre-op exam  (primary encounter diagnosis)  Comment: Okay to proceed with procedure as planned  Plan: CBC with platelets, Basic metabolic panel  (Ca,        Cl, CO2, Creat, Gluc, K, Na, BUN)            (M75.101) Nontraumatic tear of right rotator cuff, unspecified tear extent  Comment: She is followed by [facility] orthopedics, Dr. Brenner and has been found to have a high grade subscapularis tear. Her symptoms have been refractory to conservative treatment.   Plan:     (M54.17) Lumbosacral radiculopathy  Comment: Stable with use of Gabapentin 300MG at bedtime.   Plan:     (F41.1) Generalized anxiety disorder  Comment: Stable with Paxil 30MG and Clomipramine 150MG at bedtime.   Plan:          - No identified additional risk factors other than previously addressed    Antiplatelet or Anticoagulation Medication Instructions   - We reviewed the medication list and the patient is not on an antiplatelet or anticoagulation medications.    Additional Medication Instructions  Take all scheduled medications on the day of surgery    Recommendation  Approval given to proceed with proposed procedure, without further diagnostic  evaluation.        Madelaine Chakraborty is a 70 year old, presenting for the following:  Pre-Op Exam          4/29/2025    12:52 PM   Additional Questions   Roomed by Alisia KRUGER: Pt with history of anxiety and lumbosacral radiculopathy presents for pre operative exam for rotator cuff repair on 5/20/25.   She is followed by [facility] orthopedics, Dr. Brenner and has been found to have a high grade subscapularis tear. Her symptoms have been refractory to conservative treatment.         4/29/2025   Pre-Op Questionnaire   Have you ever had a heart attack or stroke? No   Have you ever had surgery on your heart or blood vessels, such as a stent placement, a coronary artery bypass, or surgery on an artery in your head, neck, heart, or legs? No   Do you have chest pain with activity? No   Do you have a history of heart failure? No   Do you currently have a cold, bronchitis or symptoms of other infection? No   Do you have a cough, shortness of breath, or wheezing? No   Do you or anyone in your family have previous history of blood clots? No   Do you or does anyone in your family have a serious bleeding problem such as prolonged bleeding following surgeries or cuts? No   Have you ever had problems with anemia or been told to take iron pills? No   Have you had any abnormal blood loss such as black, tarry or bloody stools, or abnormal vaginal bleeding? No   Have you ever had a blood transfusion? No   Are you willing to have a blood transfusion if it is medically needed before, during, or after your surgery? Yes   Have you or any of your relatives ever had problems with anesthesia? No   Do you have sleep apnea, excessive snoring or daytime drowsiness? No   Do you have any artifical heart valves or other implanted medical devices like a pacemaker, defibrillator, or continuous glucose monitor? No   Do you have artificial joints? No   Are you allergic to latex? No     Advance Care Planning    Discussed advance care planning with  patient; however, patient declined at this time.    Preoperative Review of    reviewed - controlled substances reflected in medication list.          Patient Active Problem List    Diagnosis Date Noted    Paraspinal mass 04/25/2022     Priority: Medium    Facet arthropathy, lumbosacral 04/08/2022     Priority: Medium    Lumbosacral radiculopathy 04/08/2022     Priority: Medium    Anxiety      Priority: Medium    Callus of foot 11/08/2015     Priority: Medium    DDD (degenerative disc disease), lumbosacral 11/08/2015     Priority: Medium    ACP (advance care planning) 03/26/2014     Priority: Medium     Advance Care Planning:   ACP Review and Resources Provided:  Reviewed chart for advance care plan.  Mylene Vasquez has no plan or code status on file. Discussed available resources and provided with information. Confirmed code status reflects current choices pending further ACP discussions.  Confirmed/documented designated decision maker(s). See permanent comments section of demographics in clinical tab.   Added by Vibha Mcdowell on 3/26/2014        Diagnosis updated by automated process. Provider to review and confirm.      Generalized anxiety disorder 03/26/2014     Priority: Medium     Diagnosis updated by automated process. Provider to review and confirm.        Past Medical History:   Diagnosis Date    Anxiety     Arthritis 7/2012    Chronic midline low back pain with right-sided sciatica     Depressive disorder     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     Past Surgical History:   Procedure Laterality Date    COLONOSCOPY N/A 11/26/2018    rpt 10 years ;  Surgeon: Ilsa Aguirre MD;  Location: [facility]    DESTRUCTION OF PARAVERTEBRAL FACET LUMBAR / SACRAL SINGLE Bilateral 1/11/2023    Procedure: radiofrequency ablations / neurotomies of the bilateral L4-L5 and L5-S1 facet joints;  Surgeon: Laurent Nielson MD;  Location: [facility] OR    INJECT EPIDURAL TRANSFORAMINAL LUMBAR / SACRAL SINGLE Left 9/28/2022    Procedure:  "Left L5-S1 and S1-S2 transforaminal epidural corticosteroid injections.;  Surgeon: Laurent Nielson MD;  Location: [facility] OR    IR SPINAL ANGIOGRAM  09/09/2022    URETHROPLASTY  1968     Current Outpatient Medications   Medication Sig Dispense Refill    clomiPRAMINE (ANAFRANIL) 50 MG capsule every evening Take three tablets at bedtime      gabapentin (GRALISE) 300 MG 24 hr tablet Take 300 mg by mouth daily (with dinner). One at night , / psy      PARoxetine (PAXIL) 30 MG tablet Take by mouth every evening      VITAMIN D PO Take by mouth every 7 days      gabapentin (NEURONTIN) 300 MG capsule Take 1 capsule (300 mg) by mouth At Bedtime for 180 days 180 capsule 0       Allergies   Allergen Reactions    Bactrim [Sulfamethoxazole-Trimethoprim] Nausea and Vomiting and Diarrhea    Pcn [Penicillins]      itchy        Social History     Tobacco Use    Smoking status: Never    Smokeless tobacco: Never   Substance Use Topics    Alcohol use: Not Currently     Comment: very little       History   Drug Use Unknown             Review of Systems  CONSTITUTIONAL: NEGATIVE for fever, chills  RESP: NEGATIVE for significant cough or SOB  CV: NEGATIVE for chest pain, palpitations or peripheral edema      Objective    /82   Pulse 101   Temp 97  F (36.1  C) (Tympanic)   Resp 16   Ht 1.549 m (5' 1\")   Wt 69.4 kg (153 lb)   LMP 10/08/2006   SpO2 97%   BMI 28.91 kg/m     Estimated body mass index is 28.91 kg/m  as calculated from the following:    Height as of this encounter: 1.549 m (5' 1\").    Weight as of this encounter: 69.4 kg (153 lb).      Physical Exam  Constitutional:       General: She is not in acute distress.     Appearance: Normal appearance. She is not ill-appearing, toxic-appearing or diaphoretic.   HENT:      Head: Normocephalic and atraumatic.   Eyes:      Conjunctiva/sclera: Conjunctivae normal.   Cardiovascular:      Rate and Rhythm: Normal rate and regular rhythm.      Heart sounds: Normal heart sounds. " "  Pulmonary:      Effort: Pulmonary effort is normal.      Breath sounds: Normal breath sounds.   Skin:     General: Skin is warm and dry.   Neurological:      Mental Status: She is alert and oriented to person, place, and time.   Psychiatric:         Mood and Affect: Mood normal.         Behavior: Behavior normal.         Thought Content: Thought content normal.         Judgment: Judgment normal.             No results for input(s): \"HGB\", \"PLT\", \"INR\", \"NA\", \"POTASSIUM\", \"CR\", \"A1C\" in the last 8760 hours.     Diagnostics  Labs pending at this time.  Results will be reviewed when available.   No EKG required, no history of coronary heart disease, significant arrhythmia, peripheral arterial disease or other structural heart disease.    Revised Cardiac Risk Index (RCRI)  The patient has the following serious cardiovascular risks for perioperative complications:   - No serious cardiac risks = 0 points     RCRI Interpretation: 0 points: Class I (very low risk - 0.4% complication rate)         Signed Electronically by: FRANKLIN Allison CNP  A copy of this evaluation report is provided to the requesting physician.        "

## 2025-04-30 LAB
ANION GAP SERPL CALCULATED.3IONS-SCNC: 9 MMOL/L (ref 7–15)
BUN SERPL-MCNC: 13.7 MG/DL (ref 8–23)
CALCIUM SERPL-MCNC: 9.2 MG/DL (ref 8.8–10.4)
CHLORIDE SERPL-SCNC: 95 MMOL/L (ref 98–107)
CREAT SERPL-MCNC: 0.64 MG/DL (ref 0.51–0.95)
EGFRCR SERPLBLD CKD-EPI 2021: >90 ML/MIN/1.73M2
GLUCOSE SERPL-MCNC: 109 MG/DL (ref 70–99)
HCO3 SERPL-SCNC: 27 MMOL/L (ref 22–29)
POTASSIUM SERPL-SCNC: 4.1 MMOL/L (ref 3.4–5.3)
SODIUM SERPL-SCNC: 131 MMOL/L (ref 135–145)

## 2025-07-26 ENCOUNTER — HEALTH MAINTENANCE LETTER (OUTPATIENT)
Age: 70
End: 2025-07-26

## (undated) DEVICE — KIT ENDO TURNOVER/PROCEDURE W/CLEAN A SCOPE LINERS 103888

## (undated) DEVICE — TRAY PAIN INJECTION 97A 640

## (undated) DEVICE — PREP CHLORAPREP W/ORANGE TINT 10.5ML 260715

## (undated) DEVICE — NDL SPINAL 22GA 5" QUINCKE 405148

## (undated) DEVICE — GLOVE PROTEXIS POWDER FREE SMT 7.0  2D72PT70X

## (undated) DEVICE — CANNULA MONOPOLAR CVD 100ML 20GA 0406-630-125

## (undated) DEVICE — DRSG PRIMAPORE 03 1/8X6" 66000318

## (undated) DEVICE — ESU CANNULA RF MONOPLOAR CVD 20GA 10X150MM 0406-630-225

## (undated) DEVICE — CANNULA MONOPOLAR CVD 50ML 20GA 0406-630-015

## (undated) DEVICE — GLOVE PROTEXIS POWDER FREE SMT 7.5  2D72PT75X

## (undated) DEVICE — NDL SPINAL 22GA 3.5" QUINCKE 405181

## (undated) RX ORDER — ONDANSETRON 2 MG/ML
INJECTION INTRAMUSCULAR; INTRAVENOUS
Status: DISPENSED
Start: 2022-09-09

## (undated) RX ORDER — HYDROMORPHONE HCL IN WATER/PF 6 MG/30 ML
PATIENT CONTROLLED ANALGESIA SYRINGE INTRAVENOUS
Status: DISPENSED
Start: 2022-09-09

## (undated) RX ORDER — CEFAZOLIN SODIUM/WATER 2 G/20 ML
SYRINGE (ML) INTRAVENOUS
Status: DISPENSED
Start: 2022-09-09

## (undated) RX ORDER — ACETAMINOPHEN 325 MG/1
TABLET ORAL
Status: DISPENSED
Start: 2022-09-09

## (undated) RX ORDER — PROTAMINE SULFATE 10 MG/ML
INJECTION, SOLUTION INTRAVENOUS
Status: DISPENSED
Start: 2022-09-09

## (undated) RX ORDER — GABAPENTIN 300 MG/1
CAPSULE ORAL
Status: DISPENSED
Start: 2022-09-09

## (undated) RX ORDER — PROPOFOL 10 MG/ML
INJECTION, EMULSION INTRAVENOUS
Status: DISPENSED
Start: 2022-09-09

## (undated) RX ORDER — FENTANYL CITRATE 50 UG/ML
INJECTION, SOLUTION INTRAMUSCULAR; INTRAVENOUS
Status: DISPENSED
Start: 2018-11-26

## (undated) RX ORDER — CEFAZOLIN SODIUM 1 G/3ML
INJECTION, POWDER, FOR SOLUTION INTRAMUSCULAR; INTRAVENOUS
Status: DISPENSED
Start: 2022-09-09

## (undated) RX ORDER — GLYCOPYRROLATE 0.2 MG/ML
INJECTION, SOLUTION INTRAMUSCULAR; INTRAVENOUS
Status: DISPENSED
Start: 2022-09-09

## (undated) RX ORDER — SODIUM CHLORIDE 9 MG/ML
INJECTION, SOLUTION INTRAVENOUS
Status: DISPENSED
Start: 2022-09-09

## (undated) RX ORDER — FENTANYL CITRATE 50 UG/ML
INJECTION, SOLUTION INTRAMUSCULAR; INTRAVENOUS
Status: DISPENSED
Start: 2022-09-09

## (undated) RX ORDER — LIDOCAINE HYDROCHLORIDE 20 MG/ML
INJECTION, SOLUTION EPIDURAL; INFILTRATION; INTRACAUDAL; PERINEURAL
Status: DISPENSED
Start: 2022-09-09

## (undated) RX ORDER — HEPARIN SODIUM 1000 [USP'U]/ML
INJECTION, SOLUTION INTRAVENOUS; SUBCUTANEOUS
Status: DISPENSED
Start: 2022-09-09

## (undated) RX ORDER — DEXAMETHASONE SODIUM PHOSPHATE 4 MG/ML
INJECTION, SOLUTION INTRA-ARTICULAR; INTRALESIONAL; INTRAMUSCULAR; INTRAVENOUS; SOFT TISSUE
Status: DISPENSED
Start: 2022-09-09

## (undated) RX ORDER — LIDOCAINE HYDROCHLORIDE 10 MG/ML
INJECTION, SOLUTION EPIDURAL; INFILTRATION; INTRACAUDAL; PERINEURAL
Status: DISPENSED
Start: 2022-09-09